# Patient Record
Sex: MALE | Race: WHITE | NOT HISPANIC OR LATINO | Employment: OTHER | URBAN - METROPOLITAN AREA
[De-identification: names, ages, dates, MRNs, and addresses within clinical notes are randomized per-mention and may not be internally consistent; named-entity substitution may affect disease eponyms.]

---

## 2017-03-08 ENCOUNTER — TRANSFERRED RECORDS (OUTPATIENT)
Dept: HEALTH INFORMATION MANAGEMENT | Facility: CLINIC | Age: 67
End: 2017-03-08

## 2017-05-25 ENCOUNTER — ANESTHESIA EVENT (OUTPATIENT)
Dept: SURGERY | Facility: CLINIC | Age: 67
DRG: 470 | End: 2017-05-25
Payer: MEDICARE

## 2017-05-31 ENCOUNTER — ANESTHESIA (OUTPATIENT)
Dept: SURGERY | Facility: CLINIC | Age: 67
DRG: 470 | End: 2017-05-31
Payer: MEDICARE

## 2017-05-31 ENCOUNTER — APPOINTMENT (OUTPATIENT)
Dept: GENERAL RADIOLOGY | Facility: CLINIC | Age: 67
DRG: 470 | End: 2017-05-31
Attending: ORTHOPAEDIC SURGERY
Payer: MEDICARE

## 2017-05-31 ENCOUNTER — HOSPITAL ENCOUNTER (INPATIENT)
Facility: CLINIC | Age: 67
LOS: 5 days | Discharge: HOME HEALTH | DRG: 470 | End: 2017-06-05
Attending: ORTHOPAEDIC SURGERY | Admitting: ORTHOPAEDIC SURGERY
Payer: MEDICARE

## 2017-05-31 DIAGNOSIS — Z96.611 STATUS POST TOTAL SHOULDER ARTHROPLASTY, RIGHT: Chronic | ICD-10-CM

## 2017-05-31 DIAGNOSIS — Z96.652 STATUS POST TOTAL LEFT KNEE REPLACEMENT: Primary | ICD-10-CM

## 2017-05-31 PROBLEM — I25.10 CAD (CORONARY ARTERY DISEASE): Status: ACTIVE | Noted: 2017-05-31

## 2017-05-31 PROBLEM — M17.12 LEFT KNEE DJD: Status: RESOLVED | Noted: 2017-05-31 | Resolved: 2017-05-31

## 2017-05-31 PROBLEM — M17.12 LEFT KNEE DJD: Status: ACTIVE | Noted: 2017-05-31

## 2017-05-31 LAB
BASOPHILS # BLD AUTO: 0 10E9/L (ref 0–0.2)
BASOPHILS NFR BLD AUTO: 0.4 %
CREAT SERPL-MCNC: 1.44 MG/DL (ref 0.66–1.25)
DIFFERENTIAL METHOD BLD: NORMAL
EOSINOPHIL # BLD AUTO: 0.1 10E9/L (ref 0–0.7)
EOSINOPHIL NFR BLD AUTO: 1.9 %
ERYTHROCYTE [DISTWIDTH] IN BLOOD BY AUTOMATED COUNT: 13.2 % (ref 10–15)
GFR SERPL CREATININE-BSD FRML MDRD: 49 ML/MIN/1.7M2
HCT VFR BLD AUTO: 45.8 % (ref 40–53)
HGB BLD-MCNC: 15.3 G/DL (ref 13.3–17.7)
IMM GRANULOCYTES # BLD: 0 10E9/L (ref 0–0.4)
IMM GRANULOCYTES NFR BLD: 0.4 %
INR PPP: 1.04 (ref 0.86–1.14)
LYMPHOCYTES # BLD AUTO: 1.5 10E9/L (ref 0.8–5.3)
LYMPHOCYTES NFR BLD AUTO: 19.8 %
MCH RBC QN AUTO: 30.1 PG (ref 26.5–33)
MCHC RBC AUTO-ENTMCNC: 33.4 G/DL (ref 31.5–36.5)
MCV RBC AUTO: 90 FL (ref 78–100)
MONOCYTES # BLD AUTO: 0.7 10E9/L (ref 0–1.3)
MONOCYTES NFR BLD AUTO: 8.9 %
NEUTROPHILS # BLD AUTO: 5 10E9/L (ref 1.6–8.3)
NEUTROPHILS NFR BLD AUTO: 68.6 %
PLATELET # BLD AUTO: 249 10E9/L (ref 150–450)
RBC # BLD AUTO: 5.08 10E12/L (ref 4.4–5.9)
WBC # BLD AUTO: 7.3 10E9/L (ref 4–11)

## 2017-05-31 PROCEDURE — 25000128 H RX IP 250 OP 636: Performed by: NURSE ANESTHETIST, CERTIFIED REGISTERED

## 2017-05-31 PROCEDURE — 27210794 ZZH OR GENERAL SUPPLY STERILE: Performed by: ORTHOPAEDIC SURGERY

## 2017-05-31 PROCEDURE — 40000274 ZZH STATISTIC RCP CONSULT EA 30 MIN

## 2017-05-31 PROCEDURE — 36415 COLL VENOUS BLD VENIPUNCTURE: CPT | Performed by: ORTHOPAEDIC SURGERY

## 2017-05-31 PROCEDURE — 37000008 ZZH ANESTHESIA TECHNICAL FEE, 1ST 30 MIN: Performed by: ORTHOPAEDIC SURGERY

## 2017-05-31 PROCEDURE — 37000009 ZZH ANESTHESIA TECHNICAL FEE, EACH ADDTL 15 MIN: Performed by: ORTHOPAEDIC SURGERY

## 2017-05-31 PROCEDURE — C1776 JOINT DEVICE (IMPLANTABLE): HCPCS | Performed by: ORTHOPAEDIC SURGERY

## 2017-05-31 PROCEDURE — 25000125 ZZHC RX 250: Performed by: PHYSICIAN ASSISTANT

## 2017-05-31 PROCEDURE — 85025 COMPLETE CBC W/AUTO DIFF WBC: CPT | Performed by: PHYSICIAN ASSISTANT

## 2017-05-31 PROCEDURE — 25000132 ZZH RX MED GY IP 250 OP 250 PS 637: Mod: GY | Performed by: ORTHOPAEDIC SURGERY

## 2017-05-31 PROCEDURE — 82565 ASSAY OF CREATININE: CPT | Performed by: ORTHOPAEDIC SURGERY

## 2017-05-31 PROCEDURE — 25000128 H RX IP 250 OP 636: Performed by: ORTHOPAEDIC SURGERY

## 2017-05-31 PROCEDURE — 71000012 ZZH RECOVERY PHASE 1 LEVEL 1 FIRST HR: Performed by: ORTHOPAEDIC SURGERY

## 2017-05-31 PROCEDURE — A9270 NON-COVERED ITEM OR SERVICE: HCPCS | Mod: GY | Performed by: ORTHOPAEDIC SURGERY

## 2017-05-31 PROCEDURE — 40000306 ZZH STATISTIC PRE PROC ASSESS II: Performed by: ORTHOPAEDIC SURGERY

## 2017-05-31 PROCEDURE — 71000013 ZZH RECOVERY PHASE 1 LEVEL 1 EA ADDTL HR: Performed by: ORTHOPAEDIC SURGERY

## 2017-05-31 PROCEDURE — 25000125 ZZHC RX 250: Performed by: NURSE ANESTHETIST, CERTIFIED REGISTERED

## 2017-05-31 PROCEDURE — 27810169 ZZH OR IMPLANT GENERAL: Performed by: ORTHOPAEDIC SURGERY

## 2017-05-31 PROCEDURE — 40000986 XR KNEE PORT LT 1/2 VW: Mod: LT

## 2017-05-31 PROCEDURE — 27110028 ZZH OR GENERAL SUPPLY NON-STERILE: Performed by: ORTHOPAEDIC SURGERY

## 2017-05-31 PROCEDURE — 36415 COLL VENOUS BLD VENIPUNCTURE: CPT | Performed by: PHYSICIAN ASSISTANT

## 2017-05-31 PROCEDURE — 25000128 H RX IP 250 OP 636: Performed by: PHYSICIAN ASSISTANT

## 2017-05-31 PROCEDURE — 85610 PROTHROMBIN TIME: CPT | Performed by: PHYSICIAN ASSISTANT

## 2017-05-31 PROCEDURE — 36000093 ZZH SURGERY LEVEL 4 1ST 30 MIN: Performed by: ORTHOPAEDIC SURGERY

## 2017-05-31 PROCEDURE — 12000007 ZZH R&B INTERMEDIATE

## 2017-05-31 PROCEDURE — 36000063 ZZH SURGERY LEVEL 4 EA 15 ADDTL MIN: Performed by: ORTHOPAEDIC SURGERY

## 2017-05-31 PROCEDURE — 0SRD0J9 REPLACEMENT OF LEFT KNEE JOINT WITH SYNTHETIC SUBSTITUTE, CEMENTED, OPEN APPROACH: ICD-10-PCS | Performed by: ORTHOPAEDIC SURGERY

## 2017-05-31 DEVICE — IMP BASEPLATE TIBIAL HOWM TRI 7 5520-B-700: Type: IMPLANTABLE DEVICE | Site: KNEE | Status: FUNCTIONAL

## 2017-05-31 DEVICE — IMP COMP PATELLA HOWM TRI ASYM 38X11MM 5551-G-381: Type: IMPLANTABLE DEVICE | Site: KNEE | Status: FUNCTIONAL

## 2017-05-31 DEVICE — BONE CEMENT PALACOS 00-1112-140-01: Type: IMPLANTABLE DEVICE | Site: KNEE | Status: FUNCTIONAL

## 2017-05-31 DEVICE — IMP COMP FEM STRK TRIATHLN PS LT 7 5515-F-701: Type: IMPLANTABLE DEVICE | Site: KNEE | Status: FUNCTIONAL

## 2017-05-31 DEVICE — IMP INSERT TIBIAL HOWM TRI 7 11MM 5532-G-711: Type: IMPLANTABLE DEVICE | Site: KNEE | Status: FUNCTIONAL

## 2017-05-31 RX ORDER — MEPERIDINE HYDROCHLORIDE 25 MG/ML
12.5 INJECTION INTRAMUSCULAR; INTRAVENOUS; SUBCUTANEOUS EVERY 5 MIN PRN
Status: DISCONTINUED | OUTPATIENT
Start: 2017-05-31 | End: 2017-05-31 | Stop reason: HOSPADM

## 2017-05-31 RX ORDER — ALBUTEROL SULFATE 0.83 MG/ML
2.5 SOLUTION RESPIRATORY (INHALATION) EVERY 4 HOURS PRN
Status: DISCONTINUED | OUTPATIENT
Start: 2017-05-31 | End: 2017-05-31 | Stop reason: HOSPADM

## 2017-05-31 RX ORDER — ONDANSETRON 2 MG/ML
4 INJECTION INTRAMUSCULAR; INTRAVENOUS EVERY 30 MIN PRN
Status: DISCONTINUED | OUTPATIENT
Start: 2017-05-31 | End: 2017-05-31 | Stop reason: HOSPADM

## 2017-05-31 RX ORDER — ACETAMINOPHEN 325 MG/1
975 TABLET ORAL EVERY 8 HOURS
Status: COMPLETED | OUTPATIENT
Start: 2017-05-31 | End: 2017-06-03

## 2017-05-31 RX ORDER — LIDOCAINE HYDROCHLORIDE 10 MG/ML
INJECTION, SOLUTION INFILTRATION; PERINEURAL PRN
Status: DISCONTINUED | OUTPATIENT
Start: 2017-05-31 | End: 2017-05-31

## 2017-05-31 RX ORDER — ALBUTEROL SULFATE 0.83 MG/ML
2.5 SOLUTION RESPIRATORY (INHALATION) EVERY 4 HOURS PRN
Status: DISCONTINUED | OUTPATIENT
Start: 2017-05-31 | End: 2017-06-05 | Stop reason: HOSPADM

## 2017-05-31 RX ORDER — DEXAMETHASONE SODIUM PHOSPHATE 4 MG/ML
4 INJECTION, SOLUTION INTRA-ARTICULAR; INTRALESIONAL; INTRAMUSCULAR; INTRAVENOUS; SOFT TISSUE
Status: DISCONTINUED | OUTPATIENT
Start: 2017-05-31 | End: 2017-05-31 | Stop reason: HOSPADM

## 2017-05-31 RX ORDER — LISINOPRIL/HYDROCHLOROTHIAZIDE 10-12.5 MG
2 TABLET ORAL 2 TIMES DAILY
Status: DISCONTINUED | OUTPATIENT
Start: 2017-06-01 | End: 2017-06-01

## 2017-05-31 RX ORDER — GLYCOPYRROLATE 0.2 MG/ML
INJECTION, SOLUTION INTRAMUSCULAR; INTRAVENOUS PRN
Status: DISCONTINUED | OUTPATIENT
Start: 2017-05-31 | End: 2017-05-31

## 2017-05-31 RX ORDER — ALBUTEROL SULFATE 90 UG/1
2 AEROSOL, METERED RESPIRATORY (INHALATION) EVERY 4 HOURS PRN
Status: DISCONTINUED | OUTPATIENT
Start: 2017-05-31 | End: 2017-06-05 | Stop reason: HOSPADM

## 2017-05-31 RX ORDER — PROCHLORPERAZINE MALEATE 5 MG
5 TABLET ORAL EVERY 6 HOURS PRN
Status: DISCONTINUED | OUTPATIENT
Start: 2017-05-31 | End: 2017-06-05 | Stop reason: HOSPADM

## 2017-05-31 RX ORDER — ONDANSETRON 2 MG/ML
INJECTION INTRAMUSCULAR; INTRAVENOUS PRN
Status: DISCONTINUED | OUTPATIENT
Start: 2017-05-31 | End: 2017-05-31

## 2017-05-31 RX ORDER — ALBUTEROL SULFATE 0.83 MG/ML
2.5 SOLUTION RESPIRATORY (INHALATION) EVERY 4 HOURS PRN
COMMUNITY
Start: 2017-05-23

## 2017-05-31 RX ORDER — NALOXONE HYDROCHLORIDE 0.4 MG/ML
.1-.4 INJECTION, SOLUTION INTRAMUSCULAR; INTRAVENOUS; SUBCUTANEOUS
Status: DISCONTINUED | OUTPATIENT
Start: 2017-05-31 | End: 2017-05-31

## 2017-05-31 RX ORDER — HYDROMORPHONE HYDROCHLORIDE 1 MG/ML
.3-.5 INJECTION, SOLUTION INTRAMUSCULAR; INTRAVENOUS; SUBCUTANEOUS EVERY 5 MIN PRN
Status: DISCONTINUED | OUTPATIENT
Start: 2017-05-31 | End: 2017-05-31 | Stop reason: HOSPADM

## 2017-05-31 RX ORDER — LIDOCAINE 40 MG/G
CREAM TOPICAL
Status: DISCONTINUED | OUTPATIENT
Start: 2017-05-31 | End: 2017-06-05 | Stop reason: HOSPADM

## 2017-05-31 RX ORDER — HYDROXYZINE HYDROCHLORIDE 10 MG/1
10 TABLET, FILM COATED ORAL EVERY 6 HOURS PRN
Status: DISCONTINUED | OUTPATIENT
Start: 2017-05-31 | End: 2017-06-05 | Stop reason: HOSPADM

## 2017-05-31 RX ORDER — ALBUTEROL SULFATE 90 UG/1
2 AEROSOL, METERED RESPIRATORY (INHALATION) EVERY 4 HOURS PRN
Status: CANCELLED | OUTPATIENT
Start: 2017-05-31

## 2017-05-31 RX ORDER — BUPIVACAINE HYDROCHLORIDE 7.5 MG/ML
INJECTION, SOLUTION INTRASPINAL PRN
Status: DISCONTINUED | OUTPATIENT
Start: 2017-05-31 | End: 2017-05-31

## 2017-05-31 RX ORDER — FENTANYL CITRATE 50 UG/ML
INJECTION, SOLUTION INTRAMUSCULAR; INTRAVENOUS PRN
Status: DISCONTINUED | OUTPATIENT
Start: 2017-05-31 | End: 2017-05-31

## 2017-05-31 RX ORDER — ONDANSETRON 2 MG/ML
4 INJECTION INTRAMUSCULAR; INTRAVENOUS EVERY 6 HOURS PRN
Status: DISCONTINUED | OUTPATIENT
Start: 2017-05-31 | End: 2017-06-05 | Stop reason: HOSPADM

## 2017-05-31 RX ORDER — CEFAZOLIN SODIUM 1 G/3ML
1 INJECTION, POWDER, FOR SOLUTION INTRAMUSCULAR; INTRAVENOUS SEE ADMIN INSTRUCTIONS
Status: DISCONTINUED | OUTPATIENT
Start: 2017-05-31 | End: 2017-05-31 | Stop reason: HOSPADM

## 2017-05-31 RX ORDER — SPIRONOLACTONE 25 MG/1
25 TABLET ORAL DAILY
Status: DISCONTINUED | OUTPATIENT
Start: 2017-06-01 | End: 2017-06-01

## 2017-05-31 RX ORDER — PROPOFOL 10 MG/ML
INJECTION, EMULSION INTRAVENOUS CONTINUOUS PRN
Status: DISCONTINUED | OUTPATIENT
Start: 2017-05-31 | End: 2017-05-31

## 2017-05-31 RX ORDER — OXYCODONE HYDROCHLORIDE 5 MG/1
5-10 TABLET ORAL EVERY 4 HOURS PRN
Status: DISCONTINUED | OUTPATIENT
Start: 2017-05-31 | End: 2017-06-05

## 2017-05-31 RX ORDER — ACETAMINOPHEN 325 MG/1
650 TABLET ORAL EVERY 4 HOURS PRN
Status: DISCONTINUED | OUTPATIENT
Start: 2017-06-03 | End: 2017-06-05 | Stop reason: HOSPADM

## 2017-05-31 RX ORDER — NALOXONE HYDROCHLORIDE 0.4 MG/ML
.1-.4 INJECTION, SOLUTION INTRAMUSCULAR; INTRAVENOUS; SUBCUTANEOUS
Status: DISCONTINUED | OUTPATIENT
Start: 2017-05-31 | End: 2017-06-05 | Stop reason: HOSPADM

## 2017-05-31 RX ORDER — HYDROMORPHONE HYDROCHLORIDE 1 MG/ML
.3-.5 INJECTION, SOLUTION INTRAMUSCULAR; INTRAVENOUS; SUBCUTANEOUS
Status: DISCONTINUED | OUTPATIENT
Start: 2017-05-31 | End: 2017-06-05 | Stop reason: HOSPADM

## 2017-05-31 RX ORDER — SODIUM CHLORIDE 9 MG/ML
INJECTION, SOLUTION INTRAVENOUS CONTINUOUS
Status: DISCONTINUED | OUTPATIENT
Start: 2017-05-31 | End: 2017-06-01

## 2017-05-31 RX ORDER — AMOXICILLIN 250 MG
1-2 CAPSULE ORAL 2 TIMES DAILY
Status: DISCONTINUED | OUTPATIENT
Start: 2017-05-31 | End: 2017-06-05 | Stop reason: HOSPADM

## 2017-05-31 RX ORDER — DEXAMETHASONE SODIUM PHOSPHATE 4 MG/ML
INJECTION, SOLUTION INTRA-ARTICULAR; INTRALESIONAL; INTRAMUSCULAR; INTRAVENOUS; SOFT TISSUE PRN
Status: DISCONTINUED | OUTPATIENT
Start: 2017-05-31 | End: 2017-05-31

## 2017-05-31 RX ORDER — CEFAZOLIN SODIUM 2 G/100ML
2 INJECTION, SOLUTION INTRAVENOUS EVERY 8 HOURS
Status: COMPLETED | OUTPATIENT
Start: 2017-05-31 | End: 2017-06-01

## 2017-05-31 RX ORDER — FENTANYL CITRATE 50 UG/ML
25-50 INJECTION, SOLUTION INTRAMUSCULAR; INTRAVENOUS
Status: DISCONTINUED | OUTPATIENT
Start: 2017-05-31 | End: 2017-05-31 | Stop reason: HOSPADM

## 2017-05-31 RX ORDER — ONDANSETRON 4 MG/1
4 TABLET, ORALLY DISINTEGRATING ORAL EVERY 6 HOURS PRN
Status: DISCONTINUED | OUTPATIENT
Start: 2017-05-31 | End: 2017-06-05 | Stop reason: HOSPADM

## 2017-05-31 RX ORDER — SODIUM CHLORIDE, SODIUM LACTATE, POTASSIUM CHLORIDE, CALCIUM CHLORIDE 600; 310; 30; 20 MG/100ML; MG/100ML; MG/100ML; MG/100ML
INJECTION, SOLUTION INTRAVENOUS CONTINUOUS
Status: DISCONTINUED | OUTPATIENT
Start: 2017-05-31 | End: 2017-05-31 | Stop reason: HOSPADM

## 2017-05-31 RX ORDER — ONDANSETRON 4 MG/1
4 TABLET, ORALLY DISINTEGRATING ORAL EVERY 30 MIN PRN
Status: DISCONTINUED | OUTPATIENT
Start: 2017-05-31 | End: 2017-05-31 | Stop reason: HOSPADM

## 2017-05-31 RX ORDER — LIDOCAINE 40 MG/G
CREAM TOPICAL
Status: DISCONTINUED | OUTPATIENT
Start: 2017-05-31 | End: 2017-05-31 | Stop reason: HOSPADM

## 2017-05-31 RX ORDER — CEFAZOLIN SODIUM 1 G/50ML
3 SOLUTION INTRAVENOUS
Status: COMPLETED | OUTPATIENT
Start: 2017-05-31 | End: 2017-05-31

## 2017-05-31 RX ADMIN — PROPOFOL 50 MCG/KG/MIN: 10 INJECTION, EMULSION INTRAVENOUS at 12:31

## 2017-05-31 RX ADMIN — GLYCOPYRROLATE 0.1 MG: 0.2 INJECTION, SOLUTION INTRAMUSCULAR; INTRAVENOUS at 12:20

## 2017-05-31 RX ADMIN — PHENYLEPHRINE HYDROCHLORIDE 100 MCG: 10 INJECTION, SOLUTION INTRAMUSCULAR; INTRAVENOUS; SUBCUTANEOUS at 13:18

## 2017-05-31 RX ADMIN — Medication 3 G: at 12:20

## 2017-05-31 RX ADMIN — PHENYLEPHRINE HYDROCHLORIDE 100 MCG: 10 INJECTION, SOLUTION INTRAMUSCULAR; INTRAVENOUS; SUBCUTANEOUS at 13:43

## 2017-05-31 RX ADMIN — PHENYLEPHRINE HYDROCHLORIDE 100 MCG: 10 INJECTION, SOLUTION INTRAMUSCULAR; INTRAVENOUS; SUBCUTANEOUS at 13:14

## 2017-05-31 RX ADMIN — SODIUM CHLORIDE: 9 INJECTION, SOLUTION INTRAVENOUS at 16:58

## 2017-05-31 RX ADMIN — SODIUM CHLORIDE, POTASSIUM CHLORIDE, SODIUM LACTATE AND CALCIUM CHLORIDE: 600; 310; 30; 20 INJECTION, SOLUTION INTRAVENOUS at 10:55

## 2017-05-31 RX ADMIN — MIDAZOLAM HYDROCHLORIDE 1 MG: 1 INJECTION, SOLUTION INTRAMUSCULAR; INTRAVENOUS at 12:31

## 2017-05-31 RX ADMIN — PHENYLEPHRINE HYDROCHLORIDE 100 MCG: 10 INJECTION, SOLUTION INTRAMUSCULAR; INTRAVENOUS; SUBCUTANEOUS at 12:52

## 2017-05-31 RX ADMIN — MIDAZOLAM HYDROCHLORIDE 2 MG: 1 INJECTION, SOLUTION INTRAMUSCULAR; INTRAVENOUS at 12:20

## 2017-05-31 RX ADMIN — OXYCODONE HYDROCHLORIDE 10 MG: 5 TABLET ORAL at 20:45

## 2017-05-31 RX ADMIN — MIDAZOLAM HYDROCHLORIDE 1 MG: 1 INJECTION, SOLUTION INTRAMUSCULAR; INTRAVENOUS at 13:23

## 2017-05-31 RX ADMIN — FENTANYL CITRATE 50 MCG: 50 INJECTION, SOLUTION INTRAMUSCULAR; INTRAVENOUS at 12:22

## 2017-05-31 RX ADMIN — CEFAZOLIN SODIUM 2 G: 2 INJECTION, SOLUTION INTRAVENOUS at 20:04

## 2017-05-31 RX ADMIN — FENTANYL CITRATE 50 MCG: 50 INJECTION, SOLUTION INTRAMUSCULAR; INTRAVENOUS at 12:20

## 2017-05-31 RX ADMIN — TRANEXAMIC ACID 1 G: 100 INJECTION, SOLUTION INTRAVENOUS at 12:36

## 2017-05-31 RX ADMIN — PHENYLEPHRINE HYDROCHLORIDE 100 MCG: 10 INJECTION, SOLUTION INTRAMUSCULAR; INTRAVENOUS; SUBCUTANEOUS at 13:34

## 2017-05-31 RX ADMIN — SENNOSIDES AND DOCUSATE SODIUM 2 TABLET: 8.6; 5 TABLET ORAL at 20:05

## 2017-05-31 RX ADMIN — SODIUM CHLORIDE, POTASSIUM CHLORIDE, SODIUM LACTATE AND CALCIUM CHLORIDE: 600; 310; 30; 20 INJECTION, SOLUTION INTRAVENOUS at 13:33

## 2017-05-31 RX ADMIN — PHENYLEPHRINE HYDROCHLORIDE 100 MCG: 10 INJECTION, SOLUTION INTRAMUSCULAR; INTRAVENOUS; SUBCUTANEOUS at 12:45

## 2017-05-31 RX ADMIN — PHENYLEPHRINE HYDROCHLORIDE 100 MCG: 10 INJECTION, SOLUTION INTRAMUSCULAR; INTRAVENOUS; SUBCUTANEOUS at 13:21

## 2017-05-31 RX ADMIN — DEXAMETHASONE SODIUM PHOSPHATE 8 MG: 4 INJECTION, SOLUTION INTRA-ARTICULAR; INTRALESIONAL; INTRAMUSCULAR; INTRAVENOUS; SOFT TISSUE at 12:34

## 2017-05-31 RX ADMIN — LIDOCAINE HYDROCHLORIDE 1 ML: 10 INJECTION, SOLUTION INFILTRATION; PERINEURAL at 12:27

## 2017-05-31 RX ADMIN — PHENYLEPHRINE HYDROCHLORIDE 100 MCG: 10 INJECTION, SOLUTION INTRAMUSCULAR; INTRAVENOUS; SUBCUTANEOUS at 13:05

## 2017-05-31 RX ADMIN — RANITIDINE HYDROCHLORIDE 150 MG: 150 TABLET, FILM COATED ORAL at 20:05

## 2017-05-31 RX ADMIN — HYDROXYZINE HYDROCHLORIDE 10 MG: 10 TABLET ORAL at 20:45

## 2017-05-31 RX ADMIN — OXYCODONE HYDROCHLORIDE 10 MG: 5 TABLET ORAL at 16:53

## 2017-05-31 RX ADMIN — BUPIVACAINE HYDROCHLORIDE IN DEXTROSE 1.6 ML: 7.5 INJECTION, SOLUTION SUBARACHNOID at 12:28

## 2017-05-31 RX ADMIN — EPINEPHRINE 0.2 MG: 1 INJECTION, SOLUTION INTRAMUSCULAR; SUBCUTANEOUS at 12:28

## 2017-05-31 RX ADMIN — MIDAZOLAM HYDROCHLORIDE 2 MG: 1 INJECTION, SOLUTION INTRAMUSCULAR; INTRAVENOUS at 12:22

## 2017-05-31 RX ADMIN — LIDOCAINE HYDROCHLORIDE 1 ML: 10 INJECTION, SOLUTION INFILTRATION; PERINEURAL at 12:25

## 2017-05-31 RX ADMIN — MIDAZOLAM HYDROCHLORIDE 1 MG: 1 INJECTION, SOLUTION INTRAMUSCULAR; INTRAVENOUS at 12:42

## 2017-05-31 RX ADMIN — LIDOCAINE HYDROCHLORIDE 10 ML: 10 INJECTION, SOLUTION INFILTRATION; PERINEURAL at 12:31

## 2017-05-31 RX ADMIN — ONDANSETRON 4 MG: 2 INJECTION INTRAMUSCULAR; INTRAVENOUS at 12:35

## 2017-05-31 RX ADMIN — PHENYLEPHRINE HYDROCHLORIDE 100 MCG: 10 INJECTION, SOLUTION INTRAMUSCULAR; INTRAVENOUS; SUBCUTANEOUS at 13:46

## 2017-05-31 RX ADMIN — ACETAMINOPHEN 975 MG: 325 TABLET, FILM COATED ORAL at 16:54

## 2017-05-31 RX ADMIN — GLYCOPYRROLATE 0.1 MG: 0.2 INJECTION, SOLUTION INTRAMUSCULAR; INTRAVENOUS at 12:22

## 2017-05-31 ASSESSMENT — LIFESTYLE VARIABLES: TOBACCO_USE: 1

## 2017-05-31 NOTE — IP AVS SNAPSHOT
[facility]    [address] [facility] [address]    Phone:  [phone]    Fax:  [phone]                                       After Visit Summary   5/31/2017    Jorge Alberto Frye    MRN: [id number]           After Visit Summary Signature Page     I have received my discharge instructions, and my questions have been answered. I have discussed any challenges I see with this plan with the nurse or doctor.    ..........................................................................................................................................  Patient/Patient Representative Signature      ..........................................................................................................................................  Patient Representative Print Name and Relationship to Patient    ..................................................               ................................................  Date                                            Time    ..........................................................................................................................................  Reviewed by Signature/Title    ...................................................              ..............................................  Date                                                            Time

## 2017-05-31 NOTE — PROGRESS NOTES
"[facility] [facility] ADMISSION NOTE    Patient admitted to room 2213 at approximately 1600 via cart from surgery. Patient was accompanied by transport tech and nurse.     Verbal SBAR report received from Pat RN prior to patient arrival.     Patient trasferred to bed via air marcelo. Patient alert and oriented X 3. The patient is not having any pain.  . Admission vital signs: Blood pressure 110/62, pulse 72, temperature 97.2  F (36.2  C), temperature source Oral, resp. rate 14, height 1.93 m (6' 4\"), weight 122.5 kg (270 lb), SpO2 94 %. Patient was oriented to plan of care, call light, bed controls, tv, telephone, bathroom and visiting hours.     The following safety risks were identified during admission: fall. Yellow risk band applied: YES.     Francisca Murrell    "

## 2017-05-31 NOTE — ANESTHESIA PROCEDURE NOTES
Peripheral nerve/Neuraxial procedure note : intrathecal  Pre-Procedure  Performed by  ELISABETH GOMEZ   Location: OR    Procedure Times:5/31/2017 12:23 PM and 5/31/2017 12:29 PM  Pre-Anesthestic Checklist: patient identified, IV checked, site marked, risks and benefits discussed, informed consent, monitors and equipment checked, pre-op evaluation and at physician/surgeon's request    Timeout  Correct Patient: Yes   Correct Procedure: Yes   Correct Site: Yes   Correct Laterality: N/A   Correct Position: Yes   Site Marked: N/A   .   Procedure Documentation  ASA 3  Diagnosis:L knee DJD.    Procedure:    Intrathecal.  Insertion Site:L2-3  (midline approach)      Patient Prep;mask, sterile gloves, povidone-iodine 7.5% surgical scrub, patient draped.  .  Needle: (). # of attempts: 2. # of redirects: 1.  Spinal Needle: Herminio tip 25 G. 3.5 in.  Introducer used. . .     Assessment/Narrative  Paresthesias: No.  .  .  clear CSF fluid removed while sitting   . Time Injected: 12:28

## 2017-05-31 NOTE — ANESTHESIA CARE TRANSFER NOTE
Patient: Jorge Alberto Frye    Procedure(s):  Left Total Knee Arthroplasty - Wound Class: I-Clean    Diagnosis: degenerative joint disease  Diagnosis Additional Information: No value filed.    Anesthesia Type:   Spinal, Periph. Nerve Block for postop pain     Note:  Airway :Nasal Cannula  Patient transferred to:PACU        Vitals: (Last set prior to Anesthesia Care Transfer)    CRNA VITALS  5/31/2017 1345 - 5/31/2017 1415      5/31/2017             Pulse: 75    SpO2: 97 %                Electronically Signed By: MAME Calix CRNA  May 31, 2017  2:15 PM

## 2017-05-31 NOTE — IP AVS SNAPSHOT
MRN:[id number]                      After Visit Summary   5/31/2017    Jorge Alberto Frye    MRN: [id number]           Thank you!     Thank you for choosing [facility] for your care. Our goal is always to provide you with excellent care. Hearing back from our patients is one way we can continue to improve our services. Please take a few minutes to complete the written survey that you may receive in the mail after you visit with us. Thank you!        Patient Information     Date Of Birth          1950        Designated Caregiver       Most Recent Value    Caregiver    Will someone help with your care after discharge? yes    Name of designated caregiver genaro    Phone number of caregiver [phone]    Caregiver address same as patient      About your hospital stay     You were admitted on:  May 31, 2017 You last received care in the:  [facility]    You were discharged on:  June 5, 2017        Reason for your hospital stay       Left total knee arthroplasty                  Who to Call     For medical emergencies, please call 911.  For non-urgent questions about your medical care, please call your primary care provider or clinic, [phone]  For questions related to your surgery, please call your surgery clinic        Attending Provider     Provider Jorge Alberto White MD Orthopedics       Primary Care Provider Office Phone # Fax #    Rich Lloyd [phone]       When to contact your care team       Call your Orthopedic surgeon at [facility]  if you have any of the following: temperature greater than 100.4,  increased shortness of breath, increased drainage, increased swelling or increased pain.                  After Care Instructions     Activity       Your activity upon discharge: activity as tolerated, no driving until off narcotic pain medication            Diet       Follow this diet upon discharge: Orders Placed This  Encounter      Regular Diet Adult              Supplies       List the supplies the pt needs to go home            Wound care and dressings       Instructions to care for your wound at home: as directed, daily dressing changes, ice to area for comfort, keep wound clean and dry and may get incision wet in shower but do not soak or scrub.                  Follow-up Appointments     Follow-up and recommended labs and tests       Follow up with  Jay Saleem PA-C , at (location with clinic name or city) [facility], within 2 weeks to evaluate after surgery and for hospital follow- up.                  Additional Services     Home Care Referral       _________________________________________________________________    Your provider has referred you to: [facility]: [facility] [phone]   [email or web address]    Extended Emergency Contact Information  Primary Emergency Contact: LAVONNE MARCANO  Address: [address] [address]  Home Phone: [phone]  Work Phone: [phone]  Mobile Phone: [phone]  Relation: Spouse    Patient Anticipated Discharge Date: 6/3/17   RN, PT, HHA to begin 24 - 48 hours after discharge.  PLEASE EVALUATE AND TREAT (Evaluation timeline is 24 - 48 hrs. Please call if there is need for a variance to this timeline).    REASON FOR REFERRAL: Assessment & Treatment: PT and RN    ADDITIONAL SERVICES NEEDED: None    OTHER PERTINENT INFORMATION: Patient was last seen by provider on 6/2/17 for TKA.    Current Outpatient Prescriptions:  oxyCODONE (ROXICODONE) 5 MG IR tablet, Take 1-2 tablets (5-10 mg) by mouth every 4 hours as needed for moderate to severe pain, Disp: 50 tablet, Rfl: 0  hydrOXYzine (ATARAX) 10 MG tablet, Take 1 tablet (10 mg) by mouth every 6 hours as needed for itching or other (adjunct pain), Disp: 50 tablet, Rfl: 0  senna-docusate (SENOKOT-S;PERICOLACE)  8.6-50 MG per tablet, Take 1-2 tablets by mouth 2 times daily, Disp: 100 tablet, Rfl: 1  order for DME, Equipment being ordered: Quad Cane (), Raised Toilet Seats (), Bath Seat () and Other: long reach grasper and hand held shower attachment  Treatment Diagnosis: s/p L TKA, Disp: 1 Units, Rfl: 0  aspirin  MG EC tablet, Take 1 tablet (325 mg) by mouth daily, Disp: 30 tablet, Rfl: 1      Patient Active Problem List:     Status post total shoulder arthroplasty, right     Elevated prostate specific antigen (PSA)     Acute pancreatitis     Cyst of epididymis     Gastroesophageal reflux disease     Benign essential hypertension     Hyperlipidemia     Impotence of organic origin     Low back pain     Primary osteoarthritis of both knees     Encounter for screening for malignant neoplasm of colon     Stricture of esophagus     Asthma     Status post total left knee replacement     CAD (coronary artery disease)     RITA (acute kidney injury) (H)      Documentation of Face to Face and Certification for Home Health Services    I certify that patient, Jorge Alberto Frye is under my care and that I, or a Nurse Practitioner or Physician's Assistant working with me, had a face-to-face encounter that meets the physician face-to-face encounter requirements with this patient on: 6/2/2017.    This encounter with the patient was in whole, or in part, for the following medical condition, which is the primary reason for Home Health Care: TKA.    I certify that, based on my findings, the following services are medically necessary Home Health Services: Nursing and Physical Therapy    My clinical findings support the need for the above services because: Physical Therapy Services are needed to assess and treat the following functional impairments: TKA.    Further, I certify that my clinical findings support that this patient is homebound (i.e. absences from home require considerable and taxing effort and are for medical  reasons or [employer] services or infrequently or of short duration when for other reasons) because: Requires assistance of another person or specialized equipment to access medical services because patient: Requires supervision of another for safe transfer..    Based on the above findings, I certify that this patient is confined to the home and needs intermittent skilled nursing care, physical therapy and/or speech therapy.  The patient is under my care, and I have initiated the establishment of the plan of care.  This patient will be followed by a physician who will periodically review the plan of care.    Physician/Provider to provide follow up care: Rich Lloyd    Responsible [place] certified Physician at time of discharge: Dr. Jurado    Please be aware that coverage of these services is subject to the terms and limitations of your health insurance plan.  Call member services at your health plan with any benefit or coverage questions.            Physical Therapy Referral       *This therapy referral will be filtered to a centralized scheduling office at [facility] and the patient will receive a call to schedule an appointment at a [place] location most convenient for them. *     [facility] provides Physical Therapy evaluation and treatment and many specialty services across the [place] system.  If requesting a specialty program, please choose from the list below.    If you have not heard from the scheduling office within 2 business days, please call [phone] for all locations, with the exception of [place], please call [phone].  Treatment: Evaluation & Treatment  Special Instructions/Modalities: none  Special Programs: None    Please be aware that coverage of these services is subject to the terms and limitations of your health insurance plan.  Call member services at your health plan with any benefit or coverage questions.      **Note to Provider:  If  "you are referring outside of [place] for the therapy appointment, please list the name of the location in the \"special instructions\" above, print the referral and give to the patient to schedule the appointment.                  Further instructions from your care team       1.  Follow up with Jay Saleem PA-C.  in 2 weeks for post op check and x rays as scheduled.  Call [phone] if appointment needed or questions  2.  Use pain medication as directed  3.  Keep incision clean, covered and dry until post op appointment.  You may shower and get incision wet if no drainage is present. You may change your dressing as needed.  No additional adhesives to be put on knee (including bandages).  Only use dry  guaze over Prineo glue tape and then apply 4 inch ACE to hold dressings in place.   4.  Continue physical therapy as soon as possible.  You will need to call a therapy department of your choice to arrange future appointments.  Your order for physical therapy is included in your discharge paperwork.   5.  Take Aspirin 325 mg  daily  for 42 days for anticoagulation    Needs follow-up with PCP and cardiology to assess for prior MI and proper medication management.          Pending Results     Date and Time Order Name Status Description    6/3/2017 2301 Blood culture Preliminary     6/3/2017 2301 Blood culture Preliminary             Statement of Approval     Ordered          06/05/17 0953  I have reviewed and agree with all the recommendations and orders detailed in this document.  EFFECTIVE NOW     Approved and electronically signed by:  Shawnee Cloud PA-C             Admission Information     Date & Time Provider Department Dept. Phone    5/31/2017 Jorge Alberto Jurado MD [facility] Surgical [phone]      Your Vitals Were     Blood Pressure Pulse Temperature Respirations Height Weight    137/70 (BP Location: Left arm) 87 98.7  F (37.1  C) (Oral) 18 1.93 m (6' 4\") 122.5 kg (270 lb)    Pulse Oximetry BMI " "(Body Mass Index)                97% 32.87 kg/m2          [employer] Information     [employer] lets you send messages to your doctor, view your test results, renew your prescriptions, schedule appointments and more. To sign up, go to [email or web address] . Click on \"Log in\" on the left side of the screen, which will take you to the Welcome page. Then click on \"Sign up Now\" on the right side of the page.     You will be asked to enter the access code listed below, as well as some personal information. Please follow the directions to create your username and password.     Your access code is: [email or web address]  Expires: 9/3/2017  2:59 PM     Your access code will  in 90 days. If you need help or a new code, please call your [facility] clinic or [phone].        Care EveryWhere ID     This is your Care EveryWhere ID. This could be used by other organizations to access your [facility] medical records  [id number]           Review of your medicines      START taking        Dose / Directions    HYDROmorphone 2 MG tablet   Commonly known as:  DILAUDID        Dose:  2-4 mg   Take 1-2 tablets (2-4 mg) by mouth every 3 hours as needed for moderate to severe pain   Quantity:  60 tablet   Refills:  0       hydrOXYzine 10 MG tablet   Commonly known as:  ATARAX        Dose:  10 mg   Take 1 tablet (10 mg) by mouth every 6 hours as needed for itching or other (adjunct pain)   Quantity:  50 tablet   Refills:  0       order for DME   Used for:  Status post total shoulder arthroplasty, right        Equipment being ordered: Quad Cane (), Raised Toilet Seats (), Bath Seat () and Other: long reach grasper and hand held shower attachment Treatment Diagnosis: s/p L TKA   Quantity:  1 Units   Refills:  0       senna-docusate 8.6-50 MG per tablet   Commonly known as:  SENOKOT-S;PERICOLACE   Notes to Patient:  Hold for loose stools        Dose:  1-2 tablet   Take 1-2 tablets by mouth 2 times daily   Quantity:  100 tablet "   Refills:  1         CONTINUE these medicines which may have CHANGED, or have new prescriptions. If we are uncertain of the size of tablets/capsules you have at home, strength may be listed as something that might have changed.        Dose / Directions    aspirin  MG EC tablet   This may have changed:    - medication strength  - how much to take        Dose:  325 mg   Take 1 tablet (325 mg) by mouth daily   Quantity:  30 tablet   Refills:  1         CONTINUE these medicines which have NOT CHANGED        Dose / Directions    * albuterol 108 (90 BASE) MCG/ACT Inhaler   Commonly known as:  PROAIR HFA/PROVENTIL HFA/VENTOLIN HFA        Dose:  2 puff   Inhale 2 puffs into the lungs every 4 hours as needed   Refills:  0       * albuterol (2.5 MG/3ML) 0.083% neb solution        Dose:  2.5 mg   Inhale 2.5 mg into the lungs every 4 hours as needed   Refills:  0       fluticasone-salmeterol 250-50 MCG/DOSE diskus inhaler   Commonly known as:  ADVAIR        Dose:  1 puff   Inhale 1 puff into the lungs 2 times daily   Refills:  0       garlic 150 MG Tabs tablet        Dose:  150 mg   Take 150 mg by mouth daily   Refills:  0       lisinopril-hydrochlorothiazide 20-25 MG per tablet   Commonly known as:  PRINZIDE/ZESTORETIC        Dose:  1 tablet   Take 1 tablet by mouth 2 times daily   Refills:  0       MULTIPLE VITAMINS PO        Dose:  1 tablet   Take 1 tablet by mouth daily   Refills:  0       OMEGA-3 FISH OIL PO        Dose:  2 g   Take 2 g by mouth daily   Refills:  0       PRILOSEC OTC PO        Dose:  20 mg   Take 20 mg by mouth daily   Refills:  0       SILDENAFIL CITRATE PO        Dose:  20-40 mg   Take 20-40 mg by mouth daily as needed   Refills:  0       SPIRONOLACTONE PO        Dose:  25 mg   Take 25 mg by mouth daily   Refills:  0       VITAMIN D (CHOLECALCIFEROL) PO        Dose:  5000 Units   Take 5,000 Units by mouth daily   Refills:  0       * Notice:  This list has 2 medication(s) that are the same as other  medications prescribed for you. Read the directions carefully, and ask your doctor or other care provider to review them with you.         Where to get your medicines      These medications were sent to [facility] [address] [place] [address] [address]  [address], [facility] [address]     Phone:  [phone]     aspirin  MG EC tablet    hydrOXYzine 10 MG tablet    senna-docusate 8.6-50 MG per tablet         Some of these will need a paper prescription and others can be bought over the counter. Ask your nurse if you have questions.     Bring a paper prescription for each of these medications     HYDROmorphone 2 MG tablet    order for DME                Protect others around you: Learn how to safely use, store and throw away your medicines at www.disposemymeds.org.             Medication List: This is a list of all your medications and when to take them. Check marks below indicate your daily home schedule. Keep this list as a reference.      Medications           Morning Afternoon Evening Bedtime As Needed    * albuterol 108 (90 BASE) MCG/ACT Inhaler   Commonly known as:  PROAIR HFA/PROVENTIL HFA/VENTOLIN HFA   Inhale 2 puffs into the lungs every 4 hours as needed                                   * albuterol (2.5 MG/3ML) 0.083% neb solution   Inhale 2.5 mg into the lungs every 4 hours as needed                                   aspirin  MG EC tablet   Take 1 tablet (325 mg) by mouth daily            Take today                       fluticasone-salmeterol 250-50 MCG/DOSE diskus inhaler   Commonly known as:  ADVAIR   Inhale 1 puff into the lungs 2 times daily   Last time this was given:  1 puff on 6/5/2017  8:02 AM            Had this morning           Take tonight               garlic 150 MG Tabs tablet   Take 150 mg by mouth daily            Resume                       HYDROmorphone 2 MG tablet   Commonly known as:  DILAUDID   Take 1-2 tablets (2-4 mg) by mouth every 3 hours  as needed for moderate to severe pain   Last time this was given:  4 mg on 6/5/2017  4:15 PM                                   hydrOXYzine 10 MG tablet   Commonly known as:  ATARAX   Take 1 tablet (10 mg) by mouth every 6 hours as needed for itching or other (adjunct pain)   Last time this was given:  10 mg on 6/4/2017 11:38 AM                                lisinopril-hydrochlorothiazide 20-25 MG per tablet   Commonly known as:  PRINZIDE/ZESTORETIC   Take 1 tablet by mouth 2 times daily            Had this morning           Take tonight               MULTIPLE VITAMINS PO   Take 1 tablet by mouth daily            Resume                       OMEGA-3 FISH OIL PO   Take 2 g by mouth daily            Resume                       order for DME   Equipment being ordered: Quad Cane (), Raised Toilet Seats (), Bath Seat () and Other: long reach grasper and hand held shower attachment Treatment Diagnosis: s/p L TKA                                PRILOSEC OTC PO   Take 20 mg by mouth daily            Had this morning                       senna-docusate 8.6-50 MG per tablet   Commonly known as:  SENOKOT-S;PERICOLACE   Take 1-2 tablets by mouth 2 times daily   Last time this was given:  2 tablets on 6/5/2017  7:58 AM   Notes to Patient:  Hold for loose stools            Had this morning           Take tonight               SILDENAFIL CITRATE PO   Take 20-40 mg by mouth daily as needed                                   SPIRONOLACTONE PO   Take 25 mg by mouth daily   Last time this was given:  25 mg on 6/5/2017  7:58 AM            Had this morning                       VITAMIN D (CHOLECALCIFEROL) PO   Take 5,000 Units by mouth daily            Resume                       * Notice:  This list has 2 medication(s) that are the same as other medications prescribed for you. Read the directions carefully, and ask your doctor or other care provider to review them with you.

## 2017-05-31 NOTE — ANESTHESIA POSTPROCEDURE EVALUATION
Patient: Jorge Alberto Frye    Procedure(s):  Left Total Knee Arthroplasty - Wound Class: I-Clean    Diagnosis:degenerative joint disease  Diagnosis Additional Information: No value filed.    Anesthesia Type:  Spinal, Periph. Nerve Block for postop pain    Note:  Anesthesia Post Evaluation    Patient location during evaluation: PACU  Patient participation: Able to fully participate in evaluation  Level of consciousness: awake and alert  Pain management: adequate  Airway patency: patent  Cardiovascular status: acceptable and hemodynamically stable  Respiratory status: acceptable, spontaneous ventilation and nasal cannula  Hydration status: acceptable  PONV: none     Anesthetic complications: None          Last vitals:  Vitals:    05/31/17 1445 05/31/17 1500 05/31/17 1515   BP: 112/49 124/69 127/62   Pulse:      Resp: 13 8 16   Temp:      SpO2: 94% 95% 93%         Electronically Signed By: MAME Calix CRNA  May 31, 2017  3:34 PM

## 2017-05-31 NOTE — IP AVS SNAPSHOT
"    [facility] SURGICAL: [phone]                                              INTERAGENCY TRANSFER FORM - PHYSICIAN ORDERS   2017                    Hospital Admission Date: 2017  AZEB MARCANO   : 1950  Sex: Male        Attending Provider: (none)    Allergies:  Amlodipine    Infection:  None   Service:  SURGERY    Ht:  6' 4\" (1.93 m)   Wt:  270 lb (122.5 kg)   Admission Wt:  270 lb (122.5 kg)    BMI:  32.87 kg/m 2   BSA:  2.56 m 2            Patient PCP Information     Provider PCP Type    Rich Lloyd MD General      ED Clinical Impression     Diagnosis Description Comment Added By Time Added    Status post total left knee replacement [Z96.652] Status post total left knee replacement [Z96.652]  Shawnee Cloud PA-C 2017 11:40 AM    Status post total shoulder arthroplasty, right [Z96.611] Status post total shoulder arthroplasty, right [Z96.611]  Shawnee Cloud PA-C 2017 11:43 AM      Hospital Problems as of 2017              Priority Class Noted POA    Gastroesophageal reflux disease   3/10/2006 Yes    Benign essential hypertension   3/10/2006 Yes    Asthma   3/10/2006 Yes    Hyperlipidemia   2006 Yes    CAD (coronary artery disease)   2017 Yes    * (Principal)Status post total left knee replacement   2017 Yes    RITA (acute kidney injury) (H)   2017 Yes      Non-Hospital Problems as of 2017              Priority Class Noted    Impotence of organic origin   3/10/2006    Encounter for screening for malignant neoplasm of colon   2006    Cyst of epididymis   2008    Low back pain   2008    Stricture of esophagus   2008    Elevated prostate specific antigen (PSA)   2014    Primary osteoarthritis of both knees   2015    Status post total shoulder arthroplasty, right   2016      Code Status History     Date Active Date Inactive Code Status Order ID Comments User Context    2017  4:12 PM " 6/5/2017  7:50 PM Full Code [id number]  Jorge Alberto Jurado MD Inpatient    11/19/2016 10:01 AM 5/31/2017  4:12 PM Full Code [id number]  Cayden Acevedo MD Outpatient    11/17/2016  4:21 PM 11/19/2016 10:01 AM Full Code [id number]  Jorge Alberto Jurado MD Inpatient         Medication Review      START taking        Dose / Directions Comments    HYDROmorphone 2 MG tablet   Commonly known as:  DILAUDID   Used for:  Status post total left knee replacement        Dose:  2-4 mg   Take 1-2 tablets (2-4 mg) by mouth every 3 hours as needed for moderate to severe pain   Quantity:  60 tablet   Refills:  0        hydrOXYzine 10 MG tablet   Commonly known as:  ATARAX   Used for:  Status post total left knee replacement        Dose:  10 mg   Take 1 tablet (10 mg) by mouth every 6 hours as needed for itching or other (adjunct pain)   Quantity:  50 tablet   Refills:  0        order for DME   Used for:  Status post total shoulder arthroplasty, right        Equipment being ordered: Quad Cane (), Raised Toilet Seats (), Bath Seat () and Other: long reach grasper and hand held shower attachment Treatment Diagnosis: s/p L TKA   Quantity:  1 Units   Refills:  0        senna-docusate 8.6-50 MG per tablet   Commonly known as:  SENOKOT-S;PERICOLACE   Used for:  Status post total left knee replacement   Notes to Patient:  Hold for loose stools        Dose:  1-2 tablet   Take 1-2 tablets by mouth 2 times daily   Quantity:  100 tablet   Refills:  1          CONTINUE these medications which may have CHANGED, or have new prescriptions. If we are uncertain of the size of tablets/capsules you have at home, strength may be listed as something that might have changed.        Dose / Directions Comments    aspirin  MG EC tablet   This may have changed:    - medication strength  - how much to take   Used for:  Status post total left knee replacement        Dose:  325 mg   Take 1 tablet (325 mg) by mouth daily   Quantity:  30  tablet   Refills:  1    May resume previous dose of 81 mg Aspirin daily once finished with 325 mg dose         CONTINUE these medications which have NOT CHANGED        Dose / Directions Comments    * albuterol 108 (90 BASE) MCG/ACT Inhaler   Commonly known as:  PROAIR HFA/PROVENTIL HFA/VENTOLIN HFA        Dose:  2 puff   Inhale 2 puffs into the lungs every 4 hours as needed   Refills:  0        * albuterol (2.5 MG/3ML) 0.083% neb solution        Dose:  2.5 mg   Inhale 2.5 mg into the lungs every 4 hours as needed   Refills:  0        fluticasone-salmeterol 250-50 MCG/DOSE diskus inhaler   Commonly known as:  ADVAIR        Dose:  1 puff   Inhale 1 puff into the lungs 2 times daily   Refills:  0        garlic 150 MG Tabs tablet        Dose:  150 mg   Take 150 mg by mouth daily   Refills:  0        lisinopril-hydrochlorothiazide 20-25 MG per tablet   Commonly known as:  PRINZIDE/ZESTORETIC        Dose:  1 tablet   Take 1 tablet by mouth 2 times daily   Refills:  0        MULTIPLE VITAMINS PO        Dose:  1 tablet   Take 1 tablet by mouth daily   Refills:  0        OMEGA-3 FISH OIL PO        Dose:  2 g   Take 2 g by mouth daily   Refills:  0        PRILOSEC OTC PO        Dose:  20 mg   Take 20 mg by mouth daily   Refills:  0        SILDENAFIL CITRATE PO        Dose:  20-40 mg   Take 20-40 mg by mouth daily as needed   Refills:  0        SPIRONOLACTONE PO        Dose:  25 mg   Take 25 mg by mouth daily   Refills:  0        VITAMIN D (CHOLECALCIFEROL) PO        Dose:  5000 Units   Take 5,000 Units by mouth daily   Refills:  0        * Notice:  This list has 2 medication(s) that are the same as other medications prescribed for you. Read the directions carefully, and ask your doctor or other care provider to review them with you.              Further instructions from your care team       1.  Follow up with Jay Saleem PA-C.  in 2 weeks for post op check and x rays as scheduled.  Call [phone] if appointment needed or  questions  2.  Use pain medication as directed  3.  Keep incision clean, covered and dry until post op appointment.  You may shower and get incision wet if no drainage is present. You may change your dressing as needed.  No additional adhesives to be put on knee (including bandages).  Only use dry  guaze over Prineo glue tape and then apply 4 inch ACE to hold dressings in place.   4.  Continue physical therapy as soon as possible.  You will need to call a therapy department of your choice to arrange future appointments.  Your order for physical therapy is included in your discharge paperwork.   5.  Take Aspirin 325 mg  daily  for 42 days for anticoagulation    Needs follow-up with PCP and cardiology to assess for prior MI and proper medication management.          Summary of Visit     Reason for your hospital stay       Left total knee arthroplasty             After Care     Activity       Your activity upon discharge: activity as tolerated, no driving until off narcotic pain medication       Diet       Follow this diet upon discharge: Orders Placed This Encounter      Regular Diet Adult         Supplies       List the supplies the pt needs to go home       Wound care and dressings       Instructions to care for your wound at home: as directed, daily dressing changes, ice to area for comfort, keep wound clean and dry and may get incision wet in shower but do not soak or scrub.             Referrals     Home Care Referral       _________________________________________________________________    Your provider has referred you to: [facility]: [facility] [phone]   [email or web address]    Extended Emergency Contact Information  Primary Emergency Contact: LAVONNE MARCANO  Address: [address] [id number] [facility]  Home Phone: [phone]  Work Phone: [phone]  Mobile Phone: [phone]  Relation:  Spouse    Patient Anticipated Discharge Date: 6/3/17   RN, PT, HHA to begin 24 - 48 hours after discharge.  PLEASE EVALUATE AND TREAT (Evaluation timeline is 24 - 48 hrs. Please call if there is need for a variance to this timeline).    REASON FOR REFERRAL: Assessment & Treatment: PT and RN    ADDITIONAL SERVICES NEEDED: None    OTHER PERTINENT INFORMATION: Patient was last seen by provider on 6/2/17 for TKA.    Current Outpatient Prescriptions:  oxyCODONE (ROXICODONE) 5 MG IR tablet, Take 1-2 tablets (5-10 mg) by mouth every 4 hours as needed for moderate to severe pain, Disp: 50 tablet, Rfl: 0  hydrOXYzine (ATARAX) 10 MG tablet, Take 1 tablet (10 mg) by mouth every 6 hours as needed for itching or other (adjunct pain), Disp: 50 tablet, Rfl: 0  senna-docusate (SENOKOT-S;PERICOLACE) 8.6-50 MG per tablet, Take 1-2 tablets by mouth 2 times daily, Disp: 100 tablet, Rfl: 1  order for DME, Equipment being ordered: Quad Cane (), Raised Toilet Seats (), Bath Seat () and Other: long reach grasper and hand held shower attachment  Treatment Diagnosis: s/p L TKA, Disp: 1 Units, Rfl: 0  aspirin  MG EC tablet, Take 1 tablet (325 mg) by mouth daily, Disp: 30 tablet, Rfl: 1      Patient Active Problem List:     Status post total shoulder arthroplasty, right     Elevated prostate specific antigen (PSA)     Acute pancreatitis     Cyst of epididymis     Gastroesophageal reflux disease     Benign essential hypertension     Hyperlipidemia     Impotence of organic origin     Low back pain     Primary osteoarthritis of both knees     Encounter for screening for malignant neoplasm of colon     Stricture of esophagus     Asthma     Status post total left knee replacement     CAD (coronary artery disease)     RITA (acute kidney injury) (H)      Documentation of Face to Face and Certification for Home Health Services    I certify that patient, Jorge Alberto Frye is under my care and that I, or a Nurse Practitioner or  Physician's Assistant working with me, had a face-to-face encounter that meets the physician face-to-face encounter requirements with this patient on: 6/2/2017.    This encounter with the patient was in whole, or in part, for the following medical condition, which is the primary reason for Home Health Care: TKA.    I certify that, based on my findings, the following services are medically necessary Home Health Services: Nursing and Physical Therapy    My clinical findings support the need for the above services because: Physical Therapy Services are needed to assess and treat the following functional impairments: TKA.    Further, I certify that my clinical findings support that this patient is homebound (i.e. absences from home require considerable and taxing effort and are for medical reasons or [employer]oroastrian services or infrequently or of short duration when for other reasons) because: Requires assistance of another person or specialized equipment to access medical services because patient: Requires supervision of another for safe transfer..    Based on the above findings, I certify that this patient is confined to the home and needs intermittent skilled nursing care, physical therapy and/or speech therapy.  The patient is under my care, and I have initiated the establishment of the plan of care.  This patient will be followed by a physician who will periodically review the plan of care.    Physician/Provider to provide follow up care: Rich Lloyd    Responsible [place] certified Physician at time of discharge: Dr. Jurado    Please be aware that coverage of these services is subject to the terms and limitations of your health insurance plan.  Call member services at your health plan with any benefit or coverage questions.       Physical Therapy Referral       *This therapy referral will be filtered to a centralized scheduling office at [facility] and the patient will receive a call to  "schedule an appointment at a [place] location most convenient for them. *     [facility] provides Physical Therapy evaluation and treatment and many specialty services across the [place] system.  If requesting a specialty program, please choose from the list below.    If you have not heard from the scheduling office within 2 business days, please call [phone] for all locations, with the exception of [place], please call [phone].  Treatment: Evaluation & Treatment  Special Instructions/Modalities: none  Special Programs: None    Please be aware that coverage of these services is subject to the terms and limitations of your health insurance plan.  Call member services at your health plan with any benefit or coverage questions.      **Note to Provider:  If you are referring outside of [facility] for the therapy appointment, please list the name of the location in the \"special instructions\" above, print the referral and give to the patient to schedule the appointment.             Follow-Up Appointment Instructions     Future Labs/Procedures    Follow-up and recommended labs and tests     Comments:    Follow up with  Jay Saleem PA-C , at (location with clinic name or city) [facility], within 2 weeks to evaluate after surgery and for hospital follow- up.      Follow-Up Appointment Instructions     Follow-up and recommended labs and tests       Follow up with  Jay Saleem PA-C , at (location with clinic name or city) [facility], within 2 weeks to evaluate after surgery and for hospital follow- up.             Statement of Approval     Ordered          06/05/17 0953  I have reviewed and agree with all the recommendations and orders detailed in this document.  EFFECTIVE NOW     Approved and electronically signed by:  Shawnee Cloud PA-C               "

## 2017-05-31 NOTE — ANESTHESIA PREPROCEDURE EVALUATION
Anesthesia Evaluation     . Pt has had prior anesthetic. Type: General and MAC    No history of anesthetic complications          ROS/MED HX    ENT/Pulmonary:     (+)LOUIE risk factors snores loudly, hypertension, tobacco use, Past use quit 1984 packs/day  Moderate Persistent asthma Treatment: Inhaler prn, Inhaled steroids, Inhaler daily and Nebulizer prn,  , . .    Neurologic:  - neg neurologic ROS     Cardiovascular:     (+) Dyslipidemia, hypertension----. : . . . :. . Previous cardiac testing       METS/Exercise Tolerance:  >4 METS   Hematologic:  - neg hematologic  ROS       Musculoskeletal:   (+) arthritis, , , other musculoskeletal- chronic LBP      GI/Hepatic:     (+) GERD Asymptomatic on medication, Other GI/Hepatic esophageal strictures      Renal/Genitourinary:  - ROS Renal section negative       Endo:     (+) Obesity, Other Endocrine Disorder h/o pancreatitis-2006.      Psychiatric:  - neg psychiatric ROS       Infectious Disease:  - neg infectious disease ROS       Malignancy:      - no malignancy   Other:    - neg other ROS                 Physical Exam  Normal systems: cardiovascular, pulmonary and dental    Airway   Mallampati: I  TM distance: >3 FB  Neck ROM: full    Dental     Cardiovascular       Pulmonary                     Anesthesia Plan      History & Physical Review  History and physical reviewed and following examination; no interval change.    ASA Status:  3 .    NPO Status:  > 8 hours    Plan for Spinal and Periph. Nerve Block for postop pain Maintenance will be Balanced.    PONV prophylaxis:  Ondansetron (or other 5HT-3) and Dexamethasone or Solumedrol       Postoperative Care  Postoperative pain management:  IV analgesics and Oral pain medications.      Consents  Anesthetic plan, risks, benefits and alternatives discussed with:  Patient, Daughter/Son and Spouse..                          .

## 2017-05-31 NOTE — OP NOTE
Total Knee Arthroplasty Operative Note      3  PLAN:  Weight bearing status: Weight bearing as tolerated   Activity: Activity as tolerated  Patient may move about with assist as indicated or with supervision   Anticoagulation plan:                 Lovenox inpatient and then  mg daily at discharge  for 42 days  Follow up plan                           Follow up in 2 week(s)        Name: Jorge Alberto Frye    PCP: Rich Lloyd    Procedure Date: 5/31/2017    Pre-operative diagnosis: degenerative joint disease   Post-operative diagnosis: Same   Procedure: Total knee arthoplasty (Left)   Surgeon: Jorge Alberto Jurado MD     Assistant(s): Jay Saleem PA-C   Anesthesia: Spinal Anesthesia   Estimated blood loss: Less than 50 ml   Drains: Hemovac   Specimens: None       Findings: See full dictated operative note for details   Complications: None       Comments: See dictated operative report for full details         Procedure and Findings:    After being informed of risks, benefits, alternatives to the procedure, patient desired to proceed, brought to the operating suite where they were placed under spinal anesthetic. Patient received 2 grams of intravenous Ancef.  Jay Saleem PA-C was present for the entire length of the case for the purposes of proper patient positioning, surgical exposure, and patient safety. A time-out verification step was completed.    Attention was directed towards the patella. A midline incision, vastus splitting minimally invasive approach was utilized. The patella was everted. It was measured at 38 mm. It was resected, remeasured and a 38 mm asymmetric patella was positioned. A protector plate was placed on the patella. Attention was directed toward the distal femur. IM guider sheila was placed. An 10 mm 5 degree distal femoral cut was completed. The IM guide sheila was then placed in the tibia. External guide sheila and tower were utilized and 9 mm button stylus was referenced off the lateral tibial  plateau and cuts were completed. The tibia was sized to a 7. Attention was directed towards the distal femur. It was sized to a 7. The 4-in-1 cutting block was placed along the epicondylar axis. It was secured with 2 pins, anterior, posterior and chamfer cuts were completed. Soft tissue balancing was then carried out. Medial release was completed.After assessing range of motion and stability a flexion/extension gap mismatch was identified.  The decision was made to convert to a posterior stabilized device.  Femoral position was verified.  The PS cutting guide was fixated with 3 pins. PCL resected and trial components were tested.  Ultimately a 11 mmPS insert gave excellent range of motion and stability throughout the range of motion. Patella was noted to track symmetrically throughout the range to motion. The tibial tray rotation was marked, size was verified, it was secured with 2 pins and punched. Trial components were then removed. The cancellous surfaces were pulsatile lavaged. One batch of [place] cement was mixed under vacuum. The tibia, femur and patella were sequentially cemented in place. The knee was held in extension with axial compression until the cement had hardened. The 11 mm insert was ultimately selected and secured Care was taken to remove any excess cement. Joint capsular injection with anesthetic solution was performed. Excellent range of motion and stability was demonstrated. A Hemovac drain was placed. The joint was copiously irrigated. Joint capsules were closed with interrupted number 1 running Stratafix. Subcutaneous tissues were closed with 2-0 Vicryl and skin was closed with 3-0 running Stratifix and Prineo wound closure. A sterile dressing was applied. Patient tolerated the procedure well without complication and returned to the [facility] in stable condition.      Jorge Alberto Jurado    Date: 5/31/2017 Time: 2:07 PM  ?    CONFIDENTIALITY NOTICE This message and any included attachments are from  [facility] and are intended only for the addressee. The information contained in this message is confidential and may constitute inside or non-public information under international, federal, or state securities laws. Unauthorized forwarding, printing, copying, distribution, or use of such information is strictly prohibited and may be unlawful. If you are not the addressee, please promptly delete this message and notify the sender of the delivery error by e-mail.

## 2017-05-31 NOTE — LETTER
Transition Communication Hand-off for Care Transitions to Next Level of Care Provider    Name: Jorge Alberto Frye  MRN #: [id number]  Primary Care Provider: Rich Lloyd  Primary Care MD Name: Dr. Lloyd  Primary Clinic: [facility] [address] [address]  Primary Care Clinic Name: [facility]  Reason for Hospitalization:  degenerative joint disease  Left knee DJD  Admit Date/Time: 5/31/2017 10:08 AM  Discharge Date: 6/5/17  Payor Source: Payor: [employer] / Plan: [employer] F[employer]EDOM PLAN / Product Type: HMO /     Readmission Assessment Measure (SEVEN) Risk Score/category: Low    Reason for Communication Hand-off Referral:  Home Care    Discharge Plan:  Home with [facility] ([phone] Fax: [phone])       Concern for non-adherence with plan of care:   Y/N No  Discharge Needs Assessment:  Needs       Most Recent Value    Transportation Available family or friend will provide    # of Referrals Placed by [facility]            Tana Gannon RN, Care Coordinator    AVS/Discharge Summary is the source of truth; this is a helpful guide for improved communication of patient story

## 2017-06-01 ENCOUNTER — APPOINTMENT (OUTPATIENT)
Dept: OCCUPATIONAL THERAPY | Facility: CLINIC | Age: 67
DRG: 470 | End: 2017-06-01
Attending: ORTHOPAEDIC SURGERY
Payer: MEDICARE

## 2017-06-01 ENCOUNTER — APPOINTMENT (OUTPATIENT)
Dept: PHYSICAL THERAPY | Facility: CLINIC | Age: 67
DRG: 470 | End: 2017-06-01
Attending: ORTHOPAEDIC SURGERY
Payer: MEDICARE

## 2017-06-01 PROBLEM — N17.9 AKI (ACUTE KIDNEY INJURY) (H): Status: ACTIVE | Noted: 2017-06-01

## 2017-06-01 LAB
GLUCOSE SERPL-MCNC: 119 MG/DL (ref 70–99)
HGB BLD-MCNC: 12.7 G/DL (ref 13.3–17.7)

## 2017-06-01 PROCEDURE — 85018 HEMOGLOBIN: CPT | Performed by: ORTHOPAEDIC SURGERY

## 2017-06-01 PROCEDURE — 97535 SELF CARE MNGMENT TRAINING: CPT | Mod: GO

## 2017-06-01 PROCEDURE — A9270 NON-COVERED ITEM OR SERVICE: HCPCS | Mod: GY | Performed by: ORTHOPAEDIC SURGERY

## 2017-06-01 PROCEDURE — 40000193 ZZH STATISTIC PT WARD VISIT: Performed by: PHYSICAL THERAPIST

## 2017-06-01 PROCEDURE — 97116 GAIT TRAINING THERAPY: CPT | Mod: GP | Performed by: REHABILITATION PRACTITIONER

## 2017-06-01 PROCEDURE — 25000128 H RX IP 250 OP 636: Performed by: ORTHOPAEDIC SURGERY

## 2017-06-01 PROCEDURE — 40000193 ZZH STATISTIC PT WARD VISIT: Performed by: REHABILITATION PRACTITIONER

## 2017-06-01 PROCEDURE — 36415 COLL VENOUS BLD VENIPUNCTURE: CPT | Performed by: ORTHOPAEDIC SURGERY

## 2017-06-01 PROCEDURE — 97110 THERAPEUTIC EXERCISES: CPT | Mod: GP | Performed by: REHABILITATION PRACTITIONER

## 2017-06-01 PROCEDURE — 99232 SBSQ HOSP IP/OBS MODERATE 35: CPT | Performed by: PHYSICIAN ASSISTANT

## 2017-06-01 PROCEDURE — 40000133 ZZH STATISTIC OT WARD VISIT

## 2017-06-01 PROCEDURE — 25000132 ZZH RX MED GY IP 250 OP 250 PS 637: Mod: GY | Performed by: ORTHOPAEDIC SURGERY

## 2017-06-01 PROCEDURE — 82947 ASSAY GLUCOSE BLOOD QUANT: CPT | Performed by: ORTHOPAEDIC SURGERY

## 2017-06-01 PROCEDURE — 97161 PT EVAL LOW COMPLEX 20 MIN: CPT | Mod: GP | Performed by: PHYSICAL THERAPIST

## 2017-06-01 PROCEDURE — 25000128 H RX IP 250 OP 636: Performed by: PHYSICIAN ASSISTANT

## 2017-06-01 PROCEDURE — 97116 GAIT TRAINING THERAPY: CPT | Mod: GP | Performed by: PHYSICAL THERAPIST

## 2017-06-01 PROCEDURE — 97165 OT EVAL LOW COMPLEX 30 MIN: CPT | Mod: GO

## 2017-06-01 PROCEDURE — 12000000 ZZH R&B MED SURG/OB

## 2017-06-01 PROCEDURE — 97110 THERAPEUTIC EXERCISES: CPT | Mod: GP | Performed by: PHYSICAL THERAPIST

## 2017-06-01 RX ORDER — AMOXICILLIN 250 MG
1-2 CAPSULE ORAL 2 TIMES DAILY
Qty: 100 TABLET | Refills: 1 | Status: SHIPPED | OUTPATIENT
Start: 2017-06-01 | End: 2017-08-03

## 2017-06-01 RX ORDER — SODIUM CHLORIDE 9 MG/ML
INJECTION, SOLUTION INTRAVENOUS CONTINUOUS
Status: DISCONTINUED | OUTPATIENT
Start: 2017-06-01 | End: 2017-06-02

## 2017-06-01 RX ORDER — LABETALOL HYDROCHLORIDE 5 MG/ML
20 INJECTION, SOLUTION INTRAVENOUS EVERY 4 HOURS PRN
Status: DISCONTINUED | OUTPATIENT
Start: 2017-06-01 | End: 2017-06-05 | Stop reason: HOSPADM

## 2017-06-01 RX ORDER — HYDROXYZINE HYDROCHLORIDE 10 MG/1
10 TABLET, FILM COATED ORAL EVERY 6 HOURS PRN
Qty: 50 TABLET | Refills: 0 | Status: SHIPPED | OUTPATIENT
Start: 2017-06-01 | End: 2017-08-03

## 2017-06-01 RX ORDER — OXYCODONE HYDROCHLORIDE 5 MG/1
5-10 TABLET ORAL EVERY 4 HOURS PRN
Qty: 50 TABLET | Refills: 0 | Status: SHIPPED | OUTPATIENT
Start: 2017-06-01 | End: 2017-06-05

## 2017-06-01 RX ADMIN — OMEPRAZOLE 20 MG: 20 CAPSULE, DELAYED RELEASE ORAL at 06:48

## 2017-06-01 RX ADMIN — CEFAZOLIN SODIUM 2 G: 2 INJECTION, SOLUTION INTRAVENOUS at 05:24

## 2017-06-01 RX ADMIN — HYDROMORPHONE HYDROCHLORIDE 0.5 MG: 1 INJECTION, SOLUTION INTRAMUSCULAR; INTRAVENOUS; SUBCUTANEOUS at 14:57

## 2017-06-01 RX ADMIN — OXYCODONE HYDROCHLORIDE 10 MG: 5 TABLET ORAL at 19:07

## 2017-06-01 RX ADMIN — HYDROXYZINE HYDROCHLORIDE 10 MG: 10 TABLET ORAL at 09:40

## 2017-06-01 RX ADMIN — OXYCODONE HYDROCHLORIDE 10 MG: 5 TABLET ORAL at 15:31

## 2017-06-01 RX ADMIN — HYDROXYZINE HYDROCHLORIDE 10 MG: 10 TABLET ORAL at 21:31

## 2017-06-01 RX ADMIN — SENNOSIDES AND DOCUSATE SODIUM 1 TABLET: 8.6; 5 TABLET ORAL at 07:25

## 2017-06-01 RX ADMIN — OXYCODONE HYDROCHLORIDE 10 MG: 5 TABLET ORAL at 06:47

## 2017-06-01 RX ADMIN — RANITIDINE HYDROCHLORIDE 150 MG: 150 TABLET, FILM COATED ORAL at 19:22

## 2017-06-01 RX ADMIN — OXYCODONE HYDROCHLORIDE 10 MG: 5 TABLET ORAL at 11:44

## 2017-06-01 RX ADMIN — SENNOSIDES AND DOCUSATE SODIUM 2 TABLET: 8.6; 5 TABLET ORAL at 19:22

## 2017-06-01 RX ADMIN — ENOXAPARIN SODIUM 40 MG: 40 INJECTION SUBCUTANEOUS at 12:57

## 2017-06-01 RX ADMIN — OXYCODONE HYDROCHLORIDE 10 MG: 5 TABLET ORAL at 22:40

## 2017-06-01 RX ADMIN — ACETAMINOPHEN 975 MG: 325 TABLET, FILM COATED ORAL at 08:56

## 2017-06-01 RX ADMIN — SODIUM CHLORIDE: 9 INJECTION, SOLUTION INTRAVENOUS at 11:44

## 2017-06-01 RX ADMIN — SODIUM CHLORIDE: 9 INJECTION, SOLUTION INTRAVENOUS at 19:07

## 2017-06-01 RX ADMIN — OXYCODONE HYDROCHLORIDE 10 MG: 5 TABLET ORAL at 00:58

## 2017-06-01 RX ADMIN — ACETAMINOPHEN 975 MG: 325 TABLET, FILM COATED ORAL at 00:58

## 2017-06-01 RX ADMIN — ACETAMINOPHEN 975 MG: 325 TABLET, FILM COATED ORAL at 16:53

## 2017-06-01 RX ADMIN — RANITIDINE HYDROCHLORIDE 150 MG: 150 TABLET, FILM COATED ORAL at 07:25

## 2017-06-01 NOTE — PROGRESS NOTES
SPIRITUAL HEALTH SERVICES  SPIRITUAL ASSESSMENT Progress Note  [facility]    PRIMARY FOCUS:     Emotional/spiritual/Restoration distress    Support for coping    ILLNESS CIRCUMSTANCES:   Reviewed documentation. Reflective conversation shared with Jorge Alberto Frye which integrated elements of illness narratives.     Context of Serious Illness/Symptom(s) - Left TKA    Resources for Support - Wife, Jyotsna, and network of family and friends    DISTRESS:     Emotional/Existential/Relational Distress - None today; David stated he was very happy with the work that Dr Jurado did on his R Shoulder back in Nov 16    Spiritual/Methodist Distress - None; David reflected on his gratitude to God for the technology that makes surgeries like he has had possible    Social/Cultural/Economic Distress - None; David stated things were going well today and that his door is revolving with therapists and other care providers.      SPIRITUAL/Sabianism COPING:     Christian/Angie - Evangelical    Spiritual Practice(s) - History of being very active in his [employer] and other spiritual endeavors    Emotional/Existential/Relational Connections - None mentioned in this visit; other therapists were waiting and David said he would welcome a follow up visit    GOALS OF CARE:    Goals of Care - To go through the rehab necessary to bring his TKA to a good place    Meaning/Sense-Making - Family, angie and being active in many areas is important to David    PLAN: I will check back with David tomorrow for a follow up visit as requested    Gilbert Springer M.A., [facility]  Staff   [facility]  Office: [phone]  Cell: [phone]  Pager [phone]

## 2017-06-01 NOTE — PLAN OF CARE
Problem: Goal Outcome Summary  Goal: Goal Outcome Summary  PT-  Evaluation completed, Rx initiated. Pain controlled. Pt required CGA sit<> stand w/ walker.    Pt amb. 70 feet x1 with RW CGA. steady gait, initial steps antalgic, thad improved w/ distance     REc-  Home w/ home PT to progress ROM/strengthening and gait activities

## 2017-06-01 NOTE — PROGRESS NOTES
[facility] Medicine Progress Note  Date of Service: 06/01/2017    Assessment & Plan   Jorge Alberto Frye is a 66 year old male who presented on 5/31/2017 for scheduled Procedure(s):  ARTHROPLASTY KNEE by Jorge Alberto Jurado MD and is being followed by the hospital medicine service for co-management of acute and/or chronic perioperative medical problems.    S/p Procedure(s):  ARTHROPLASTY KNEE  - POD #1  - pain control, wound cares, physical therapy, occupational therapy and DVT prophylaxis per orthopedic surgery service    RITA (acute kidney injury) (H)  Unclear etiology.  Baseline creatinine 0.9 - 1.2.  On POD#1, creatinine 1.44  - d/c home spironolactone, HCTZ, lisinopril until kidney function has improved  - 20mg IV labetalol PRN (with parameters) given holding all above antihypertensives  - repeat creatinine in AM  - continue IVF today at 125cc/hr; consider stopping tomorrow AM    Gastroesophageal reflux disease  - continue PTA Prilosec      Benign essential hypertension  - d/c home spironolactone, HCTZ, lisinopril until kidney function has improved      Hyperlipidemia  Not using medication.      Asthma  - continue PTA Advair, albuterol      CAD (coronary artery disease)  Questionable MI while in [place] 03/2017 - no records.  On daily aspirin previous to surgery.  Did not follow up with PCP (with exception of pre-op visit) nor cardiology upon returning home  - recommend further discussion with PCP as an outpatient in regards to event, possible cardiology consultation.        DVT Prophylaxis: as per orthopedic surgery service - Defer to primary service  Code Status: Full Code    Lines: PIV, without edema/erythema   Luke catheter: not indicated    Discussion: Medically, the patient appears stable.    Disposition: Anticipate discharge 1-2 days.  Patient plans to home with outpatient PT.     Attestation:  I have reviewed today's vital signs, notes, medications, labs and  imaging.    Alejandrina Curran PA-C      Interval History   Reports pain to left knee is present, but manageable.  Passing flatus.  Voiding freely since removal of Luke.  Denies fever, CP, SOB, new cough, abdominal pain, dysuria, and lower extremity pain/swelling.  Denies history of significant kidney issues.      Physical Exam   Temp:  [97.2  F (36.2  C)-98.4  F (36.9  C)] 97.5  F (36.4  C)  Pulse:  [71-72] 71  Heart Rate:  [68-93] 85  Resp:  [8-16] 16  BP: (108-142)/(49-77) 121/62  SpO2:  [92 %-97 %] 92 %    Weights:   Vitals:    05/31/17 1027   Weight: 122.5 kg (270 lb)    Body mass index is 32.87 kg/(m^2).    General: alert and oriented x4, in no acute distress  CV: Regular rate/rhythm, no appreciable murmur, rub, or gallop  Respiratory: CTA bilaterally, equal chest expansion  GI: Soft, nontender, hypoactive BS  Skin: Warm and dry  Musculoskeletal: Negative homans bilaterally.  Non-tender to palpation of posterior calves. No edema to lower extremities.  Neuro: equal  strength    Data     Recent Labs  Lab 05/31/17  2220 05/31/17  1038   WBC  --  7.3   HGB  --  15.3   MCV  --  90   PLT  --  249   INR  --  1.04   CR 1.44*  --        No results for input(s): GLC, BGM in the last 168 hours.     Unresulted Labs Ordered in the Past 30 Days of this Admission     Date and Time Order Name Status Description    6/1/2017 0001 Glucose In process     6/1/2017 0001 Hemoglobin In process            Imaging  Recent Results (from the past 24 hour(s))   XR Knee Port Left 1/2 Views    Narrative    XR KNEE PORT LT 1/2 VW 5/31/2017 2:50 PM    HISTORY: Post-Op Total Knee    COMPARISON: None.    FINDINGS: Left knee arthroplasty hardware components are well-seated  without radiographic evidence of complication.      Impression    IMPRESSION: Left knee arthroplasty.    EUFEMIA BUSBY MD        I reviewed all new labs and imaging results over the last 24 hours. I personally reviewed no images or EKG's today.    Medications     NaCl  Stopped (06/01/17 0650)       fluticasone-salmeterol  1 puff Inhalation Q12H     omeprazole  20 mg Oral Daily     sodium chloride (PF)  3 mL Intracatheter Q8H     enoxaparin  40 mg Subcutaneous Q24H     ranitidine  150 mg Oral BID     acetaminophen  975 mg Oral Q8H     senna-docusate  1-2 tablet Oral BID       Alejandrina Curran PA-C

## 2017-06-01 NOTE — PLAN OF CARE
Problem: Goal Outcome Summary  Goal: Goal Outcome Summary  OT: Eval complete and treatment initiated. Spent time educating pt on AE/DME to increase ease and independence of ADLs. Pt plans on getting extended tub bench, commode for over toilet to increase height and add B UE support and sock aid. Educated pt on multiple locations to purchase items, verbalizes understanding. Following education pt able to demo independence with lower body dressing.      REC: Home with assist from spouse as needed. Per pt spouse will be getting AE/DME.

## 2017-06-01 NOTE — PLAN OF CARE
Problem: Knee Replacement, Total (Adult)  Goal: Signs and Symptoms of Listed Potential Problems Will be Absent or Manageable (Knee Replacement, Total)  Signs and symptoms of listed potential problems will be absent or manageable by discharge/transition of care (reference Knee Replacement, Total (Adult) CPG).   Outcome: Improving  patients pain is controlled on PO pain medication, CMS is intact to left lower extremity will continue to monitor

## 2017-06-01 NOTE — DISCHARGE INSTRUCTIONS
1.  Follow up with Jay Saleem PA-C.  in 2 weeks for post op check and x rays as scheduled.  Call [phone] if appointment needed or questions  2.  Use pain medication as directed  3.  Keep incision clean, covered and dry until post op appointment.  You may shower and get incision wet if no drainage is present. You may change your dressing as needed.  No additional adhesives to be put on knee (including bandages).  Only use dry  guaze over Prineo glue tape and then apply 4 inch ACE to hold dressings in place.   4.  Continue physical therapy as soon as possible.  You will need to call a therapy department of your choice to arrange future appointments.  Your order for physical therapy is included in your discharge paperwork.   5.  Take Aspirin 325 mg  daily  for 42 days for anticoagulation    Needs follow-up with PCP and cardiology to assess for prior MI and proper medication management.

## 2017-06-01 NOTE — PROGRESS NOTES
06/01/17 0819   Quick Adds   Type of Visit Initial PT Evaluation   Living Environment   Lives With spouse   Living Arrangements house   Home Accessibility stairs to enter home   Number of Stairs to Enter Home 3  (no railing )   Stair Railings at Home none   Transportation Available family or friend will provide   Self-Care   Dominant Hand right   Regular Exercise yes   Activity/Exercise Type walking   Functional Level Prior   Ambulation 0-->independent   Transferring 0-->independent   Toileting 0-->independent   Bathing 0-->independent   Dressing 0-->independent   Eating 0-->independent   Communication 0-->understands/communicates without difficulty   Swallowing 0-->swallows foods/liquids without difficulty   Cognition 0 - no cognition issues reported   Fall history within last six months no   Prior Functional Level Comment PLOf-  Pt  indep. with ambulation with no device, pain.    General Information   Onset of Illness/Injury or Date of Surgery - Date 05/31/17   Referring Physician Comfort   Patient/Family Goals Statement Pt w/ goal of DC to home   Pertinent History of Current Problem (include personal factors and/or comorbidities that impact the POC) degenerative joint disease; Total knee arthoplasty (Left   Weight-Bearing Status - LLE weight-bearing as tolerated   General Observations Pt alert, up in the chair= reporting pqai at 3/10 at rest;  5/10 with WB   Cognitive Status Examination   Orientation orientation to person, place and time   Level of Consciousness alert   Follows Commands and Answers Questions 100% of the time   Personal Safety and Judgment intact   Pain Assessment   Patient Currently in Pain Yes, see Vital Sign flowsheet   Integumentary/Edema   Integumentary/Edema Comments NT- compression wrap, hemo vac inplace   Range of Motion (ROM)   ROM Comment L knee AROM 20- 90   Strength   Strength Comments Indep. SLR   MMT: Knee, Rehab Eval   Knee Flexion - Left Side (3/5) fair, left   Knee Extension -  "Left Side (3/5) fair, left   Bed Mobility   Bed Mobility Comments SBA supine<> sitting    Transfer Skills   Transfer Comments CGA sit<. >stand w/ walker, cues   Gait   Gait Comments Pt instructed on WBAT, gait sequence w/ the walker. discuused ht adjustment for home walker.  Pt amb. 70 feet x1 with R, CGA. steady gait, initial steps antalgic, thad improved w/ distance   Balance   Balance Comments good dynamic standing balance w/ walker   Sensory Examination   Sensory Perception no deficits were identified   Modality Interventions   Planned Modality Interventions Cryotherapy   General Therapy Interventions   Planned Therapy Interventions gait training;ROM;strengthening;progressive activity/exercise   Clinical Impression   Criteria for Skilled Therapeutic Intervention yes, treatment indicated   PT Diagnosis Left knee TKA   Influenced by the following impairments Decreased ROm/ strength    Functional limitations due to impairments Altered mobility- decreased ambulatory status, decreased indep   Clinical Presentation Stable/Uncomplicated   Clinical Presentation Rationale clincial judgement   Clinical Decision Making (Complexity) Low complexity   Therapy Frequency` 2 times/day   Predicted Duration of Therapy Intervention (days/wks) 1 day   Anticipated Equipment Needs at Discharge (Pt own a walker ,  for home use ; needs SEC for stair amb)   Anticipated Discharge Disposition Home with Home Therapy   Risk & Benefits of therapy have been explained Yes   Patient, Family & other staff in agreement with plan of care Yes   [facility] AM-PAC  \"6 Clicks\" V.2 Basic Mobility Inpatient Short Form   1. Turning from your back to your side while in a flat bed without using bedrails? 3 - A Little   2. Moving from lying on your back to sitting on the side of a flat bed without using bedrails? 3 - A Little   3. Moving to and from a bed to a chair (including a wheelchair)? 3 - A Little   4. Standing up from a chair using your arms " (e.g., wheelchair, or bedside chair)? 3 - A Little   5. To walk in hospital room? 3 - A Little   6. Climbing 3-5 steps with a railing? 2 - A Lot   Basic Mobility Raw Score (Score out of 24.Lower scores equate to lower levels of function) 17

## 2017-06-01 NOTE — PLAN OF CARE
Problem: Knee Replacement, Total (Adult)  Goal: Signs and Symptoms of Listed Potential Problems Will be Absent or Manageable (Knee Replacement, Total)  Signs and symptoms of listed potential problems will be absent or manageable by discharge/transition of care (reference Knee Replacement, Total (Adult) CPG).   Outcome: No Change  Pt slept quietly overnight with CAPNO on without issues. Reg snoring resp. Complains of dry throat and hoarse voice this am, drinking coffee.   Pain controlled with Oxycodone PRN.   Ti Schmidt this am.   Hemovac patent with 150 out overnight.   Using IS indeply up to 3250.

## 2017-06-01 NOTE — PHARMACY - DISCHARGE MEDICATION RECONCILIATION
Discharge medication review for this patient is complete. Pharmacist assisted with medication reconciliation of discharge medications with prior to admission medications.     The following changes were made to the discharge medication list based on pharmacist review:  Added:  none  Discontinued: none  Changed: none      Patient's Discharge Medication List  - medications as listed on After Visit Summary (AVS)     Review of your medicines      START taking       Dose / Directions    hydrOXYzine 10 MG tablet   Commonly known as:  ATARAX        Dose:  10 mg   Take 1 tablet (10 mg) by mouth every 6 hours as needed for itching or other (adjunct pain)   Quantity:  50 tablet   Refills:  0       order for DME   Used for:  Status post total shoulder arthroplasty, right        Equipment being ordered: Quad Cane (), Raised Toilet Seats (), Bath Seat () and Other: long reach grasper and hand held shower attachment Treatment Diagnosis: s/p L TKA   Quantity:  1 Units   Refills:  0       oxyCODONE 5 MG IR tablet   Commonly known as:  ROXICODONE        Dose:  5-10 mg   Take 1-2 tablets (5-10 mg) by mouth every 4 hours as needed for moderate to severe pain   Quantity:  50 tablet   Refills:  0       senna-docusate 8.6-50 MG per tablet   Commonly known as:  SENOKOT-S;PERICOLACE        Dose:  1-2 tablet   Take 1-2 tablets by mouth 2 times daily   Quantity:  100 tablet   Refills:  1         CONTINUE these medicines which may have CHANGED, or have new prescriptions. If we are uncertain of the size of tablets/capsules you have at home, strength may be listed as something that might have changed.       Dose / Directions    aspirin  MG EC tablet   This may have changed:    - medication strength  - how much to take        Dose:  325 mg   Take 1 tablet (325 mg) by mouth daily   Quantity:  30 tablet   Refills:  1         CONTINUE these medicines which have NOT CHANGED       Dose / Directions    * albuterol 108 (90 BASE)  MCG/ACT Inhaler   Commonly known as:  PROAIR HFA/PROVENTIL HFA/VENTOLIN HFA        Dose:  2 puff   Inhale 2 puffs into the lungs every 4 hours as needed   Refills:  0       * albuterol (2.5 MG/3ML) 0.083% neb solution        Dose:  2.5 mg   Inhale 2.5 mg into the lungs every 4 hours as needed   Refills:  0       fluticasone-salmeterol 250-50 MCG/DOSE diskus inhaler   Commonly known as:  ADVAIR        Dose:  1 puff   Inhale 1 puff into the lungs 2 times daily   Refills:  0       garlic 150 MG Tabs tablet        Dose:  150 mg   Take 150 mg by mouth daily   Refills:  0       lisinopril-hydrochlorothiazide 20-25 MG per tablet   Commonly known as:  PRINZIDE/ZESTORETIC        Dose:  1 tablet   Take 1 tablet by mouth 2 times daily   Refills:  0       MULTIPLE VITAMINS PO        Dose:  1 tablet   Take 1 tablet by mouth daily   Refills:  0       OMEGA-3 FISH OIL PO        Dose:  2 g   Take 2 g by mouth daily   Refills:  0       PRILOSEC OTC PO        Dose:  20 mg   Take 20 mg by mouth daily   Refills:  0       SILDENAFIL CITRATE PO        Dose:  20-40 mg   Take 20-40 mg by mouth daily as needed   Refills:  0       SPIRONOLACTONE PO        Dose:  25 mg   Take 25 mg by mouth daily   Refills:  0       VITAMIN D (CHOLECALCIFEROL) PO        Dose:  5000 Units   Take 5,000 Units by mouth daily   Refills:  0       * Notice:  This list has 2 medication(s) that are the same as other medications prescribed for you. Read the directions carefully, and ask your doctor or other care provider to review them with you.         Where to get your medicines      These medications were sent to [facility] [address]  [address] [address]     Phone:  [phone]      aspirin  MG EC tablet     hydrOXYzine 10 MG tablet     senna-docusate 8.6-50 MG per tablet         Some of these will need a paper prescription and others can be bought over the counter. Ask your nurse if you  have questions.     Bring a paper prescription for each of these medications      order for DME     oxyCODONE 5 MG IR tablet

## 2017-06-01 NOTE — PROGRESS NOTES
"[facility] Orthopaedics Progress Note      Post-operative Day: 1 Day Post-Op    Procedure(s):  Left Total Knee Arthroplasty - Wound Class: I-Clean      Subjective:    Pain: moderate  aching and dull to L knee. Denies shooting pain, parasthesias, numbness and weakness.   denies Chest pain, SOB, O2 required: None  denies nausea/ emesis  denies lightheadedness/ dizziness         Objective:  Blood pressure 102/53, pulse 71, temperature 98  F (36.7  C), temperature source Oral, resp. rate 20, height 1.93 m (6' 4\"), weight 122.5 kg (270 lb), SpO2 94 %.    Patient Vitals for the past 24 hrs:   BP Temp Temp src Pulse Heart Rate Resp SpO2 Height   06/01/17 1117 102/53 98  F (36.7  C) Oral - 73 20 94 % -   06/01/17 0721 106/62 97.9  F (36.6  C) Oral - 66 18 94 % -   06/01/17 0200 - - - - - - 92 % -   06/01/17 0118 - - - - - - 93 % -   06/01/17 0018 121/62 97.5  F (36.4  C) Oral 71 - 16 94 % -   05/31/17 1828 140/70 - - - 85 16 94 % -   05/31/17 1715 127/76 - - - 93 16 93 % -   05/31/17 1618 110/62 97.2  F (36.2  C) Oral - 84 14 94 % 1.93 m (6' 4\")   05/31/17 1545 118/63 - - - 68 10 95 % -   05/31/17 1530 129/76 - - - 80 16 96 % -   05/31/17 1515 127/62 - - - 68 16 93 % -   05/31/17 1500 124/69 - - - 74 8 95 % -   05/31/17 1445 112/49 - - - 77 13 94 % -   05/31/17 1430 108/55 98.4  F (36.9  C) Oral - 80 15 93 % -       Wt Readings from Last 4 Encounters:   05/31/17 122.5 kg (270 lb)   11/17/16 125.2 kg (276 lb 0.3 oz)     General: Alert and orientated. No apparent distress. Non-labored breathing. Appropriate affect.   MSK: L knee: dressing Clean, dry, and intact. Skin intact, no ecchymosis, no erythema. tender to palpation to incision site. ROM appropriate for post op. Distal neurovascularly intact. Compartments soft and non-tender. No calf pain. Homans negative. PF/DF and EHL intact.       Pertinent Labs   Lab Results: personally reviewed.     Recent Labs   Lab Test  06/01/17   0645  05/31/17   1038  11/19/16   0635   " 11/17/16   0815   INR   --   1.04   --    --   0.95   HGB  12.7*  15.3  13.4   < >  14.9   HCT   --   45.8   --    --   43.7   MCV   --   90   --    --   90   PLT   --   249   --    --   236    < > = values in this interval not displayed.       Plan: Anticoagulation protocol: Lovenox inpatient and then  mg daily at discharge  x 42  days            Pain medications:  dilaudid, oxycodone and vistaril            Weight bearing status:  WBAT            Disposition:  Home in 1-2 days             Follow up: 2 week with ARGENIS            Continue cares and rehabilitation     Report completed by:  Shawnee Cloud PA-C  Date: 6/1/2017  Time: 11:35 AM

## 2017-06-01 NOTE — PROGRESS NOTES
06/01/17 1000   Quick Adds   Type of Visit Initial Occupational Therapy Evaluation   Living Environment   Lives With spouse   Living Arrangements house   Home Accessibility stairs to enter home   Number of Stairs to Enter Home 3  (no railing)   Stair Railings at Home none   Transportation Available family or friend will provide   Self-Care   Dominant Hand right   Regular Exercise yes   Activity/Exercise Type walking   Activity/Exercise/Self-Care Comment Planning on purchasing commode for over toilet, extended tub bench, reacher and sock aid. States wife will obtain this.    Functional Level Prior   Ambulation 0-->independent   Transferring 0-->independent   Toileting 0-->independent   Bathing 0-->independent   Dressing 0-->independent   Eating 0-->independent   Communication 0-->understands/communicates without difficulty   Swallowing 0-->swallows foods/liquids without difficulty   Cognition 0 - no cognition issues reported   Fall history within last six months no   General Information   Onset of Illness/Injury or Date of Surgery - Date 05/31/17   Referring Physician Jorge Alberto Jurado MD   Patient/Family Goals Statement To return home   Additional Occupational Profile Info/Pertinent History of Current Problem degenerative joint disease; Total knee arthoplasty (Left   Precautions/Limitations (knee precautions)   Weight-Bearing Status - LLE weight-bearing as tolerated   Cognitive Status Examination   Orientation orientation to person, place and time   Level of Consciousness alert   Pain Assessment   Patient Currently in Pain Yes, see Vital Sign flowsheet   Transfer Skill: Sit to Stand   Level of [place]: Sit/Stand stand-by assist   Physical Assist/Nonphysical Assist: Sit/Stand 1 person assist   Transfer Skill: Sit to Stand weight-bearing as tolerated   Assistive Device for Transfer: Sit/Stand standard walker   Transfer Skill: Toilet Transfer   Level of [place]: Toilet contact guard   Physical  "Assist/Nonphysical Assist: Toilet 1 person assist   Weight-Bearing Restrictions: Toilet weight-bearing as tolerated   Assistive Device standard walker   Upper Body Dressing   Level of [place]: Dress Upper Body independent   Lower Body Dressing   Level of [place]: Dress Lower Body stand-by assist   Physical Assist/Nonphysical Assist: Dress Lower Body supervision   Assistive Device sock-aid;reacher   Instrumental Activities of Daily Living (IADL)   IADL Comments Wife can assist with IADLs upon discharge.    Activities of Daily Living Analysis   Impairments Contributing to Impaired Activities of Daily Living pain;post surgical precautions   General Therapy Interventions   Planned Therapy Interventions ADL retraining   Clinical Impression   Criteria for Skilled Therapeutic Interventions Met yes, treatment indicated   OT Diagnosis decreased independence with ADLs.    Influenced by the following impairments increased knee pain.    Assessment of Occupational Performance 1-3 Performance Deficits   Identified Performance Deficits tub/shower transfer, lower body dressing   Clinical Decision Making (Complexity) Low complexity   Therapy Frequency daily   Predicted Duration of Therapy Intervention (days/wks) 2 days   Anticipated Equipment Needs at Discharge reacher;sock aide;tub bench;raised toilet seat   Anticipated Discharge Disposition Home with Assist   Risks and Benefits of Treatment have been explained. Yes   Patient, Family & other staff in agreement with plan of care Yes   [facility] AM-PAC  \"6 Clicks\" Daily Activity Inpatient Short Form   1. Putting on and taking off regular lower body clothing? 3 - A Little   2. Bathing (including washing, rinsing, drying)? 3 - A Little   3. Toileting, which includes using toilet, bedpan or urinal? 3 - A Little   4. Putting on and taking off regular upper body clothing? 4 - None   5. Taking care of personal grooming such as brushing teeth? 4 - None   6. Eating meals? 4 " - None   Daily Activity Raw Score (Score out of 24.Lower scores equate to lower levels of function) 21   Total Evaluation Time   Total Evaluation Time (Minutes) 8     Margo Stahl, OTR/L

## 2017-06-02 ENCOUNTER — APPOINTMENT (OUTPATIENT)
Dept: ULTRASOUND IMAGING | Facility: CLINIC | Age: 67
DRG: 470 | End: 2017-06-02
Attending: PHYSICIAN ASSISTANT
Payer: MEDICARE

## 2017-06-02 ENCOUNTER — APPOINTMENT (OUTPATIENT)
Dept: PHYSICAL THERAPY | Facility: CLINIC | Age: 67
DRG: 470 | End: 2017-06-02
Attending: ORTHOPAEDIC SURGERY
Payer: MEDICARE

## 2017-06-02 LAB
BASOPHILS # BLD AUTO: 0 10E9/L (ref 0–0.2)
BASOPHILS NFR BLD AUTO: 0.2 %
CREAT SERPL-MCNC: 1.08 MG/DL (ref 0.66–1.25)
CRP SERPL-MCNC: 79.6 MG/L (ref 0–8)
DIFFERENTIAL METHOD BLD: ABNORMAL
EOSINOPHIL # BLD AUTO: 0.1 10E9/L (ref 0–0.7)
EOSINOPHIL NFR BLD AUTO: 0.9 %
ERYTHROCYTE [DISTWIDTH] IN BLOOD BY AUTOMATED COUNT: 13.3 % (ref 10–15)
ERYTHROCYTE [SEDIMENTATION RATE] IN BLOOD BY WESTERGREN METHOD: 20 MM/H (ref 0–20)
GFR SERPL CREATININE-BSD FRML MDRD: 68 ML/MIN/1.7M2
GLUCOSE SERPL-MCNC: 94 MG/DL (ref 70–99)
HCT VFR BLD AUTO: 37.1 % (ref 40–53)
HGB BLD-MCNC: 12.4 G/DL (ref 13.3–17.7)
HGB BLD-MCNC: 12.5 G/DL (ref 13.3–17.7)
IMM GRANULOCYTES # BLD: 0 10E9/L (ref 0–0.4)
IMM GRANULOCYTES NFR BLD: 0.2 %
LYMPHOCYTES # BLD AUTO: 1.1 10E9/L (ref 0.8–5.3)
LYMPHOCYTES NFR BLD AUTO: 11.6 %
MCH RBC QN AUTO: 31 PG (ref 26.5–33)
MCHC RBC AUTO-ENTMCNC: 33.7 G/DL (ref 31.5–36.5)
MCV RBC AUTO: 92 FL (ref 78–100)
MONOCYTES # BLD AUTO: 1.4 10E9/L (ref 0–1.3)
MONOCYTES NFR BLD AUTO: 15.1 %
NEUTROPHILS # BLD AUTO: 6.6 10E9/L (ref 1.6–8.3)
NEUTROPHILS NFR BLD AUTO: 72 %
PLATELET # BLD AUTO: 189 10E9/L (ref 150–450)
RBC # BLD AUTO: 4.03 10E12/L (ref 4.4–5.9)
WBC # BLD AUTO: 9.2 10E9/L (ref 4–11)

## 2017-06-02 PROCEDURE — 25000132 ZZH RX MED GY IP 250 OP 250 PS 637: Mod: GY | Performed by: PHYSICIAN ASSISTANT

## 2017-06-02 PROCEDURE — A9270 NON-COVERED ITEM OR SERVICE: HCPCS | Mod: GY | Performed by: ORTHOPAEDIC SURGERY

## 2017-06-02 PROCEDURE — 12000000 ZZH R&B MED SURG/OB

## 2017-06-02 PROCEDURE — 82947 ASSAY GLUCOSE BLOOD QUANT: CPT | Performed by: ORTHOPAEDIC SURGERY

## 2017-06-02 PROCEDURE — 25000128 H RX IP 250 OP 636: Performed by: ORTHOPAEDIC SURGERY

## 2017-06-02 PROCEDURE — 25000128 H RX IP 250 OP 636: Performed by: PHYSICIAN ASSISTANT

## 2017-06-02 PROCEDURE — A9270 NON-COVERED ITEM OR SERVICE: HCPCS | Mod: GY | Performed by: PHYSICIAN ASSISTANT

## 2017-06-02 PROCEDURE — 85025 COMPLETE CBC W/AUTO DIFF WBC: CPT | Performed by: PHYSICIAN ASSISTANT

## 2017-06-02 PROCEDURE — 86140 C-REACTIVE PROTEIN: CPT | Performed by: PHYSICIAN ASSISTANT

## 2017-06-02 PROCEDURE — 25000132 ZZH RX MED GY IP 250 OP 250 PS 637: Mod: GY | Performed by: ORTHOPAEDIC SURGERY

## 2017-06-02 PROCEDURE — 99233 SBSQ HOSP IP/OBS HIGH 50: CPT | Performed by: PHYSICIAN ASSISTANT

## 2017-06-02 PROCEDURE — 85652 RBC SED RATE AUTOMATED: CPT | Performed by: PHYSICIAN ASSISTANT

## 2017-06-02 PROCEDURE — 36415 COLL VENOUS BLD VENIPUNCTURE: CPT | Performed by: ORTHOPAEDIC SURGERY

## 2017-06-02 PROCEDURE — 93971 EXTREMITY STUDY: CPT | Mod: LT

## 2017-06-02 PROCEDURE — 82565 ASSAY OF CREATININE: CPT | Performed by: ORTHOPAEDIC SURGERY

## 2017-06-02 RX ORDER — LISINOPRIL/HYDROCHLOROTHIAZIDE 10-12.5 MG
2 TABLET ORAL 2 TIMES DAILY
Status: DISCONTINUED | OUTPATIENT
Start: 2017-06-02 | End: 2017-06-05 | Stop reason: HOSPADM

## 2017-06-02 RX ORDER — DIPHENHYDRAMINE HCL 25 MG
25 CAPSULE ORAL EVERY 6 HOURS
Status: DISCONTINUED | OUTPATIENT
Start: 2017-06-02 | End: 2017-06-05

## 2017-06-02 RX ORDER — SODIUM CHLORIDE 9 MG/ML
INJECTION, SOLUTION INTRAVENOUS CONTINUOUS
Status: DISCONTINUED | OUTPATIENT
Start: 2017-06-02 | End: 2017-06-04 | Stop reason: CLARIF

## 2017-06-02 RX ADMIN — ACETAMINOPHEN 975 MG: 325 TABLET, FILM COATED ORAL at 00:07

## 2017-06-02 RX ADMIN — HYDROMORPHONE HYDROCHLORIDE 0.5 MG: 1 INJECTION, SOLUTION INTRAMUSCULAR; INTRAVENOUS; SUBCUTANEOUS at 00:07

## 2017-06-02 RX ADMIN — LISINOPRIL AND HYDROCHLOROTHIAZIDE 2 TABLET: 12.5; 1 TABLET ORAL at 08:57

## 2017-06-02 RX ADMIN — ENOXAPARIN SODIUM 40 MG: 40 INJECTION SUBCUTANEOUS at 14:06

## 2017-06-02 RX ADMIN — SODIUM CHLORIDE: 9 INJECTION, SOLUTION INTRAVENOUS at 16:45

## 2017-06-02 RX ADMIN — RANITIDINE HYDROCHLORIDE 150 MG: 150 TABLET, FILM COATED ORAL at 19:38

## 2017-06-02 RX ADMIN — ACETAMINOPHEN 975 MG: 325 TABLET, FILM COATED ORAL at 08:57

## 2017-06-02 RX ADMIN — HYDROXYZINE HYDROCHLORIDE 10 MG: 10 TABLET ORAL at 18:01

## 2017-06-02 RX ADMIN — SODIUM CHLORIDE: 9 INJECTION, SOLUTION INTRAVENOUS at 03:50

## 2017-06-02 RX ADMIN — LISINOPRIL AND HYDROCHLOROTHIAZIDE 2 TABLET: 12.5; 1 TABLET ORAL at 19:38

## 2017-06-02 RX ADMIN — ACETAMINOPHEN 975 MG: 325 TABLET, FILM COATED ORAL at 16:36

## 2017-06-02 RX ADMIN — SENNOSIDES AND DOCUSATE SODIUM 2 TABLET: 8.6; 5 TABLET ORAL at 08:57

## 2017-06-02 RX ADMIN — DIPHENHYDRAMINE HYDROCHLORIDE 25 MG: 25 CAPSULE ORAL at 16:36

## 2017-06-02 RX ADMIN — HYDROMORPHONE HYDROCHLORIDE 0.5 MG: 1 INJECTION, SOLUTION INTRAMUSCULAR; INTRAVENOUS; SUBCUTANEOUS at 09:36

## 2017-06-02 RX ADMIN — RANITIDINE HYDROCHLORIDE 150 MG: 150 TABLET, FILM COATED ORAL at 08:57

## 2017-06-02 RX ADMIN — HYDROXYZINE HYDROCHLORIDE 10 MG: 10 TABLET ORAL at 07:14

## 2017-06-02 RX ADMIN — OXYCODONE HYDROCHLORIDE 10 MG: 5 TABLET ORAL at 22:09

## 2017-06-02 RX ADMIN — SENNOSIDES AND DOCUSATE SODIUM 2 TABLET: 8.6; 5 TABLET ORAL at 19:38

## 2017-06-02 RX ADMIN — OXYCODONE HYDROCHLORIDE 10 MG: 5 TABLET ORAL at 03:50

## 2017-06-02 RX ADMIN — OMEPRAZOLE 20 MG: 20 CAPSULE, DELAYED RELEASE ORAL at 08:57

## 2017-06-02 RX ADMIN — OXYCODONE HYDROCHLORIDE 10 MG: 5 TABLET ORAL at 18:01

## 2017-06-02 RX ADMIN — OXYCODONE HYDROCHLORIDE 10 MG: 5 TABLET ORAL at 14:06

## 2017-06-02 RX ADMIN — OXYCODONE HYDROCHLORIDE 10 MG: 5 TABLET ORAL at 08:26

## 2017-06-02 RX ADMIN — DIPHENHYDRAMINE HYDROCHLORIDE 25 MG: 25 CAPSULE ORAL at 11:36

## 2017-06-02 NOTE — PROGRESS NOTES
"[facility] [place] Medicine Progress Note  Date of Service: 06/02/2017    Assessment & Plan   Jorge Alberto Frye is a 66 year old male who presented on 5/31/2017 for scheduled Procedure(s):  ARTHROPLASTY KNEE by Jorge Alberto Jurado MD and is being followed by the hospital medicine service for co-management of acute and/or chronic perioperative medical problems.    S/p Procedure(s):  ARTHROPLASTY KNEE  - POD #2  - pain control, wound cares, physical therapy, occupational therapy and DVT prophylaxis per orthopedic surgery service    Increasing Knee Pain  New, diffuse erythema  Inability to Flex knee  New erythema noted this morning both above and below the knee.  New area of crepitus to lateral knee.  Erythema marked 06/02 at 09:35.  Exquisitely tender to the touch.  Unable to bend knee.  Denies fever, chills, nausea, changes to appetite  - STAT CRP, ESR, CBC with platelet/diff ordered  - STAT US of Left LE, rule out DVT  - Ortho notified, to see first this AM     RITA (acute kidney injury) (H)  Unclear etiology.  Baseline creatinine 0.9 - 1.2.  On POD#1, creatinine 1.44.  POD #2, 1.08.  Fluids continued given above unclear erythema  - d/c PRN 20mg IV labetalol given resumption of home medication  - continue IVF today at 50cc/hr given above erythema    CAD (coronary artery disease)  Questionable MI while in [place] 03/2017 - upon interview, labs obtained (WBC was 10.5.  Troponin-T was 23.3 [range 0-14.0]).  Patient had both an EKG and ECHO, but does not know the results of these tests and left the hospital AMA.  On daily aspirin previous to surgery.  Informed PCP during pre-op.    - recommend further discussion with PCP as an outpatient in regards to event, possible cardiology consultation.   - will likely benefit from stress testing (reports he had one \"many\" years ago    Gastroesophageal reflux disease  - continue PTA Prilosec      Benign essential hypertension  BP reviewed, 130/70  - add " "back home HCTZ/lisinopril today  - continue to hold home spironolactone; consider resuming tomorrow      Hyperlipidemia  Not using medication.      Asthma  - continue PTA Advair, albuterol       DVT Prophylaxis: as per orthopedic surgery service - Defer to primary service  Code Status: Full Code    Lines: PIV, without edema/erythema   Luke catheter: not indicated    Discussion: Medically, the patient appears stable.  However, erythema concerning.  Will defer to orthopedic service; please see their note.    Disposition: Anticipate discharge Saturday or Sunday.     Attestation:  I have reviewed today's vital signs, notes, medications, labs and imaging.    Alejandrina Curran PA-C      Interval History   Reports pain to knee began to increase last night.  Had a \"horrible\" night's sleep.  Feels that he is no longer able to bear weight to the knee as a result of pain.  Noted new erythema to knee, thigh and calf this morning.  Skin is tender to the touch.    He otherwise deneis fever, chills, nausea, loss of appetite, diaphoresis, chest pain, SOB, cough, abdomianl pain, dysuria, right lower extremity pain/swelling.  Passing flatus.  Voiding freely.  Expresses understanding that he will likely not go home today, if Saturday.    Physical Exam   Temp:  [97.5  F (36.4  C)-98.5  F (36.9  C)] 98.5  F (36.9  C)  Pulse:  [72] 72  Heart Rate:  [73-86] 86  Resp:  [18-20] 18  BP: (102-160)/(53-72) 160/72  SpO2:  [92 %-96 %] 93 %    Weights:   Vitals:    05/31/17 1027   Weight: 122.5 kg (270 lb)    Body mass index is 32.87 kg/(m^2).    General: alert and oriented x4, in no acute distress  CV: Regular rate/rhythm, no appreciable murmur, rub, or gallop  Respiratory: CTA bilaterally, equal chest expansion  GI: Soft, nontender, normoactive BS  Skin: Warm and dry.  Erythema to right knee, right thigh, right shin - marked 06/02 at 09:35.  Erythema is diffusely mildly tender, warm to the touch.  Incision is clean, dry and intact.  No evidence " of dehiscence.  There is an area of crepitus to the right lateral knee in area of greater erythema as compared to surrounding erythematous skin - ortho has been informed and is aware of this finding.  Musculoskeletal: Negative homans bilaterally.  Tender to palpation of right anterior calf, right anterior knee, right thigh.  Unable to bend knee given pain. Trace edema to left lower extremity.  Neuro: equal  strength    Data     Recent Labs  Lab 06/02/17  0638 06/01/17  0645 05/31/17  2220 05/31/17  1038   WBC  --   --   --  7.3   HGB 12.4* 12.7*  --  15.3   MCV  --   --   --  90   PLT  --   --   --  249   INR  --   --   --  1.04   CR 1.08  --  1.44*  --    GLC 94 119*  --   --          Recent Labs  Lab 06/02/17  0638 06/01/17  0645   GLC 94 119*        Unresulted Labs Ordered in the Past 30 Days of this Admission     No orders found from 4/1/2017 to 6/1/2017.           Imaging  No results found for this or any previous visit (from the past 24 hour(s)).     I reviewed all new labs and imaging results over the last 24 hours. I personally reviewed no images or EKG's today.    Medications     NaCl         lisinopril-hydrochlorothiazide  2 tablet Oral BID     fluticasone-salmeterol  1 puff Inhalation Q12H     omeprazole  20 mg Oral Daily     sodium chloride (PF)  3 mL Intracatheter Q8H     enoxaparin  40 mg Subcutaneous Q24H     ranitidine  150 mg Oral BID     acetaminophen  975 mg Oral Q8H     senna-docusate  1-2 tablet Oral BID       Alejandrina Curran PA-C

## 2017-06-02 NOTE — PLAN OF CARE
Problem: Goal Outcome Summary  Goal: Goal Outcome Summary  Outcome: Declining  Pt reports increased pain, swelling and rash today. Cancelled PT this am while r/o infection/DVT. Spoke with Shawnee Cloud PA-C who OK'd PT for later today. Pt reluctant to participate in PT but finally agreed to try. Rates pain at 6-7/10.  Pt transfers sit <> supine with min A of 1. Pt raises bed to max height, sit > stand independently. Amb with RW and CGA 60' with slow jolanta and antalgic gait. Performs LE exs with assist and a lot of pain behavior.     REC: Home with OP PT

## 2017-06-02 NOTE — PLAN OF CARE
Problem: Individualization  Goal: Patient Preferences  Outcome: Improving  Pt up to bathroom with sba staff & walker. Ambulates well. Up in chair most of evening. Spouse present at bedside. CMS +. Dressing CDI. Tolerating pain with 10 mg oxycodone. IV continues to infuse per MD order. Voiding without difficulty, large amounts. Tolerating PO food & fluids. VSS, afebrile. Rula Parks RN

## 2017-06-02 NOTE — CONSULTS
Care Transition Initial Assessment - RN  Reason For Consult: discharge planning   Met with: Patient and spouse.    DATA   Principal Problem:    Status post total left knee replacement  Active Problems:    Gastroesophageal reflux disease    Benign essential hypertension    Hyperlipidemia    Asthma    CAD (coronary artery disease)    RITA (acute kidney injury) (H)       Primary Care Clinic Name: [facility]  Primary Care MD Name: Dr. Lloyd  Contact information and PCP information verified: Yes      ASSESSMENT  Cognitive Status: awake, alert and oriented.        Lives With: spouse  Living Arrangements: house     Description of Support System: Supportive, Involved   Who is your support system?: Wife   Support Assessment: Adequate family and caregiver support   Insurance Concerns: No Insurance issues identified      This writer met with pt and spouse, introduced self and role.  The patient lives independently in the community.  Discussed home care, Pt was provided with Medicare certified home care list. Pt chooses to use [facility] ([phone] Fax: [phone]).  A referral was made to home care.      PLAN    Home with [facility]      Tana Gannon RN, Care Coordinator [phone]

## 2017-06-02 NOTE — PROGRESS NOTES
"SPIRITUAL HEALTH SERVICES  SPIRITUAL ASSESSMENT Progress Note  [facility] - Med Surg    Provided follow up visit for ptDavid.  He stated it had been a rough night but \"they are on it\".  He asked for prayer so we prayed together for the healing process to continue to move forward.      Gilbert Springer M.A., [facility]  Staff   [facility]  Office: [phone]  Cell: [phone]  Pager [phone]    "

## 2017-06-02 NOTE — PLAN OF CARE
Problem: Knee Replacement, Total (Adult)  Goal: Signs and Symptoms of Listed Potential Problems Will be Absent or Manageable (Knee Replacement, Total)  Signs and symptoms of listed potential problems will be absent or manageable by discharge/transition of care (reference Knee Replacement, Total (Adult) CPG).   Patient still reporting poor pain relief after atarax dose given.  Patient given 10mg oxycodone orally, ice to left knee and elevated LEs.  Redness noted to extend both above and below L knee.  ARGENIS notified.

## 2017-06-02 NOTE — PROGRESS NOTES
Patient states pain is better controlled now.   U/S completed LLE - results pending.  L knee ace wrapped and ice applied.

## 2017-06-02 NOTE — PROGRESS NOTES
"[facility] Orthopaedics Progress Note      Post-operative Day: 2 Days Post-Op    Procedure(s):  Left Total Knee Arthroplasty - Wound Class: I-Clean      Subjective:  Called to evaluate patient earlier this morning for erythema and edema to L knee. Patient states his drain \"fell out\" yesterday afternoon. He began to have worsening pain, edema and erythema around 6 pm last evening. Denies any h/o allergies to adhesives or dermatologic sensitivities.     Pain: severe  aching, dull and nagging to L knee. Worsening since last evening.  Denies shooting pain, parasthesias, numbness and weakness.   denies Chest pain, SOB, O2 required: None  denies nausea/ emesis  denies lightheadedness/ dizziness        Objective:  Blood pressure 160/57, pulse 72, temperature 98.1  F (36.7  C), temperature source Oral, resp. rate 18, height 1.93 m (6' 4\"), weight 122.5 kg (270 lb), SpO2 95 %.    Patient Vitals for the past 24 hrs:   BP Temp Temp src Pulse Heart Rate Resp SpO2   06/02/17 1104 160/57 98.1  F (36.7  C) Oral - 78 18 95 %   06/02/17 0700 160/72 98.5  F (36.9  C) Oral - 86 18 93 %   06/01/17 2326 126/72 98.3  F (36.8  C) Oral - 83 18 92 %   06/01/17 1548 139/71 97.5  F (36.4  C) Oral 72 - 18 96 %       Wt Readings from Last 4 Encounters:   05/31/17 122.5 kg (270 lb)   11/17/16 125.2 kg (276 lb 0.3 oz)     General: Alert and orientated. No apparent distress, appears uncomfortable however. Slightly diaphoretic. Non-labored breathing. Appropriate affect.   MSK: L knee: dressing Clean, dry, and intact. Perineo remains intact wihtout drainage or wound dehiscence. Erythema is evident proximal to knee joint and extends to mid thigh. Erythema extends distal to knee joint but the borders are unclear due to surgical prep remanence. An area of isolated erythema is noted a medial joint line and is approximately 3 cm in diameter. This area is darker and more noticeable. All of the erythema collectively is blanchable. ROM is limited due to " increased pain. Distal NVI.       Pertinent Labs   Lab Results: personally reviewed.     Recent Labs   Lab Test  06/02/17   0638  06/01/17   0645  05/31/17   1038   11/17/16   0815   INR   --    --   1.04   --   0.95   HGB  12.5*  12.4*  12.7*  15.3   < >  14.9   HCT  37.1*   --   45.8   --   43.7   MCV  92   --   90   --   90   PLT  189   --   249   --   236   CRP  79.6*   --    --    --    --     < > = values in this interval not displayed.       Plan:   1. S/p L TKA  Anticoagulation protocol: Lovenox inpatient and then  mg daily at discharge  x 42  days            Pain medications:  dilaudid, oxycodone, tylenol and vistaril            Weight bearing status:  WBAT            Disposition:  Home in 1-2 days             Follow up: 2 week with ARGENIS            Continue cares and rehabilitation   2. Increased edema, erythema, and pain   Likely due to hemarthrosis vs sensitivity reaction to perineo dressing and dermabond adhesive.    Started Benadryl tid for erythema and possible reaction   Applied compression ACE wrap and ice for edema   Will monitor for progression.    No aspiration of joint warranted at this time due to recent TKA   Discussed plan of care with Surgical team and Dr. Jurado is up to date    Report completed by:  Shawnee Cloud PA-C  Date: 6/2/2017  Time: 1:14 PM

## 2017-06-02 NOTE — PLAN OF CARE
"Problem: Knee Replacement, Total (Adult)  Goal: Signs and Symptoms of Listed Potential Problems Will be Absent or Manageable (Knee Replacement, Total)  Signs and symptoms of listed potential problems will be absent or manageable by discharge/transition of care (reference Knee Replacement, Total (Adult) CPG).   Outcome: Improving  /72 (BP Location: Left arm)  Pulse 72  Temp 98.3  F (36.8  C) (Oral)  Resp 18  Ht 1.93 m (6' 4\")  Wt 122.5 kg (270 lb)  SpO2 92%  BMI 32.87 kg/m2  IV fluids infusing at 125cc/hr overnight.      POD #2 Left total knee replacement: Pt awake at start of NOC shift with increased pain. Pt had already received 10mg oxycodne 1 hour prior. Pt reports oxy was helpful but he was still uncomfortable and unable to sleep. Ice applied to knee and one dose of IV dilaudid given. Pt did sleep well most of the night between voiding. Pt given oxycodone q 4 hours. Pt left knee dressing is CDI with good CMS. Pt has trace ankle edema that is equal bilaterally. Pt still has not had BM but does report passing flatus. Pt has been up to void at bedside and does move side to side in bed independently. Pt has not been up walking overnight.       "

## 2017-06-02 NOTE — PLAN OF CARE
"Problem: Goal Outcome Summary  Goal: Goal Outcome Summary  Outcome: Improving  Patient ambulated halls with stand by assist and walker. Ice to left knee. Dressing is clean/dry/intact. No drainage noted. CMS intact with good pedal pulses. Left leg is warmer than right, but improving. Redness within drawn outline, and decreasing in redness/swelling/warmth. Temp 101.1, at 1630, so scheduled tylenol administered. VS stable otherwise, clear lung sounds, equal bilaterally.  /76 (BP Location: Left arm)  Pulse 72  Temp 101.1  F (38.4  C) (Oral)  Resp 20  Ht 1.93 m (6' 4\")  Wt 122.5 kg (270 lb)  SpO2 96%  BMI 32.87 kg/m2          "

## 2017-06-02 NOTE — PROGRESS NOTES
Voiding large amounts.  Patient able to sleep this afternoon and pain better controlled than this morning.  LLE redness within marked area.

## 2017-06-03 ENCOUNTER — APPOINTMENT (OUTPATIENT)
Dept: PHYSICAL THERAPY | Facility: CLINIC | Age: 67
DRG: 470 | End: 2017-06-03
Attending: ORTHOPAEDIC SURGERY
Payer: MEDICARE

## 2017-06-03 ENCOUNTER — APPOINTMENT (OUTPATIENT)
Dept: OCCUPATIONAL THERAPY | Facility: CLINIC | Age: 67
DRG: 470 | End: 2017-06-03
Attending: ORTHOPAEDIC SURGERY
Payer: MEDICARE

## 2017-06-03 LAB
ANION GAP SERPL CALCULATED.3IONS-SCNC: 9 MMOL/L (ref 3–14)
BUN SERPL-MCNC: 15 MG/DL (ref 7–30)
CALCIUM SERPL-MCNC: 8.7 MG/DL (ref 8.5–10.1)
CHLORIDE SERPL-SCNC: 105 MMOL/L (ref 94–109)
CO2 SERPL-SCNC: 24 MMOL/L (ref 20–32)
CREAT SERPL-MCNC: 1.01 MG/DL (ref 0.66–1.25)
ERYTHROCYTE [DISTWIDTH] IN BLOOD BY AUTOMATED COUNT: 13.1 % (ref 10–15)
GFR SERPL CREATININE-BSD FRML MDRD: 73 ML/MIN/1.7M2
GLUCOSE SERPL-MCNC: 115 MG/DL (ref 70–99)
HCT VFR BLD AUTO: 38.3 % (ref 40–53)
HGB BLD-MCNC: 12.7 G/DL (ref 13.3–17.7)
MCH RBC QN AUTO: 30.5 PG (ref 26.5–33)
MCHC RBC AUTO-ENTMCNC: 33.2 G/DL (ref 31.5–36.5)
MCV RBC AUTO: 92 FL (ref 78–100)
PLATELET # BLD AUTO: 212 10E9/L (ref 150–450)
POTASSIUM SERPL-SCNC: 3.9 MMOL/L (ref 3.4–5.3)
PROCALCITONIN SERPL-MCNC: 0.51 NG/ML
RBC # BLD AUTO: 4.16 10E12/L (ref 4.4–5.9)
SODIUM SERPL-SCNC: 138 MMOL/L (ref 133–144)
WBC # BLD AUTO: 9 10E9/L (ref 4–11)

## 2017-06-03 PROCEDURE — 36415 COLL VENOUS BLD VENIPUNCTURE: CPT | Performed by: FAMILY MEDICINE

## 2017-06-03 PROCEDURE — 25000128 H RX IP 250 OP 636: Performed by: PHYSICIAN ASSISTANT

## 2017-06-03 PROCEDURE — 99233 SBSQ HOSP IP/OBS HIGH 50: CPT | Performed by: FAMILY MEDICINE

## 2017-06-03 PROCEDURE — 97535 SELF CARE MNGMENT TRAINING: CPT | Mod: GO | Performed by: OCCUPATIONAL THERAPIST

## 2017-06-03 PROCEDURE — 84145 PROCALCITONIN (PCT): CPT | Performed by: FAMILY MEDICINE

## 2017-06-03 PROCEDURE — 40000193 ZZH STATISTIC PT WARD VISIT

## 2017-06-03 PROCEDURE — 25000128 H RX IP 250 OP 636: Performed by: ORTHOPAEDIC SURGERY

## 2017-06-03 PROCEDURE — 25000132 ZZH RX MED GY IP 250 OP 250 PS 637: Mod: GY | Performed by: PHYSICIAN ASSISTANT

## 2017-06-03 PROCEDURE — 12000000 ZZH R&B MED SURG/OB

## 2017-06-03 PROCEDURE — 40000133 ZZH STATISTIC OT WARD VISIT: Performed by: OCCUPATIONAL THERAPIST

## 2017-06-03 PROCEDURE — 85027 COMPLETE CBC AUTOMATED: CPT | Performed by: FAMILY MEDICINE

## 2017-06-03 PROCEDURE — 97116 GAIT TRAINING THERAPY: CPT | Mod: GP

## 2017-06-03 PROCEDURE — A9270 NON-COVERED ITEM OR SERVICE: HCPCS | Mod: GY | Performed by: ORTHOPAEDIC SURGERY

## 2017-06-03 PROCEDURE — 97110 THERAPEUTIC EXERCISES: CPT | Mod: GP

## 2017-06-03 PROCEDURE — 25000132 ZZH RX MED GY IP 250 OP 250 PS 637: Mod: GY | Performed by: ORTHOPAEDIC SURGERY

## 2017-06-03 PROCEDURE — A9270 NON-COVERED ITEM OR SERVICE: HCPCS | Mod: GY | Performed by: PHYSICIAN ASSISTANT

## 2017-06-03 PROCEDURE — 80048 BASIC METABOLIC PNL TOTAL CA: CPT | Performed by: FAMILY MEDICINE

## 2017-06-03 RX ADMIN — OMEPRAZOLE 20 MG: 20 CAPSULE, DELAYED RELEASE ORAL at 08:56

## 2017-06-03 RX ADMIN — RANITIDINE HYDROCHLORIDE 150 MG: 150 TABLET, FILM COATED ORAL at 08:57

## 2017-06-03 RX ADMIN — SENNOSIDES AND DOCUSATE SODIUM 2 TABLET: 8.6; 5 TABLET ORAL at 19:20

## 2017-06-03 RX ADMIN — OXYCODONE HYDROCHLORIDE 10 MG: 5 TABLET ORAL at 23:34

## 2017-06-03 RX ADMIN — LISINOPRIL AND HYDROCHLOROTHIAZIDE 2 TABLET: 12.5; 1 TABLET ORAL at 08:56

## 2017-06-03 RX ADMIN — SENNOSIDES AND DOCUSATE SODIUM 2 TABLET: 8.6; 5 TABLET ORAL at 08:56

## 2017-06-03 RX ADMIN — ACETAMINOPHEN 975 MG: 325 TABLET, FILM COATED ORAL at 08:57

## 2017-06-03 RX ADMIN — DIPHENHYDRAMINE HYDROCHLORIDE 25 MG: 25 CAPSULE ORAL at 08:55

## 2017-06-03 RX ADMIN — ACETAMINOPHEN 650 MG: 325 TABLET, FILM COATED ORAL at 23:03

## 2017-06-03 RX ADMIN — OXYCODONE HYDROCHLORIDE 10 MG: 5 TABLET ORAL at 05:43

## 2017-06-03 RX ADMIN — OXYCODONE HYDROCHLORIDE 10 MG: 5 TABLET ORAL at 10:45

## 2017-06-03 RX ADMIN — DIPHENHYDRAMINE HYDROCHLORIDE 25 MG: 25 CAPSULE ORAL at 19:25

## 2017-06-03 RX ADMIN — DIPHENHYDRAMINE HYDROCHLORIDE 25 MG: 25 CAPSULE ORAL at 14:48

## 2017-06-03 RX ADMIN — ACETAMINOPHEN 975 MG: 325 TABLET, FILM COATED ORAL at 01:09

## 2017-06-03 RX ADMIN — RANITIDINE HYDROCHLORIDE 150 MG: 150 TABLET, FILM COATED ORAL at 19:20

## 2017-06-03 RX ADMIN — ENOXAPARIN SODIUM 40 MG: 40 INJECTION SUBCUTANEOUS at 14:48

## 2017-06-03 RX ADMIN — HYDROXYZINE HYDROCHLORIDE 10 MG: 10 TABLET ORAL at 15:39

## 2017-06-03 RX ADMIN — DIPHENHYDRAMINE HYDROCHLORIDE 25 MG: 25 CAPSULE ORAL at 01:09

## 2017-06-03 RX ADMIN — ACETAMINOPHEN 650 MG: 325 TABLET, FILM COATED ORAL at 15:45

## 2017-06-03 RX ADMIN — SODIUM CHLORIDE: 9 INJECTION, SOLUTION INTRAVENOUS at 14:52

## 2017-06-03 RX ADMIN — OXYCODONE HYDROCHLORIDE 10 MG: 5 TABLET ORAL at 15:39

## 2017-06-03 RX ADMIN — OXYCODONE HYDROCHLORIDE 10 MG: 5 TABLET ORAL at 19:25

## 2017-06-03 RX ADMIN — LISINOPRIL AND HYDROCHLOROTHIAZIDE 2 TABLET: 12.5; 1 TABLET ORAL at 19:20

## 2017-06-03 NOTE — PLAN OF CARE
Problem: Knee Replacement, Total (Adult)  Goal: Signs and Symptoms of Listed Potential Problems Will be Absent or Manageable (Knee Replacement, Total)  Signs and symptoms of listed potential problems will be absent or manageable by discharge/transition of care (reference Knee Replacement, Total (Adult) CPG).   Outcome: No Change  T-100.2. Received scheduled Tylenol and Benadryl.   LLE has increased pinkness and is warm to the touch, pinkness within markings. Pt voices that it looks and pain is much better than earlier today.   Ice applied to knee.   Ace wrap is CDI.   +pedal pulse.   LE is warm with report of full sensation and good movement.

## 2017-06-03 NOTE — PLAN OF CARE
Problem: Goal Outcome Summary  Goal: Goal Outcome Summary  Outcome: Completed Date Met:  06/03/17  OT:  OT completed this episode of care.  Appropriate for D/C.           Comments:   REC:  Return to home with wife for assistance.  No further OT skilled needs.  Wife will plan to purchase raised toilet seat and potentially extended tub bench

## 2017-06-03 NOTE — PLAN OF CARE
Problem: OT General Care Plan  Goal: OT Goal 1  OT Goal 1   Outcome: Completed Date Met:  06/03/17  Lengthy education and discussion for bathing situation at his house.  Demonstration provided with extended bench.   Wife and pt will decide if they want to purchase.  Upon discharge the plan for them will be to sponge bath, then potentially use the stairs to go the walk in shower.  Discussed bath mat to decrease risk of slipping.

## 2017-06-03 NOTE — PLAN OF CARE
"Problem: Goal Outcome Summary  Goal: Goal Outcome Summary  Outcome: Improving  Patient denying any numbness or tingling. Left leg/knee slightly warmer and edematous than right leg. Ice on for pain management. PRN oxycodone and atarax administered. Lung sounds clear, temp slightly elevated, so PRN tylenol administered. IV infusing per MD order. /73 (BP Location: Left arm)  Pulse 80  Temp 100.4  F (38  C) (Oral)  Resp 16  Ht 1.93 m (6' 4\")  Wt 122.5 kg (270 lb)  SpO2 94%  BMI 32.87 kg/m2        Recheck of temp at 1810 was 99.4 F oral  "

## 2017-06-03 NOTE — PLAN OF CARE
Problem: Goal Outcome Summary  Goal: Goal Outcome Summary  Outcome: Therapy, progress toward functional goals is gradual  Pt transfers:   Independent with bed mobility, transfers supine-sit, sit-stand to RW  Good quad activation for HEP, can perform ex independently, but has increased pain.  Pain in knee is limiting factor right now. Good mobility

## 2017-06-03 NOTE — PLAN OF CARE
Problem: OT General Care Plan  Goal: Toilet Transfer/Toileting (OT)  Toilet Transfer/Toileting (OT)   Toilet transfer education for raised seat as he can not do a lower seat, even with grab bars due to his height.  He demonstrates the ability to slide his left foot.  Discussed with patient wife with given written information for raised toilet seat with bars and lock on.  Both are understanding.  Goal met.

## 2017-06-03 NOTE — PROGRESS NOTES
"[facility] Orthopedic Progress Note         Assessment and Plan:    Assessment:   Admission date:   5/31/2017 for ARTHROPLASTY KNEE      Large hemarthrosis of left knee.  No evidence of active bleeding, but drain was prematurely pulled. Erythema responding to benadryl, although the patient still has quite a bit of pain.  This does not appear to be infectious, but inflammatory.       Plan:   Weight bearing as tolerated  Deep venous thrombosis prophylaxis - Lovenox IP, ASA upon D/C  Physical therapy  Pain control measures  Discharge plan: Possiblly tomorrow, but more likely Monday if swelling and pain improve.  Additional problems to be followed by medicine physician           Interval History:   Stable.  Doing well.  Improving slowly.  Pain is reasonably controlled.  No fevers. \"Angry\" redness which was present is much better on diphenhydromine, but there is still quite a bit of pain.            Significant Problems:   (Refer to attending physician notes regarding additional medical problems and issues)          Review of Systems:   The patient denies any chest pain, shortness of breath, excessive pain, fever, chills, purulent drainage from the wound, nausea or vomiting.          Medications:     Current Facility-Administered Medications   Medication     lisinopril-hydrochlorothiazide (PRINZIDE/ZESTORETIC) 10-12.5 MG per tablet 2 tablet     0.9% sodium chloride infusion     diphenhydrAMINE (BENADRYL) capsule 25 mg     fluticasone-salmeterol (ADVAIR) 250-50 MCG/DOSE diskus inhaler 1 puff     labetalol (NORMODYNE/TRANDATE) injection 20 mg     albuterol neb solution 2.5 mg     omeprazole (priLOSEC) CR capsule 20 mg     lidocaine 1 % 1 mL     lidocaine (LMX4) kit     sodium chloride (PF) 0.9% PF flush 3 mL     sodium chloride (PF) 0.9% PF flush 3 mL     benzocaine-menthol (CEPACOL) 15-3.6 MG lozenge 1-2 lozenge     naloxone (NARCAN) injection 0.1-0.4 mg     enoxaparin (LOVENOX) injection 40 mg     ranitidine " "(ZANTAC) tablet 150 mg     HYDROmorphone (PF) (DILAUDID) injection 0.3-0.5 mg     acetaminophen (TYLENOL) tablet 650 mg     oxyCODONE (ROXICODONE) IR tablet 5-10 mg     hydrOXYzine (ATARAX) tablet 10 mg     ondansetron (ZOFRAN-ODT) ODT tab 4 mg    Or     ondansetron (ZOFRAN) injection 4 mg     prochlorperazine (COMPAZINE) injection 5 mg    Or     prochlorperazine (COMPAZINE) tablet 5 mg     senna-docusate (SENOKOT-S;PERICOLACE) 8.6-50 MG per tablet 1-2 tablet     zolpidem (AMBIEN) half-tab 2.5 mg     albuterol (PROAIR HFA/PROVENTIL HFA/VENTOLIN HFA) Inhaler 2 puff             Physical Exam:   Blood pressure 140/76, pulse 80, temperature 98.1  F (36.7  C), temperature source Oral, resp. rate 18, height 6' 4\" (1.93 m), weight 270 lb (122.5 kg), SpO2 92 %.      Left lower extremity    Incision:  clean, intact.  Palpable hemarthrosis of left knee, but no skin compromise.  Erythema over the anterior thigh and lower leg is much improved, but a light erythema is still present.    Lower Extremity Motor :   intact  Lower Extremity Sensory:  intact    Pulses: intact     Homans:  Negative  Calf tenderness:  None    Abnormal Exam findings:  See above.  U/S for DVT was negative.          Data:     Hemoglobin   Date Value Ref Range Status   06/03/2017 12.7 (L) 13.3 - 17.7 g/dL Final   06/02/2017 12.4 (L) 13.3 - 17.7 g/dL Final   06/02/2017 12.5 (L) 13.3 - 17.7 g/dL Final   06/01/2017 12.7 (L) 13.3 - 17.7 g/dL Final   05/31/2017 15.3 13.3 - 17.7 g/dL Final     INR   Date Value Ref Range Status   05/31/2017 1.04 0.86 - 1.14 Final   11/17/2016 0.95 0.86 - 1.14 Final             Bunny Travis MD  "

## 2017-06-03 NOTE — PROGRESS NOTES
"SUBJECTIVE:   Pain slightly better in the knee  Able to walk a short distance, which he couldn't do yesterday.   T 101 last pati     ROS:4 point ROS including Respiratory, CV, GI and , other than that noted in the HPI,  is negative     OBJECTIVE:   /76 (BP Location: Left arm)  Pulse 80  Temp 98.4  F (36.9  C)  Resp 18  Ht 1.93 m (6' 4\")  Wt 122.5 kg (270 lb)  SpO2 92%  BMI 32.87 kg/m2    GENERAL APPEARANCE:  Alert, NAD, Ox3     RESP:clear      CV: regular rates and rhythm,no murmur, no click or rub - no edema     Abdomen: soft, nontender, no liver or spleen enlargement, no masses, BSs normal   Skin: no cyanosis, pallor, or jaundice   The erythema is less intense, same area, still some tenderness over the area of redness up and down from the knee    CMP  Recent Labs  Lab 06/03/17  0905 06/02/17  0638 06/01/17  0645 05/31/17  2220     --   --   --    POTASSIUM 3.9  --   --   --    CHLORIDE 105  --   --   --    CO2 24  --   --   --    ANIONGAP 9  --   --   --    * 94 119*  --    BUN 15  --   --   --    CR 1.01 1.08  --  1.44*   GFRESTIMATED 73 68  --  49*   GFRESTBLACK 89 82  --  59*   ALEXANDER 8.7  --   --   --      CBC  Recent Labs  Lab 06/03/17  0905 06/02/17 0638 06/01/17  0645 05/31/17  1038   WBC 9.0 9.2  --  7.3   RBC 4.16* 4.03*  --  5.08   HGB 12.7* 12.5*  12.4* 12.7* 15.3   HCT 38.3* 37.1*  --  45.8   MCV 92 92  --  90   MCH 30.5 31.0  --  30.1   MCHC 33.2 33.7  --  33.4   RDW 13.1 13.3  --  13.2    189  --  249     INR  Recent Labs  Lab 05/31/17  1038   INR 1.04     Arterial BloodGas  No lab results found in last 7 days.   Venous Blood Gas  No lab results found in last 7 days.    Medications     lisinopril-hydrochlorothiazide  2 tablet Oral BID     diphenhydrAMINE  25 mg Oral Q6H     fluticasone-salmeterol  1 puff Inhalation Q12H     omeprazole  20 mg Oral Daily     sodium chloride (PF)  3 mL Intracatheter Q8H     enoxaparin  40 mg Subcutaneous Q24H     ranitidine  150 mg Oral " BID     senna-docusate  1-2 tablet Oral BID       Intake/Output Summary (Last 24 hours) at 06/03/17 1528  Last data filed at 06/03/17 1452   Gross per 24 hour   Intake             2289 ml   Output             2200 ml   Net               89 ml         ASSESSMENT: PLAN:   Jorge Alberto Frye is a 66 year old male who presented on 5/31/2017 for scheduled Procedure(s):  ARTHROPLASTY KNEE by Jorge Alberto Jurado MD and is being followed by the hospital medicine service for co-management of acute and/or chronic perioperative medical problems.     S/p Procedure(s):  ARTHROPLASTY KNEE  - POD #3  - pain control, wound cares, physical therapy, occupational therapy and DVT prophylaxis per orthopedic surgery service     Increasing Knee Pain  New, diffuse erythema  Suspect hemarthrosis  New erythema noted this morning both above and below the knee.  New area of crepitus to lateral knee.  Erythema marked 06/02 at 09:35.  Exquisitely tender to the touch.  Unable to bend knee. Temp 101 last pati  However, knee appears to be gettting better without antibiotics.  Still doubt infection.  Follow for now.        RITA (acute kidney injury) (H)  Unclear etiology.  Baseline creatinine 0.9 - 1.2.  On POD#1, creatinine 1.44.  POD #2, 1.08.  Fluids continued given above unclear erythema  - d/c PRN 20mg IV labetalol given resumption of home medication  - continue IVF today at 50cc/hr given above erythema  -no labs done today, will recheck in AM,      CAD (coronary artery disease)  Questionable MI while in [facility] 03/2017 - upon interview, labs obtained (WBC was 10.5.  Troponin-T was 23.3 [range 0-14.0]).  Patient had both an EKG and ECHO, but does not know the results of these tests and left the hospital AMA.  On daily aspirin previous to surgery.  Informed PCP during pre-op.    - recommend further discussion with PCP as an outpatient in regards to event, possible cardiology consultation.   - will likely benefit from stress testing (reports he had one  "\"many\" years ago     Gastroesophageal reflux disease  - continue PTA Prilosec      Benign essential hypertension  BP reviewed, 130/70  - add back home HCTZ/lisinopril today  - continue to hold home spironolactone; consider resuming tomorrow      Hyperlipidemia  Not using medication.      Asthma  - continue PTA Advair, albuterol        DVT Prophylaxis: as per orthopedic surgery service - Defer to primary service  Code Status: Full Code     Lines: PIV, without edema/erythema   Luke catheter: not indicated     Discussion: Medically, the patient appears stable.  However, erythema concerning.  Will defer to orthopedic service; please see their note.     Disposition: Anticipate discharge Saturday or Sunday.      "

## 2017-06-04 ENCOUNTER — APPOINTMENT (OUTPATIENT)
Dept: PHYSICAL THERAPY | Facility: CLINIC | Age: 67
DRG: 470 | End: 2017-06-04
Attending: ORTHOPAEDIC SURGERY
Payer: MEDICARE

## 2017-06-04 ENCOUNTER — APPOINTMENT (OUTPATIENT)
Dept: ULTRASOUND IMAGING | Facility: CLINIC | Age: 67
DRG: 470 | End: 2017-06-04
Attending: PHYSICIAN ASSISTANT
Payer: MEDICARE

## 2017-06-04 LAB — CRP SERPL-MCNC: 116 MG/L (ref 0–8)

## 2017-06-04 PROCEDURE — 25000132 ZZH RX MED GY IP 250 OP 250 PS 637: Mod: GY | Performed by: PHYSICIAN ASSISTANT

## 2017-06-04 PROCEDURE — 36415 COLL VENOUS BLD VENIPUNCTURE: CPT | Performed by: PHYSICIAN ASSISTANT

## 2017-06-04 PROCEDURE — 87040 BLOOD CULTURE FOR BACTERIA: CPT | Performed by: PHYSICIAN ASSISTANT

## 2017-06-04 PROCEDURE — 93971 EXTREMITY STUDY: CPT | Mod: LT

## 2017-06-04 PROCEDURE — 99232 SBSQ HOSP IP/OBS MODERATE 35: CPT | Performed by: FAMILY MEDICINE

## 2017-06-04 PROCEDURE — 97110 THERAPEUTIC EXERCISES: CPT | Mod: GP | Performed by: PHYSICAL THERAPIST

## 2017-06-04 PROCEDURE — 25000132 ZZH RX MED GY IP 250 OP 250 PS 637: Mod: GY | Performed by: ORTHOPAEDIC SURGERY

## 2017-06-04 PROCEDURE — 97116 GAIT TRAINING THERAPY: CPT | Mod: GP | Performed by: PHYSICAL THERAPIST

## 2017-06-04 PROCEDURE — A9270 NON-COVERED ITEM OR SERVICE: HCPCS | Mod: GY | Performed by: PHYSICIAN ASSISTANT

## 2017-06-04 PROCEDURE — 40000193 ZZH STATISTIC PT WARD VISIT: Performed by: PHYSICAL THERAPIST

## 2017-06-04 PROCEDURE — 25000128 H RX IP 250 OP 636: Performed by: ORTHOPAEDIC SURGERY

## 2017-06-04 PROCEDURE — 12000000 ZZH R&B MED SURG/OB

## 2017-06-04 PROCEDURE — A9270 NON-COVERED ITEM OR SERVICE: HCPCS | Mod: GY | Performed by: ORTHOPAEDIC SURGERY

## 2017-06-04 PROCEDURE — 86140 C-REACTIVE PROTEIN: CPT | Performed by: PHYSICIAN ASSISTANT

## 2017-06-04 RX ORDER — POLYETHYLENE GLYCOL 3350 17 G/17G
17 POWDER, FOR SOLUTION ORAL DAILY
Status: DISCONTINUED | OUTPATIENT
Start: 2017-06-04 | End: 2017-06-05 | Stop reason: HOSPADM

## 2017-06-04 RX ORDER — BISACODYL 10 MG
10 SUPPOSITORY, RECTAL RECTAL DAILY PRN
Status: DISCONTINUED | OUTPATIENT
Start: 2017-06-04 | End: 2017-06-05 | Stop reason: HOSPADM

## 2017-06-04 RX ADMIN — OXYCODONE HYDROCHLORIDE 10 MG: 5 TABLET ORAL at 07:11

## 2017-06-04 RX ADMIN — SENNOSIDES AND DOCUSATE SODIUM 2 TABLET: 8.6; 5 TABLET ORAL at 19:29

## 2017-06-04 RX ADMIN — DIPHENHYDRAMINE HYDROCHLORIDE 25 MG: 25 CAPSULE ORAL at 08:11

## 2017-06-04 RX ADMIN — ACETAMINOPHEN 650 MG: 325 TABLET, FILM COATED ORAL at 23:32

## 2017-06-04 RX ADMIN — OXYCODONE HYDROCHLORIDE 10 MG: 5 TABLET ORAL at 19:29

## 2017-06-04 RX ADMIN — LISINOPRIL AND HYDROCHLOROTHIAZIDE 2 TABLET: 12.5; 1 TABLET ORAL at 19:29

## 2017-06-04 RX ADMIN — DIPHENHYDRAMINE HYDROCHLORIDE 25 MG: 25 CAPSULE ORAL at 19:29

## 2017-06-04 RX ADMIN — OMEPRAZOLE 20 MG: 20 CAPSULE, DELAYED RELEASE ORAL at 08:11

## 2017-06-04 RX ADMIN — ENOXAPARIN SODIUM 40 MG: 40 INJECTION SUBCUTANEOUS at 13:03

## 2017-06-04 RX ADMIN — LISINOPRIL AND HYDROCHLOROTHIAZIDE 2 TABLET: 12.5; 1 TABLET ORAL at 08:11

## 2017-06-04 RX ADMIN — OXYCODONE HYDROCHLORIDE 10 MG: 5 TABLET ORAL at 11:38

## 2017-06-04 RX ADMIN — SENNOSIDES AND DOCUSATE SODIUM 2 TABLET: 8.6; 5 TABLET ORAL at 08:11

## 2017-06-04 RX ADMIN — OXYCODONE HYDROCHLORIDE 10 MG: 5 TABLET ORAL at 15:41

## 2017-06-04 RX ADMIN — DIPHENHYDRAMINE HYDROCHLORIDE 25 MG: 25 CAPSULE ORAL at 01:48

## 2017-06-04 RX ADMIN — RANITIDINE HYDROCHLORIDE 150 MG: 150 TABLET, FILM COATED ORAL at 19:29

## 2017-06-04 RX ADMIN — POLYETHYLENE GLYCOL 3350 17 G: 17 POWDER, FOR SOLUTION ORAL at 11:43

## 2017-06-04 RX ADMIN — OXYCODONE HYDROCHLORIDE 10 MG: 5 TABLET ORAL at 23:28

## 2017-06-04 RX ADMIN — DIPHENHYDRAMINE HYDROCHLORIDE 25 MG: 25 CAPSULE ORAL at 13:03

## 2017-06-04 RX ADMIN — HYDROXYZINE HYDROCHLORIDE 10 MG: 10 TABLET ORAL at 11:38

## 2017-06-04 RX ADMIN — RANITIDINE HYDROCHLORIDE 150 MG: 150 TABLET, FILM COATED ORAL at 08:11

## 2017-06-04 RX ADMIN — OXYCODONE HYDROCHLORIDE 10 MG: 5 TABLET ORAL at 03:34

## 2017-06-04 ASSESSMENT — PAIN DESCRIPTION - DESCRIPTORS: DESCRIPTORS: ACHING

## 2017-06-04 NOTE — PROGRESS NOTES
06/04/17 1600   Signing Clinician's Name / Credentials   Signing clinician's name / credentials Dina Allen PT, DPT SILVIA MELTON/T   Quick Adds   Rehab Discipline PT   Gait Training   Minutes of Treatment (Gait Training) 15   Symptoms Noted During/After Treatment (Gait Training) fatigue;increased pain   Treatment Detail Left lower extermity conintues to be red, warm  and tender.  Pt reports leg is somewhat improved.  Pt verbalized frusration and signficant increased pain with WB and activitiies   [place] Level (Gait Training) stand-by assist   Physical Assistance Level (Gait Training) set-up required   Weight Bearing (Gait Training) weight-bearing as tolerated   Assistive Device (Gait Training) rolling walker   Gait Distance 75 ft   Pattern Analysis (Gait Training) swing-to gait   Gait Analysis Deviations increased time in double stance;decreased velocity of limb motion;decreased swing-to-stance ratio;decreased toe-to-floor clearance;decreased weight-shifting ability   Impairments (Gait Analysis/Training) pain;ROM decreased;strength decreased   Stair, Performance   Symptoms Noted During/After Treatment increased pain;fatigue   Treatment Detail Reviwed and instructed to make sure he clears his heel as he steps down with bad.     Number of Stairs 6   Stair Railings present at both sides   Physical Assist/Nonphysical Assist supervision   Level of [place] contact guard   Stairs, Impairments pain;ROM decreased;strength decreased   Therapeutic Exercise   Minutes of Treatment 13   Symptoms Noted During/After Treatment increased pain   Treatment Detail supine AP, QS, heel slides, SAQ, SLR X 8 ea, flexion stretch to 75 degrees, tight into extension. stopped due to increased pain. Pateint recieved education why keeping his leg slightly bent felt better and when he straightened it more pain due to compression.  Pt still frustrated.   Additional Documentation   Rehab Comments Anxiety and angry during sessions.    "  PT Plan Patient more approriate for one time per day until his pain is better managed, anxiety is less.   [facility] AM-PAC  \"6 Clicks\" V.2 Basic Mobility Inpatient Short Form   1. Turning from your back to your side while in a flat bed without using bedrails? 4 - None   2. Moving from lying on your back to sitting on the side of a flat bed without using bedrails? 4 - None   3. Moving to and from a bed to a chair (including a wheelchair)? 3 - A Little   4. Standing up from a chair using your arms (e.g., wheelchair, or bedside chair)? 3 - A Little   5. To walk in hospital room? 3 - A Little   6. Climbing 3-5 steps with a railing? 3 - A Little   Basic Mobility Raw Score (Score out of 24.Lower scores equate to lower levels of function) 20   Total Session Time   Total Session Time (minutes) 28 minutes     "

## 2017-06-04 NOTE — PLAN OF CARE
"Problem: Goal Outcome Summary  Goal: Goal Outcome Summary  PT:  Pt improving in all aspects with performance and goals are being met.  Pt verbalizes and demonstrates frustration about his pain and what is \"being done or addressed:.  Anxious and perfuse sweating during session.  Decreased to one session of PT today due to level of pain, pt progressing and his anxiety and level of frustration.  Jennifer his RN in agreement and verbalized to care team at rounds.  No one at rounds appeared to disagree.    Comments:   Recommendation:  Home with assistance and out pt at discharge.  "

## 2017-06-04 NOTE — PROGRESS NOTES
[facility]    Hospital Medicine   Care Update  Date of Service: 6/3/2017     I was called due to fever to 100.9. Unlikely secondary to infection, but left leg continues to look erythematous, edematous.     Assessment: fever of unknown origin.  Most likely multifactorial in that patient has known allergic reaction and has known hemarthrosis to surgical knee.  Again, doubt infection, but given persistence of fever post operatively, can not entirely rule out at this point.    Plan:   - STAT blood cultures  - instruct RN, give tylenol      Alejandrina Curran PA-C

## 2017-06-04 NOTE — PROGRESS NOTES
"SUBJECTIVE:   Still a lot of pain with attempts at walking.   Tmax 100.9 last pati, better the rest of the day.        ROS:4 point ROS including Respiratory, CV, GI and , other than that noted in the HPI,  is negative     OBJECTIVE:   /75 (BP Location: Left arm)  Pulse 81  Temp 98.4  F (36.9  C) (Oral)  Resp 18  Ht 1.93 m (6' 4\")  Wt 122.5 kg (270 lb)  SpO2 96%  BMI 32.87 kg/m2    GENERAL APPEARANCE:  Alert, NAD, Ox3     RESP:clear      CV: regular rates and rhythm,no murmur, no click or rub - no edema     Abdomen: soft, nontender, no liver or spleen enlargement, no masses, BSs normal   Skin: no cyanosis, pallor, or jaundice   The erythema is less intense, same area, still some tenderness over the area of redness up and down from the knee    CMP    Recent Labs  Lab 06/03/17  0905 06/02/17  0638 06/01/17  0645 05/31/17  2220     --   --   --    POTASSIUM 3.9  --   --   --    CHLORIDE 105  --   --   --    CO2 24  --   --   --    ANIONGAP 9  --   --   --    * 94 119*  --    BUN 15  --   --   --    CR 1.01 1.08  --  1.44*   GFRESTIMATED 73 68  --  49*   GFRESTBLACK 89 82  --  59*   ALEXANDER 8.7  --   --   --      CBC    Recent Labs  Lab 06/03/17  0905 06/02/17 0638 06/01/17 0645 05/31/17  1038   WBC 9.0 9.2  --  7.3   RBC 4.16* 4.03*  --  5.08   HGB 12.7* 12.5*  12.4* 12.7* 15.3   HCT 38.3* 37.1*  --  45.8   MCV 92 92  --  90   MCH 30.5 31.0  --  30.1   MCHC 33.2 33.7  --  33.4   RDW 13.1 13.3  --  13.2    189  --  249     INR    Recent Labs  Lab 05/31/17  1038   INR 1.04     Arterial BloodGas  No lab results found in last 7 days.   Venous Blood Gas  No lab results found in last 7 days.    Medications     polyethylene glycol  17 g Oral Daily     lisinopril-hydrochlorothiazide  2 tablet Oral BID     diphenhydrAMINE  25 mg Oral Q6H     fluticasone-salmeterol  1 puff Inhalation Q12H     omeprazole  20 mg Oral Daily     enoxaparin  40 mg Subcutaneous Q24H     ranitidine  150 mg Oral BID     " "senna-docusate  1-2 tablet Oral BID       Intake/Output Summary (Last 24 hours) at 06/03/17 1528  Last data filed at 06/03/17 1452   Gross per 24 hour   Intake             2289 ml   Output             2200 ml   Net               89 ml         ASSESSMENT: PLAN:   Jorge Alberto Frye is a 66 year old male who presented on 5/31/2017 for scheduled Procedure(s):  ARTHROPLASTY KNEE by Jorge Alberto Jurado MD and is being followed by the hospital medicine service for co-management of acute and/or chronic perioperative medical problems.     S/p Procedure(s):  ARTHROPLASTY KNEE  - POD #3  - pain control, wound cares, physical therapy, occupational therapy and DVT prophylaxis per orthopedic surgery service     Increasing Knee Pain  New, diffuse erythema  Suspect hemarthrosis  New erythema noted this morning both above and below the knee.  New area of crepitus to lateral knee.  Erythema marked 06/02 at 09:35.  Exquisitely tender to the touch.  Unable to bend knee. Temp 101 last pati  However, knee appears to be gettting better without antibiotics.  Still doubt infection.  Follow for now.  -doppler negative.  CRP up to 116 but procal low.  Will continue to follow.          RITA (acute kidney injury) (H)  Unclear etiology.  Baseline creatinine 0.9 - 1.2.  On POD#1, creatinine 1.44.  POD #2, 1.08.  Fluids continued initially given above unclear erythema  --resolved.      CAD (coronary artery disease)  Questionable MI while in [facility] 03/2017 - upon interview, labs obtained (WBC was 10.5.  Troponin-T was 23.3 [range 0-14.0]).  Patient had both an EKG and ECHO, but does not know the results of these tests and left the hospital AMA.  On daily aspirin previous to surgery.  Informed PCP during pre-op.    - recommend further discussion with PCP as an outpatient in regards to event, possible cardiology consultation.   - will likely benefit from stress testing (reports he had one \"many\" years ago     Gastroesophageal reflux disease  - continue " PTA Prilosec      Benign essential hypertension  BP reviewed, 130/70  - add back home HCTZ/lisinopril today  - continue to hold home spironolactone; consider resuming tomorrow      Hyperlipidemia  Not using medication.      Asthma  - continue PTA Advair, albuterol        DVT Prophylaxis: as per orthopedic surgery service - Code Status: Full Code     Lines: PIV, without edema/erythema   Luke catheter: not indicated     Discussion: still some erythema about the knee and low grade fevers, but ortho opinion is still not infection.  Would have expected infection to be much worse by now without any antibiotics.

## 2017-06-04 NOTE — PLAN OF CARE
"Problem: Goal Outcome Summary  Goal: Goal Outcome Summary  Outcome: Improving  Pt has been up with assist of 1 with walker. Swelling noted in left leg/knee. Still some warmness and pink to upper tight and lower leg. US completed again today that showed no DVT. Ortho believes that this is more of an inflammation problem rather than an infection. Blood cultures are pending. Oxycodone 10 mg given every 4 hours- last at 1130, also Atarax given at this time. IV went bad this morning and per Ortho okay to leave out. Plan is for discharge tomorrow. No BM yet, is passing flatus. 2 Senna tabs and Miralax given today. Lungs are clear, afebrile. /75 (BP Location: Left arm)  Pulse 81  Temp 98.4  F (36.9  C) (Oral)  Resp 18  Ht 1.93 m (6' 4\")  Wt 122.5 kg (270 lb)  SpO2 96%  BMI 32.87 kg/m2  Will continue to monitor.  Jennifer Murrell RN BSN     CRP came back at 116.0          "

## 2017-06-04 NOTE — PLAN OF CARE
Problem: Goal Outcome Summary  Goal: Goal Outcome Summary  Outcome: No Change  Upon assuming care after evenings, patient had elevated temp. Tylenol PO was given and temp resolved. Blood cultures were drawn. Patient had episode of soaking sweat through his gown during night. Pain being controlled with PO roxicodone. Ice packs over knee for noted edema. Pink/red noted as blotchy beyond prior markings but unchanging during night. Dressing clean, dry, intact. Voiding well, using urinal during night. Patient able to sleep between cares.

## 2017-06-04 NOTE — PROGRESS NOTES
"[facility] Orthopaedics Progress Note      Post-operative Day: 4 Days Post-Op    Procedure(s):  Left Total Knee Arthroplasty - Wound Class: I-Clean      Subjective:    Pain: moderate with activity, otherwise controlled at rest  Chest pain, SOB:  No  No BM. Voiding. Low appetite. No n/v. Tm 100.9 overnight triggering labs/blood cx.     Objective:  Blood pressure 136/75, pulse 81, temperature 99.6  F (37.6  C), temperature source Oral, resp. rate 18, height 1.93 m (6' 4\"), weight 122.5 kg (270 lb), SpO2 96 %.    Patient Vitals for the past 24 hrs:   BP Temp Temp src Pulse Heart Rate Resp SpO2   06/04/17 0733 136/75 99.6  F (37.6  C) Oral 81 - 18 96 %   06/04/17 0335 136/72 98.7  F (37.1  C) Oral - 77 18 -   06/04/17 0148 - 98.9  F (37.2  C) Oral - - 18 -   06/03/17 2257 130/71 100.9  F (38.3  C) Oral - 82 16 93 %   06/03/17 1810 - 99.4  F (37.4  C) Oral - - - -   06/03/17 1540 144/73 100.4  F (38  C) Oral - 88 16 94 %   06/03/17 1100 - 98.4  F (36.9  C) - - - - -       Wt Readings from Last 4 Encounters:   05/31/17 122.5 kg (270 lb)   11/17/16 125.2 kg (276 lb 0.3 oz)     A/Ox3, resting comfortably.     Motor function, sensation, and circulation intact   Yes  Wound status: incisions are clean dry and intact. Yes. Erythema near complete receded from demarcations. Ecchymotic anterior knee and proximal thigh (area of tourniquet).  Calf tenderness: Left  Yes mild ttp with mod edema LLE    Pertinent Labs   Lab Results: personally reviewed.     Recent Labs   Lab Test  06/03/17   0905  06/02/17   0638  06/01/17   0645  05/31/17   1038   11/17/16   0815   INR   --    --    --   1.04   --   0.95   HGB  12.7*  12.5*  12.4*  12.7*  15.3   < >  14.9   HCT  38.3*  37.1*   --   45.8   --   43.7   MCV  92  92   --   90   --   90   PLT  212  189   --   249   --   236   NA  138   --    --    --    --    --    CRP   --   79.6*   --    --    --    --     < > = values in this interval not displayed.       Plan: Anticoagulation " protocol: Lovenox inpatient and then  mg daily at discharge  x 42  days            Pain medications:  oxycodone and tylenol            Weight bearing status:  WBAT            Disposition:  Home likely tomorrow if continues to improve.              Continue cares and rehabilitation. Repeat u/s (prev 5/31 neg) and CRP today. Increase stool regimen. Appears inflammatory and not infectious at this time and continues to improve.     Report completed by:  Ale Moura PA-C, ARGENIS  Date: 6/4/2017  Time: 8:50 AM

## 2017-06-04 NOTE — PLAN OF CARE
"Problem: Goal Outcome Summary  Goal: Goal Outcome Summary  Outcome: No Change  Patient's left knee pink/swollen/warm to the touch. Unchanged since Saturday evening. Dressing is c/d/i with no numbness or tingling. Pink blotchiness noted into the upper left thigh. Ice on for comfort. PRN oxycodone 10mg every 4 hours per patient request.     Patient refused to get up into chair for dinner. Adequate oral intake. Writer approached patient following dinner and encouraged him to try to have a bowel movement and walk into the bathroom as he has not had a BM in 5 days. Patient refused, stating \"No... I'll get up tomorrow. I know my body and I do not have to go now. I am not getting up tonight.\" Patient continued to refuse to get up, despite encouragement.      Clear lung sounds, VS stable. Patient remains afebrile. /73 (BP Location: Left arm)  Pulse 87  Temp 99.7  F (37.6  C) (Oral)  Resp 18  Ht 1.93 m (6' 4\")  Wt 122.5 kg (270 lb)  SpO2 96%  BMI 32.87 kg/m2          "

## 2017-06-05 ENCOUNTER — APPOINTMENT (OUTPATIENT)
Dept: PHYSICAL THERAPY | Facility: CLINIC | Age: 67
DRG: 470 | End: 2017-06-05
Attending: ORTHOPAEDIC SURGERY
Payer: MEDICARE

## 2017-06-05 VITALS
DIASTOLIC BLOOD PRESSURE: 70 MMHG | HEART RATE: 87 BPM | TEMPERATURE: 98.7 F | OXYGEN SATURATION: 97 % | RESPIRATION RATE: 18 BRPM | SYSTOLIC BLOOD PRESSURE: 137 MMHG | BODY MASS INDEX: 32.88 KG/M2 | WEIGHT: 270 LBS | HEIGHT: 76 IN

## 2017-06-05 LAB
CRP SERPL-MCNC: 111 MG/L (ref 0–8)
ERYTHROCYTE [DISTWIDTH] IN BLOOD BY AUTOMATED COUNT: 12.9 % (ref 10–15)
HCT VFR BLD AUTO: 35.7 % (ref 40–53)
HGB BLD-MCNC: 11.8 G/DL (ref 13.3–17.7)
MCH RBC QN AUTO: 30.2 PG (ref 26.5–33)
MCHC RBC AUTO-ENTMCNC: 33.1 G/DL (ref 31.5–36.5)
MCV RBC AUTO: 91 FL (ref 78–100)
PLATELET # BLD AUTO: 254 10E9/L (ref 150–450)
RBC # BLD AUTO: 3.91 10E12/L (ref 4.4–5.9)
WBC # BLD AUTO: 7.6 10E9/L (ref 4–11)

## 2017-06-05 PROCEDURE — 40000193 ZZH STATISTIC PT WARD VISIT: Performed by: PHYSICAL THERAPIST

## 2017-06-05 PROCEDURE — 25000132 ZZH RX MED GY IP 250 OP 250 PS 637: Mod: GY | Performed by: PHYSICIAN ASSISTANT

## 2017-06-05 PROCEDURE — A9270 NON-COVERED ITEM OR SERVICE: HCPCS | Mod: GY | Performed by: PHYSICIAN ASSISTANT

## 2017-06-05 PROCEDURE — 40000275 ZZH STATISTIC RCP TIME EA 10 MIN

## 2017-06-05 PROCEDURE — 99232 SBSQ HOSP IP/OBS MODERATE 35: CPT | Performed by: PHYSICIAN ASSISTANT

## 2017-06-05 PROCEDURE — 85027 COMPLETE CBC AUTOMATED: CPT | Performed by: FAMILY MEDICINE

## 2017-06-05 PROCEDURE — 97110 THERAPEUTIC EXERCISES: CPT | Mod: GP | Performed by: PHYSICAL THERAPIST

## 2017-06-05 PROCEDURE — A9270 NON-COVERED ITEM OR SERVICE: HCPCS | Mod: GY | Performed by: ORTHOPAEDIC SURGERY

## 2017-06-05 PROCEDURE — 36415 COLL VENOUS BLD VENIPUNCTURE: CPT | Performed by: FAMILY MEDICINE

## 2017-06-05 PROCEDURE — 86140 C-REACTIVE PROTEIN: CPT | Performed by: FAMILY MEDICINE

## 2017-06-05 PROCEDURE — 25000132 ZZH RX MED GY IP 250 OP 250 PS 637: Mod: GY | Performed by: ORTHOPAEDIC SURGERY

## 2017-06-05 RX ORDER — SPIRONOLACTONE 25 MG/1
25 TABLET ORAL DAILY
Status: DISCONTINUED | OUTPATIENT
Start: 2017-06-05 | End: 2017-06-05 | Stop reason: HOSPADM

## 2017-06-05 RX ORDER — HYDROMORPHONE HYDROCHLORIDE 2 MG/1
2-4 TABLET ORAL
Status: DISCONTINUED | OUTPATIENT
Start: 2017-06-05 | End: 2017-06-05 | Stop reason: HOSPADM

## 2017-06-05 RX ORDER — HYDROMORPHONE HYDROCHLORIDE 2 MG/1
2-4 TABLET ORAL
Qty: 60 TABLET | Refills: 0 | Status: SHIPPED | OUTPATIENT
Start: 2017-06-05 | End: 2017-08-03

## 2017-06-05 RX ADMIN — HYDROMORPHONE HYDROCHLORIDE 4 MG: 2 TABLET ORAL at 16:15

## 2017-06-05 RX ADMIN — HYDROMORPHONE HYDROCHLORIDE 4 MG: 2 TABLET ORAL at 13:16

## 2017-06-05 RX ADMIN — SPIRONOLACTONE 25 MG: 25 TABLET ORAL at 07:58

## 2017-06-05 RX ADMIN — DIPHENHYDRAMINE HYDROCHLORIDE 25 MG: 25 CAPSULE ORAL at 07:58

## 2017-06-05 RX ADMIN — HYDROMORPHONE HYDROCHLORIDE 4 MG: 2 TABLET ORAL at 10:19

## 2017-06-05 RX ADMIN — POLYETHYLENE GLYCOL 3350 17 G: 17 POWDER, FOR SOLUTION ORAL at 07:59

## 2017-06-05 RX ADMIN — OMEPRAZOLE 20 MG: 20 CAPSULE, DELAYED RELEASE ORAL at 07:58

## 2017-06-05 RX ADMIN — SENNOSIDES AND DOCUSATE SODIUM 2 TABLET: 8.6; 5 TABLET ORAL at 07:58

## 2017-06-05 RX ADMIN — OXYCODONE HYDROCHLORIDE 10 MG: 5 TABLET ORAL at 05:07

## 2017-06-05 RX ADMIN — BISACODYL 10 MG: 10 SUPPOSITORY RECTAL at 10:20

## 2017-06-05 RX ADMIN — LISINOPRIL AND HYDROCHLOROTHIAZIDE 2 TABLET: 12.5; 1 TABLET ORAL at 07:58

## 2017-06-05 RX ADMIN — DIPHENHYDRAMINE HYDROCHLORIDE 25 MG: 25 CAPSULE ORAL at 02:35

## 2017-06-05 RX ADMIN — RANITIDINE HYDROCHLORIDE 150 MG: 150 TABLET, FILM COATED ORAL at 07:58

## 2017-06-05 NOTE — PROGRESS NOTES
Patient up to the bathroom to attempt to have a bowel movement, but was unsuccessful. Writer provided prune juice to patient.     Patient requesting PRN suppository. Contacted on-call PA for orders. When writer re-approached patient to administer suppository, patient sleeping. Will hold off on suppository until patient awake.

## 2017-06-05 NOTE — PROGRESS NOTES
[facility]    Hospital Medicine   Cross Cover Note  Date of Service: 6/4/2017     I was called due to no BM for 5 days.  Patient continues to pass gas.  Denies significant abdominal pain.  Requesting suppository for bowel regimen support    Plan:  - daily PRN dulcolax suppository      Alejandrina Curran PA-C

## 2017-06-05 NOTE — PROGRESS NOTES
[facility] Medicine Progress Note  Date of Service: 06/05/2017    Assessment & Plan   Jorge Alberto Frye is a 66 year old male who presented on 5/31/2017 for scheduled Procedure(s):  ARTHROPLASTY KNEE by Jorge Alberto Jurado MD and is being followed by the hospital medicine service for co-management of acute and/or chronic perioperative medical problems.      S/p Procedure(s):  ARTHROPLASTY KNEE, LEFT   5 Days Post-Op    Pain improving and is tolerable at rest. Hg stable.   - pain control, wound cares, physical therapy, occupational therapy and DVT prophylaxis per orthopedic surgery service      Increasing Knee Pain with Erythema, Left  Suspect hemarthrosis, Left  New erythema noted POD #2 both above and below the knee with crepitus to lateral knee. US negative for DVT x2. ->111. PCT 0.51. Slowly improving without anti-biotics. Pain has been limiting factor. Suspect hemarthrosis due to erythema following premature pulling of drain.    Low grade temp overnight - 100. Patient feels this is overall improving.  -blood cultures show NGTD in prelim results  -CRP in AM if patient stays      RITA (acute kidney injury) (H)  Unclear etiology.  Baseline creatinine 0.9 - 1.2.  On POD#1, creatinine 1.44.  POD #2, 1.08.     Resolved with fluids and holding nephrotoxic agents.      CAD (coronary artery disease)  Questionable MI while in [place] 03/2017 (sneezed and had severe left rib pain) - upon interview, labs obtained (WBC was 10.5.  Troponin-T was 23.3 [range 0-14.0]). Patient had both an EKG and ECHO, but does not know the results of these tests and left the hospital AMA. On daily aspirin previous due to surgery.  - needs follow-up with PCP and cardiology  - will likely benefit from stress testing       Gastroesophageal reflux disease  - continue PTA Prilosec      Benign essential hypertension   Controlled. Anti-hypertensives were held initially due to RITA.  - continue PTA  HCTZ/lisinopril today  - resume PTA spironolactone      Hyperlipidemia  Not using medication.      Asthma  - continue PTA Advair, albuterol    DVT Prophylaxis: as per orthopedic surgery service - Enoxaparin (Lovenox) SQ as IP with ASA on discharge.  Code Status: Full Code    Lines: PIV   Luke catheter: None    Discussion: Medically, the patient appears stable - left knee erythema has improved, pain slowly improving without anti-biotics and do not suspect infection at this time. VSS.    Disposition: Anticipate discharge today or tomorrow pending pain control and passing PT goals. Patient is reluctant to activity.     Attestation:  I have reviewed today's vital signs, notes, medications, labs and imaging.  Total time: 30 minutes    I have discussed patient with Dr. Eder Tubbs. Assessment and plan as above.    Lexie Berry PA-C      Interval History    Patient's pain is slowly improving and ROM is slowly improving of left knee. Working with therapy as he can but is reluctant to activity for nursing. Eating and sleeping well. No BM - has not allowed suppository that was ordered last night. Would like to try prune juice. Voiding well.     Anticipates going home when ready.    Denies fever - borderline temp over night, chills, CP, SOB, cough, abdominal pain, N/V/D, numbness or tingling or calf pain bilaterally in lower extremities.    Physical Exam   Temp:  [98.7  F (37.1  C)-100  F (37.8  C)] 98.7  F (37.1  C)  Pulse:  [87] 87  Heart Rate:  [77] 77  Resp:  [18] 18  BP: (134-147)/(70-73) 137/70  SpO2:  [95 %-97 %] 97 %    Weights:   Vitals:    05/31/17 1027   Weight: 122.5 kg (270 lb)    Body mass index is 32.87 kg/(m^2).    General: Appears comfortable. Alert and orientated. Pleasant and cooperative. NAD. Non-toxic. Appears stated age.   CV: RRR. Radial pulses are 2+ bilaterally. Distal pulses intact without lower extremity edema.  Respiratory: No accessory muscle usage. CTA bilaterally without wheezes, crackles  or rhonchi.   GI: Soft, non-tender, non-distended. Bowel sounds are normoactive.  Skin: Warm, dry, intact. Lower extremities are warm to the touch without skin mottling. Did not assess incision, dressing appear clean and dry.  Musculoskeletal: Muscle tone is appropriate. Left knee continues to have erythema but localized to surrounding joint. Well within prior markings. Increased warmth to the touch.  Neuro: Sensation to light touch of lower extremities is grossly intact.     Data     Recent Labs  Lab 06/05/17  0635 06/03/17  0905 06/02/17  0638 06/01/17  0645 05/31/17  2220 05/31/17  1038   WBC 7.6 9.0 9.2  --   --  7.3   HGB 11.8* 12.7* 12.5*  12.4* 12.7*  --  15.3   MCV 91 92 92  --   --  90    212 189  --   --  249   INR  --   --   --   --   --  1.04   NA  --  138  --   --   --   --    POTASSIUM  --  3.9  --   --   --   --    CHLORIDE  --  105  --   --   --   --    CO2  --  24  --   --   --   --    BUN  --  15  --   --   --   --    CR  --  1.01 1.08  --  1.44*  --    ANIONGAP  --  9  --   --   --   --    ALEXANDER  --  8.7  --   --   --   --    GLC  --  115* 94 119*  --   --        Recent Labs  Lab 06/03/17  0905 06/02/17  0638 06/01/17  0645   * 94 119*      Unresulted Labs Ordered in the Past 30 Days of this Admission     Date and Time Order Name Status Description    6/3/2017 2301 Blood culture Preliminary     6/3/2017 2301 Blood culture Preliminary          Imaging  Recent Results (from the past 24 hour(s))   US Lower Extremity Venous Duplex Left    Narrative    US LOWER EXTREMITY VENOUS DUPLEX LEFT 6/4/2017 11:44 AM    HISTORY: Left lower extremity pain and swelling.    TECHNIQUE: Imaged deep venous structures of the left lower extremity  include the left common femoral vein, femoral vein, popliteal vein,  and visualized posterior deep calf veins.  Color flow and spectral  Doppler with waveform analysis performed.    COMPARISON: None.    FINDINGS: No DVT is demonstrated.  There is a fluid  collection in the  left knee joint.    LARISA LARA MD        I reviewed all new labs and imaging results over the last 24 hours. I personally reviewed no images or EKG's today.    Medications        spironolactone (ALDACTONE) tablet 25 mg  25 mg Oral Daily     polyethylene glycol  17 g Oral Daily     lisinopril-hydrochlorothiazide  2 tablet Oral BID     fluticasone-salmeterol  1 puff Inhalation Q12H     omeprazole  20 mg Oral Daily     ranitidine  150 mg Oral BID     senna-docusate  1-2 tablet Oral BID     I have discussed patient with Dr. Eder Tubbs. Assessment and plan as above.    Lexie Berry PA-C    Update 11:00: patient pursued suppository due to no BM for 5 days, which was successful and patient had large BM. Patient will choose which pain medication he would like prior to discharge - oxycodone or oral dilaudid.    Update 3:00pm - patient has decided on oral dilaudid for pain control.     Lexie Berry PA-C

## 2017-06-05 NOTE — PLAN OF CARE
Problem: Goal Outcome Summary  Goal: Goal Outcome Summary  PT: Provided patient with TKA exercises in supine and sitting handout and gave pt instructions for performing at home.      Recommendation: Home with assist from family as needed and Outpatient PT at discharge

## 2017-06-05 NOTE — PLAN OF CARE
Problem: Goal Outcome Summary  Goal: Goal Outcome Summary  Physical Therapy Discharge Summary     Reason for therapy discharge:    Discharged to home with outpatient therapy.     Progress towards therapy goal(s). [facility] for goal details.  Goals partially met.  Barriers to achieving goals:   discharge from facility.     Therapy recommendation(s):    TKA home exericses (sitting and supine) and Outpatient PT to further address L-knee ROM and strength and to assist patient with returning to PLOF

## 2017-06-05 NOTE — PLAN OF CARE
Problem: Knee Replacement, Total (Adult)  Goal: Signs and Symptoms of Listed Potential Problems Will be Absent or Manageable (Knee Replacement, Total)  Signs and symptoms of listed potential problems will be absent or manageable by discharge/transition of care (reference Knee Replacement, Total (Adult) CPG).   Outcome: Improving  Encouraged Patient to get up out of bed for meals. Refused to get up for breakfast. Continues to use urinal instead of getting up to use bathroom. Started dilaudid p.o. To see if pain better controlled with it. Suppository given, had large results.    Problem: Discharge Planning  Goal: Discharge Planning (Adult, OB, Behavioral, Peds)  Outcome: No Change  Plans on going home at time of discharge.

## 2017-06-05 NOTE — CONSULTS
Transition Communication Hand-off for Care Transitions to Next Level of Care Provider    Name: Jorge Alberto Frye  MRN #: [id number]  Primary Care Provider: Rich Lloyd  Primary Care MD Name: Dr. Lloyd  Primary Clinic: [facility] [address] [address]  Primary Care [facility] Name: [facility]  Reason for Hospitalization:  degenerative joint disease  Left knee DJD  Admit Date/Time: 5/31/2017 10:08 AM  Discharge Date: 6/5/17  Payor Source: Payor: [employer] / Plan: [employer] / Product Type: HMO /     Readmission Assessment Measure (SEVEN) Risk Score/category: Low    Reason for Communication Hand-off Referral:  Home Care    Discharge Plan:  Home with [facility] ([phone] Fax: [phone])       Concern for non-adherence with plan of care:   Y/N No  Discharge Needs Assessment:  Needs       Most Recent Value    Transportation Available family or friend will provide    # of Referrals Placed by [facility]            Tana Gannon RN, Care Coordinator

## 2017-06-05 NOTE — DISCHARGE SUMMARY
[facility] Discharge Summary                                  [facility]     AZEB MARCANO [id number]   Age: 66 year old  PCP: Rich Lloyd, [phone] 1950     Date of Admission:  5/31/2017  Date of Discharge::  6/5/2017  Discharge Physician:  Shawnee Cloud    Code status:  Full Code    Admission Information:  Admission Diagnosis:  degenerative joint disease  Left knee DJD    Post-Operative Day: 5 Days Post-Op     Reason for admission:  The patient was admitted for the following:Procedure(s) (LRB):  ARTHROPLASTY KNEE (Left)    Principal Problem:    Status post total left knee replacement  Active Problems:    Gastroesophageal reflux disease    Benign essential hypertension    Hyperlipidemia    Asthma    RITA (acute kidney injury) (H)    CAD (coronary artery disease)      Allergies:  Amlodipine    Following the procedure noted above the patient was transferred to the post-op floor and started on:    Therapy:  physical therapy and occupational therapy  Anticoagulation Plan: Lovenox inpatient and then  mg daily at discharge  for 42 days  Pain Management: dilaudid, tylenol and vistaril  Weight bearing status: Weight bearing as tolerated     The patient was followed and co-managed by the hospitalist service during the inpatient treatment course  Complications:  Patient was treated by internal medicine for an RITA post operatively. He was held in the hospital until POD 5 for uncontrolled pain with increased edema and erythema to L knee which is likely due to a Hemarthrosis. On POD 5 his erythema and edema have improved but not completely resolved and he is to be discharged home today with home health.   Consultations:  None     Pertinent Labs   Lab Results: personally reviewed.     Recent Labs   Lab Test  06/05/17   0635  06/04/17   1329  06/03/17   0905  06/02/17   0638   05/31/17   1038   11/17/16   0815   INR   --    --    --    --    --   1.04   --   0.95   HGB  11.8*    --   12.7*  12.5*  12.4*   < >  15.3   < >  14.9   HCT  35.7*   --   38.3*  37.1*   --   45.8   --   43.7   MCV  91   --   92  92   --   90   --   90   PLT  254   --   212  189   --   249   --   236   NA   --    --   138   --    --    --    --    --    CRP  111.0*  116.0*   --   79.6*   --    --    --    --     < > = values in this interval not displayed.          Discharge Information:  Condition at discharge: Stable  Discharge destination:  Discharged to home with home health     Medications at discharge:  Current Discharge Medication List      START taking these medications    Details   HYDROmorphone (DILAUDID) 2 MG tablet Take 1-2 tablets (2-4 mg) by mouth every 3 hours as needed for moderate to severe pain  Qty: 60 tablet, Refills: 0    Associated Diagnoses: Status post total left knee replacement      oxyCODONE (ROXICODONE) 5 MG IR tablet Take 1-2 tablets (5-10 mg) by mouth every 4 hours as needed for moderate to severe pain  Qty: 50 tablet, Refills: 0    Associated Diagnoses: Status post total left knee replacement      hydrOXYzine (ATARAX) 10 MG tablet Take 1 tablet (10 mg) by mouth every 6 hours as needed for itching or other (adjunct pain)  Qty: 50 tablet, Refills: 0    Associated Diagnoses: Status post total left knee replacement      senna-docusate (SENOKOT-S;PERICOLACE) 8.6-50 MG per tablet Take 1-2 tablets by mouth 2 times daily  Qty: 100 tablet, Refills: 1    Associated Diagnoses: Status post total left knee replacement      order for DME Equipment being ordered: Quad Cane (), Raised Toilet Seats (), Bath Seat () and Other: long reach grasper and hand held shower attachment  Treatment Diagnosis: s/p L TKA  Qty: 1 Units, Refills: 0    Associated Diagnoses: Status post total shoulder arthroplasty, right         CONTINUE these medications which have CHANGED    Details   aspirin  MG EC tablet Take 1 tablet (325 mg) by mouth daily  Qty: 30 tablet, Refills: 1    Comments: May resume  previous dose of 81 mg Aspirin daily once finished with 325 mg dose  Associated Diagnoses: Status post total left knee replacement         CONTINUE these medications which have NOT CHANGED    Details   albuterol (2.5 MG/3ML) 0.083% neb solution Inhale 2.5 mg into the lungs every 4 hours as needed      fluticasone-salmeterol (ADVAIR) 250-50 MCG/DOSE diskus inhaler Inhale 1 puff into the lungs 2 times daily      lisinopril-hydrochlorothiazide (PRINZIDE,ZESTORETIC) 20-25 MG per tablet Take 1 tablet by mouth 2 times daily      Omeprazole Magnesium (PRILOSEC OTC PO) Take 20 mg by mouth daily       SPIRONOLACTONE PO Take 25 mg by mouth daily       albuterol (PROAIR HFA, PROVENTIL HFA, VENTOLIN HFA) 108 (90 BASE) MCG/ACT inhaler Inhale 2 puffs into the lungs every 4 hours as needed       garlic 150 MG TABS Take 150 mg by mouth daily      Omega-3 Fatty Acids (OMEGA-3 FISH OIL PO) Take 2 g by mouth daily       MULTIPLE VITAMINS PO Take 1 tablet by mouth daily       VITAMIN D, CHOLECALCIFEROL, PO Take 5,000 Units by mouth daily       SILDENAFIL CITRATE PO Take 20-40 mg by mouth daily as needed                         Follow-Up Care:  Patient should be seen in the office in 10-14 days by the Orthopedic Surgeon/Physician Assistant.  Call [phone] for appointment or questions.    Shawnee Cloud

## 2017-06-05 NOTE — PHARMACY - DISCHARGE MEDICATION RECONCILIATION
Discharge medication review for this patient is complete. Pharmacist assisted with medication reconciliation of discharge medications with prior to admission medications.     The following changes were made to the discharge medication list based on pharmacist review:  Added:  none  Discontinued: oxycodone as duplicate with hydromorphone  Changed: none      Patient's Discharge Medication List  - medications as listed on After Visit Summary (AVS)     Review of your medicines      START taking       Dose / Directions    HYDROmorphone 2 MG tablet   Commonly known as:  DILAUDID        Dose:  2-4 mg   Take 1-2 tablets (2-4 mg) by mouth every 3 hours as needed for moderate to severe pain   Quantity:  60 tablet   Refills:  0       hydrOXYzine 10 MG tablet   Commonly known as:  ATARAX        Dose:  10 mg   Take 1 tablet (10 mg) by mouth every 6 hours as needed for itching or other (adjunct pain)   Quantity:  50 tablet   Refills:  0       order for DME   Used for:  Status post total shoulder arthroplasty, right        Equipment being ordered: Quad Cane (), Raised Toilet Seats (), Bath Seat () and Other: long reach grasper and hand held shower attachment Treatment Diagnosis: s/p L TKA   Quantity:  1 Units   Refills:  0       senna-docusate 8.6-50 MG per tablet   Commonly known as:  SENOKOT-S;PERICOLACE        Dose:  1-2 tablet   Take 1-2 tablets by mouth 2 times daily   Quantity:  100 tablet   Refills:  1         CONTINUE these medicines which may have CHANGED, or have new prescriptions. If we are uncertain of the size of tablets/capsules you have at home, strength may be listed as something that might have changed.       Dose / Directions    aspirin  MG EC tablet   This may have changed:    - medication strength  - how much to take        Dose:  325 mg   Take 1 tablet (325 mg) by mouth daily   Quantity:  30 tablet   Refills:  1         CONTINUE these medicines which have NOT CHANGED       Dose /  Directions    * albuterol 108 (90 BASE) MCG/ACT Inhaler   Commonly known as:  PROAIR HFA/PROVENTIL HFA/VENTOLIN HFA        Dose:  2 puff   Inhale 2 puffs into the lungs every 4 hours as needed   Refills:  0       * albuterol (2.5 MG/3ML) 0.083% neb solution        Dose:  2.5 mg   Inhale 2.5 mg into the lungs every 4 hours as needed   Refills:  0       fluticasone-salmeterol 250-50 MCG/DOSE diskus inhaler   Commonly known as:  ADVAIR        Dose:  1 puff   Inhale 1 puff into the lungs 2 times daily   Refills:  0       garlic 150 MG Tabs tablet        Dose:  150 mg   Take 150 mg by mouth daily   Refills:  0       lisinopril-hydrochlorothiazide 20-25 MG per tablet   Commonly known as:  PRINZIDE/ZESTORETIC        Dose:  1 tablet   Take 1 tablet by mouth 2 times daily   Refills:  0       MULTIPLE VITAMINS PO        Dose:  1 tablet   Take 1 tablet by mouth daily   Refills:  0       OMEGA-3 FISH OIL PO        Dose:  2 g   Take 2 g by mouth daily   Refills:  0       PRILOSEC OTC PO        Dose:  20 mg   Take 20 mg by mouth daily   Refills:  0       SILDENAFIL CITRATE PO        Dose:  20-40 mg   Take 20-40 mg by mouth daily as needed   Refills:  0       SPIRONOLACTONE PO        Dose:  25 mg   Take 25 mg by mouth daily   Refills:  0       VITAMIN D (CHOLECALCIFEROL) PO        Dose:  5000 Units   Take 5,000 Units by mouth daily   Refills:  0       * Notice:  This list has 2 medication(s) that are the same as other medications prescribed for you. Read the directions carefully, and ask your doctor or other care provider to review them with you.         Where to get your medicines      These medications were sent to [facility] [id number] [facility] [address] [facility]  [address] [place] [id number]     Phone:  [phone]      aspirin  MG EC tablet     hydrOXYzine 10 MG tablet     senna-docusate 8.6-50 MG per tablet         Some of these will need a paper prescription and others can be bought  over the counter. Ask your nurse if you have questions.     Bring a paper prescription for each of these medications      HYDROmorphone 2 MG tablet     order for DME

## 2017-06-05 NOTE — PROGRESS NOTES
"[facility] Orthopaedics Progress Note      Post-operative Day: 5 Days Post-Op    Procedure(s):  Left Total Knee Arthroplasty - Wound Class: I-Clean      Subjective:    Pain: moderate  aching and dull to L knee which is improving slowly. Denies shooting pain, parasthesias, numbness and weakness.   denies Chest pain, SOB, O2 required: None  denies nausea/ emesis  denies lightheadedness/ dizziness  BM -, passing gas +. Is hesitant to try suppository, offered enema and patient is willing to do so. Urinating well, Luke in place: no.       Objective:  Blood pressure 137/70, pulse 87, temperature 98.7  F (37.1  C), temperature source Oral, resp. rate 18, height 1.93 m (6' 4\"), weight 122.5 kg (270 lb), SpO2 97 %.    Patient Vitals for the past 24 hrs:   BP Temp Temp src Pulse Heart Rate Resp SpO2   06/05/17 0813 137/70 98.7  F (37.1  C) Oral - 77 - 97 %   06/05/17 0238 - 98.8  F (37.1  C) Oral - - - -   06/04/17 2328 134/73 100  F (37.8  C) Oral 87 - - 95 %   06/04/17 1537 147/73 99.7  F (37.6  C) Oral 87 - 18 96 %       Wt Readings from Last 4 Encounters:   05/31/17 122.5 kg (270 lb)   11/17/16 125.2 kg (276 lb 0.3 oz)     General: Alert and orientated. No apparent distress. Non-labored breathing. Appropriate affect.   MSK: L knee: dressing Clean, dry, and intact. Skin intact, mild ecchymosis to tourniquet site ecchymosis, erythema improving and now localized to the knee joint. tender to palpation to incision site. ROM remains limited due to pain. Distal neurovascularly intact. Compartments soft and non-tender. No calf pain. Homans negative. PF/DF and EHL intact.       Pertinent Labs   Lab Results: personally reviewed.     Recent Labs   Lab Test  06/05/17   0635  06/04/17   1329  06/03/17   0905  06/02/17   0638   05/31/17   1038   11/17/16   0815   INR   --    --    --    --    --   1.04   --   0.95   HGB  11.8*   --   12.7*  12.5*  12.4*   < >  15.3   < >  14.9   HCT  35.7*   --   38.3*  37.1*   --   45.8   --   43.7 "   MCV  91   --   92  92   --   90   --   90   PLT  254   --   212  189   --   249   --   236   NA   --    --   138   --    --    --    --    --    CRP  111.0*  116.0*   --   79.6*   --    --    --    --     < > = values in this interval not displayed.       Plan: Anticoagulation protocol: Lovenox inpatient and then  mg daily at discharge  x 42  days            Pain medications:  dilaudid, tylenol, vistaril. Will try PO dilaudid prior to d/c today given patient reporting minimal effects with Oxycodone            Weight bearing status:  WBAT            Disposition:  Home with home health after passing a bowel movement today             Follow up: 2 week with ARGENIS            Continue cares and rehabilitation     Report completed by:  Shawnee Cloud PA-C  Date: 6/5/2017  Time: 9:56 AM

## 2017-06-05 NOTE — PLAN OF CARE
Problem: Goal Outcome Summary  Goal: Goal Outcome Summary  Outcome: No Change  Patient had one elevated temp during night that resolved with Tylenol. Left leg/thigh noted pink blotchy with improvement receding toward markings. Ice packs over knee. Dressing clean, dry, intact. Pain being controlled with Oxy 10mg. Patient refused Dulcolax suppository and that he might try it in the morning. Patient independently positioning in bed, however, does not want to get up out of bed to use bathroom and using urinal instead. Movement and activity encouraged.

## 2017-06-05 NOTE — PLAN OF CARE
Problem: Goal Outcome Summary  Goal: Goal Outcome Summary  Physical Therapy Discharge Summary     Reason for therapy discharge:    Discharged to home with home therapy.     Progress towards therapy goal(s). See goals on Care Plan in [facility] electronic health record for goal details.  Goals partially met.  Barriers to achieving goals:   discharge from facility.     Therapy recommendation(s):    Initial home PT with progression to OP PT to further address L-knee strength, ROM, gait and mobility training to assist pt in returning to PLOF

## 2017-06-10 LAB
BACTERIA SPEC CULT: NORMAL
BACTERIA SPEC CULT: NORMAL
Lab: NORMAL
Lab: NORMAL
MICRO REPORT STATUS: NORMAL
MICRO REPORT STATUS: NORMAL
SPECIMEN SOURCE: NORMAL
SPECIMEN SOURCE: NORMAL

## 2017-08-03 ENCOUNTER — OFFICE VISIT (OUTPATIENT)
Dept: PODIATRY | Facility: CLINIC | Age: 67
End: 2017-08-03
Payer: COMMERCIAL

## 2017-08-03 VITALS — WEIGHT: 274 LBS | BODY MASS INDEX: 34.07 KG/M2 | HEIGHT: 75 IN

## 2017-08-03 DIAGNOSIS — M76.72 PERONEAL TENDINITIS OF LEFT LOWER EXTREMITY: ICD-10-CM

## 2017-08-03 DIAGNOSIS — B35.3 TINEA PEDIS OF BOTH FEET: ICD-10-CM

## 2017-08-03 DIAGNOSIS — B35.1 ONYCHOMYCOSIS OF LEFT GREAT TOE: Primary | ICD-10-CM

## 2017-08-03 DIAGNOSIS — D21.20 FIBROMA OF FOOT, UNSPECIFIED LATERALITY: ICD-10-CM

## 2017-08-03 PROCEDURE — 99203 OFFICE O/P NEW LOW 30 MIN: CPT | Performed by: PODIATRIST

## 2017-08-03 RX ORDER — CICLOPIROX 80 MG/ML
SOLUTION TOPICAL DAILY
Qty: 1 BOTTLE | Refills: 0 | Status: SHIPPED | OUTPATIENT
Start: 2017-08-03 | End: 2018-02-23

## 2017-08-03 NOTE — PATIENT INSTRUCTIONS
Initial musculoskeletal treatment recommendation:    1.  Stretch the calf muscles as instructed once an hour.  2.  Ice the injured area in the evening; 20 min on/off.  3.  Take antiinflammatory medication as directed.  4.  Massage the soft tissues around the injured area in the morning to loosen the tissue  5.  Wear supportive foot wear and/or arch supports (rigid not cushion) such as Superfeet inserts which can be found at [facility] or [facility].  6.  Terbinafine cream 1%    If no improvement in symptoms within four to six weeks, return to clinic for reevaluation.

## 2017-08-03 NOTE — MR AVS SNAPSHOT
After Visit Summary   8/3/2017    Jorge Alberto Frye    MRN: [id number]           Patient Information     Date Of Birth          1950        Visit Information        Provider Department      8/3/2017 10:00 AM Sriram Jefferson DPM [facility]        Today's Diagnoses     Onychomycosis of left great toe    -  1    Tinea pedis of both feet        Peroneal tendinitis of left lower extremity        Fibroma of foot, unspecified laterality          Care Instructions    Initial musculoskeletal treatment recommendation:    1.  Stretch the calf muscles as instructed once an hour.  2.  Ice the injured area in the evening; 20 min on/off.  3.  Take antiinflammatory medication as directed.  4.  Massage the soft tissues around the injured area in the morning to loosen the tissue  5.  Wear supportive foot wear and/or arch supports (rigid not cushion) such as Superfeet inserts which can be found at [facility] or [facility].  6.  Terbinafine cream 1%    If no improvement in symptoms within four to six weeks, return to clinic for reevaluation.            Follow-ups after your visit        Additional Services     ORTHOTICS REFERRAL       Please be aware that coverage of these services is subject to the terms and limitations of your health insurance plan.  Call member services at your health plan with any benefit or coverage questions.      Please bring the following to your appointment:    >>   Any x-rays, CTs or MRIs which have been performed.  Contact the facility where they were done to arrange for  prior to your scheduled appointment.  Any new CT, MRI or other procedures ordered by your specialist must be performed at a [facility] facility or coordinated by your clinic's referral office.    >>   List of current medications   >>   This referral request   >>   Any documents/labs given to you for this referral    ==This Referral PRINTS in the [facility] (ORTHOTICS &  "PROSTHETICS) Central scheduling office ==     The [facility] Central Scheduling staff will contact patient to arrange appointments. Central Scheduling Phone #:  CHRISTOPHER [place]  [phone]     Orthotics: Foot Orthotics accommodative                  Follow-up notes from your care team     Return if symptoms worsen or fail to improve.      Who to contact     If you have questions or need follow up information about today's clinic visit or your schedule please contact [facility] directly at [phone].  Normal or non-critical lab and imaging results will be communicated to you by [employer], letter or phone within 4 business days after the clinic has received the results. If you do not hear from us within 7 days, please contact the clinic through [employer] or phone. If you have a critical or abnormal lab result, we will notify you by phone as soon as possible.  Submit refill requests through [employer] or call your pharmacy and they will forward the refill request to us. Please allow 3 business days for your refill to be completed.          Additional Information About Your Visit        [employer] Information     [employer] gives you secure access to your electronic health record. If you see a primary care provider, you can also send messages to your care team and make appointments. If you have questions, please call your primary care clinic.  If you do not have a primary care provider, please call [phone] and they will assist you.        Care EveryWhere ID     This is your Care EveryWhere ID. This could be used by other organizations to access your [facility] medical records  [id number]        Your Vitals Were     Height BMI (Body Mass Index)                1.905 m (6' 3\") 34.25 kg/m2           Blood Pressure from Last 3 Encounters:   06/05/17 137/70   11/19/16 118/68    Weight from Last 3 Encounters:   08/03/17 124.3 kg (274 lb)   05/31/17 122.5 kg (270 lb)   11/17/16 125.2 kg (276 " lb 0.3 oz)              We Performed the Following     ORTHOTICS REFERRAL          Today's Medication Changes          These changes are accurate as of: 8/3/17 10:16 AM.  If you have any questions, ask your nurse or doctor.               Start taking these medicines.        Dose/Directions    ciclopirox 8 % Soln   Used for:  Onychomycosis of left great toe   Started by:  Sriram Jefferson DPM        Externally apply topically daily   Quantity:  1 Bottle   Refills:  0         These medicines have changed or have updated prescriptions.        Dose/Directions    * order for DME   This may have changed:  Another medication with the same name was added. Make sure you understand how and when to take each.   Used for:  Status post total shoulder arthroplasty, right        Equipment being ordered: Quad Cane (), Raised Toilet Seats (), Bath Seat () and Other: long reach grasper and hand held shower attachment Treatment Diagnosis: s/p L TKA   Quantity:  1 Units   Refills:  0       * order for DME   This may have changed:  You were already taking a medication with the same name, and this prescription was added. Make sure you understand how and when to take each.   Used for:  Peroneal tendinitis of left lower extremity   Changed by:  Sriram Jefferson DPM        Trilok Ankle Brace   Quantity:  1 Device   Refills:  0       * Notice:  This list has 2 medication(s) that are the same as other medications prescribed for you. Read the directions carefully, and ask your doctor or other care provider to review them with you.      Stop taking these medicines if you haven't already. Please contact your care team if you have questions.     HYDROmorphone 2 MG tablet   Commonly known as:  DILAUDID   Stopped by:  Sriram Jefferson DPM           hydrOXYzine 10 MG tablet   Commonly known as:  ATARAX   Stopped by:  Sriram Jefferson DPM           senna-docusate 8.6-50 MG per tablet   Commonly known as:   SENOKOT-S;PERICOLACE   Stopped by:  Sriram Jefferson DPM                Where to get your medicines      These medications were sent to [facility] - [place], [place] - [address] [facility] [address]  [address] [facility] [address], [place] [address]     Phone:  [phone]     ciclopirox 8 % Soln         Some of these will need a paper prescription and others can be bought over the counter.  Ask your nurse if you have questions.     Bring a paper prescription for each of these medications     order for DME                Primary Care Provider Office Phone # Fax #    Rich Lloyd [phone]       [facility] [address] [facility] [address]        Equal Access to Services     MADHU GROSS : Hadii scott gan hadasho Soomaali, waaxda luqadaha, qaybta kaalmada adeegyada, julissa luu haymarisel esparza . [facility] [phone].    ATENCIÓN: Si habla español, tiene a echevarria disposición servicios gratuitos de asistencia lingüística. [facility] [phone].    We comply with applicable federal civil rights laws and [place] laws. We do not discriminate on the basis of race, color, national origin, age, disability sex, sexual orientation or gender identity.            Thank you!     Thank you for choosing [facility]  for your care. Our goal is always to provide you with excellent care. Hearing back from our patients is one way we can continue to improve our services. Please take a few minutes to complete the written survey that you may receive in the mail after your visit with us. Thank you!             Your Updated Medication List - Protect others around you: Learn how to safely use, store and throw away your medicines at www.disposemymeds.org.          This list is accurate as of: 8/3/17 10:16 AM.  Always use your most recent med list.                   Brand Name Dispense Instructions for use Diagnosis    * albuterol 108 (90 BASE)  MCG/ACT Inhaler    PROAIR HFA/PROVENTIL HFA/VENTOLIN HFA     Inhale 2 puffs into the lungs every 4 hours as needed        * albuterol (2.5 MG/3ML) 0.083% neb solution      Inhale 2.5 mg into the lungs every 4 hours as needed        ciclopirox 8 % Soln     1 Bottle    Externally apply topically daily    Onychomycosis of left great toe       fluticasone-salmeterol 250-50 MCG/DOSE diskus inhaler    ADVAIR     Inhale 1 puff into the lungs 2 times daily        garlic 150 MG Tabs tablet      Take 150 mg by mouth daily        lisinopril-hydrochlorothiazide 20-25 MG per tablet    PRINZIDE/ZESTORETIC     Take 1 tablet by mouth 2 times daily        MULTIPLE VITAMINS PO      Take 1 tablet by mouth daily        OMEGA-3 FISH OIL PO      Take 2 g by mouth daily        * order for DME     1 Units    Equipment being ordered: Quad Cane (), Raised Toilet Seats (), Bath Seat () and Other: long reach grasper and hand held shower attachment Treatment Diagnosis: s/p L TKA    Status post total shoulder arthroplasty, right       * order for DME     1 Device    Trilok Ankle Brace    Peroneal tendinitis of left lower extremity       PRILOSEC OTC PO      Take 20 mg by mouth daily        SILDENAFIL CITRATE PO      Take 20-40 mg by mouth daily as needed        SPIRONOLACTONE PO      Take 25 mg by mouth daily        VITAMIN D (CHOLECALCIFEROL) PO      Take 5,000 Units by mouth daily        * Notice:  This list has 4 medication(s) that are the same as other medications prescribed for you. Read the directions carefully, and ask your doctor or other care provider to review them with you.

## 2017-08-03 NOTE — PROGRESS NOTES
PATIENT HISTORY:  Jorge Alberto Frye is a 66 year old male who presents to clinic for a painful left foot .  The patient describes the pain as sharp stabbing.  The patient relates pain is located over the site of the left foot and ankle.  The patient relates the pain has been present for the past several weeks.  The patient relates pain with ambulation.  The patient has tried different shoes with little relief.      REVIEW OF SYSTEMS:  Constitutional, HEENT, cardiovascular, pulmonary, GI, , musculoskeletal, neuro, skin, endocrine and psych systems are negative, except as otherwise noted.     PAST MEDICAL HISTORY:   Past Medical History:   Diagnosis Date     Acute pancreatitis 12/19/2006    Overview:  3/06     had negative w/u for etiology. ? cause    gb eval negative       Impotence      Spermatocele     left        PAST SURGICAL HISTORY:   Past Surgical History:   Procedure Laterality Date     ARTHROPLASTY KNEE Left 5/31/2017    Procedure: ARTHROPLASTY KNEE;  Left Total Knee Arthroplasty;  Surgeon: Jorge Alberto Jurado MD;  Location: [facility]     ARTHROPLASTY SHOULDER Right 11/17/2016     ARTHROPLASTY SHOULDER Right 11/17/2016    Procedure: ARTHROPLASTY SHOULDER;  Surgeon: Jorge Alberto Jurado MD;  Location: [facility] OR     HERNIA REPAIR       TONSILLECTOMY & ADENOIDECTOMY       UPPER GI ENDOSCOPY      gastritis        MEDICATIONS:   Current Outpatient Prescriptions:      order for DME, Equipment being ordered: Quad Cane (), Raised Toilet Seats (), Bath Seat () and Other: long reach grasper and hand held shower attachment Treatment Diagnosis: s/p L TKA, Disp: 1 Units, Rfl: 0     albuterol (2.5 MG/3ML) 0.083% neb solution, Inhale 2.5 mg into the lungs every 4 hours as needed, Disp: , Rfl:      fluticasone-salmeterol (ADVAIR) 250-50 MCG/DOSE diskus inhaler, Inhale 1 puff into the lungs 2 times daily, Disp: , Rfl:      lisinopril-hydrochlorothiazide (PRINZIDE,ZESTORETIC) 20-25 MG per tablet, Take 1 tablet by mouth 2  "times daily, Disp: , Rfl:      Omeprazole Magnesium (PRILOSEC OTC PO), Take 20 mg by mouth daily , Disp: , Rfl:      SPIRONOLACTONE PO, Take 25 mg by mouth daily , Disp: , Rfl:      albuterol (PROAIR HFA, PROVENTIL HFA, VENTOLIN HFA) 108 (90 BASE) MCG/ACT inhaler, Inhale 2 puffs into the lungs every 4 hours as needed , Disp: , Rfl:      garlic 150 MG TABS, Take 150 mg by mouth daily, Disp: , Rfl:      Omega-3 Fatty Acids (OMEGA-3 FISH OIL PO), Take 2 g by mouth daily , Disp: , Rfl:      MULTIPLE VITAMINS PO, Take 1 tablet by mouth daily , Disp: , Rfl:      VITAMIN D, CHOLECALCIFEROL, PO, Take 5,000 Units by mouth daily , Disp: , Rfl:      SILDENAFIL CITRATE PO, Take 20-40 mg by mouth daily as needed , Disp: , Rfl:      ALLERGIES:    Allergies   Allergen Reactions     Amlodipine Swelling     Swelling of legs        SOCIAL HISTORY:   Social History     Social History     Marital status:      Spouse name: N/A     Number of children: N/A     Years of education: N/A     Occupational History     Not on file.     Social History Main Topics     Smoking status: Former Smoker     Quit date: 1/1/1984     Smokeless tobacco: Not on file     Alcohol use 0.0 oz/week     0 Standard drinks or equivalent per week     Drug use: Not on file     Sexual activity: Not on file     Other Topics Concern     Not on file     Social History Narrative        FAMILY HISTORY:   Family History   Problem Relation Age of Onset     Lung Cancer Mother      Cervical Cancer Mother      Prostate Cancer Father      Lung Cancer Maternal Grandfather      Breast Cancer Sister         EXAM:Vitals: Ht 1.905 m (6' 3\")  Wt 124.3 kg (274 lb)  BMI 34.25 kg/m2  BMI= Body mass index is 34.25 kg/(m^2).    Weight management plan: Patient was referred to their PCP to discuss a diet and exercise plan.    General appearance: Patient is alert and fully cooperative with history & exam.  No sign of distress is noted during the visit.     Psychiatric: Affect is " pleasant & appropriate.  Patient appears motivated to improve health.     Respiratory: Breathing is regular & unlabored while sitting.     HEENT: Hearing is intact to spoken word.  Speech is clear.  No gross evidence of visual impairment that would impact ambulation.     Dermatologic: Skin is intact to both lower extremities without significant lesions, rash or abrasion.  No paronychia or evidence of soft tissue infection is noted.  One notes athlete's foot appearance bilateral feet as well as fungal toenail of the left great toe.     Vascular: DP & PT pulses are intact & regular bilaterally.  No significant edema or varicosities noted.  CFT and skin temperature is normal to both lower extremities.     Neurologic: Lower extremity sensation is intact to light touch.  No evidence of weakness or contracture in the lower extremities.  No evidence of neuropathy.     Musculoskeletal: Patient is ambulatory without assistive device or brace.  No gross ankle deformity noted.  No foot or ankle joint effusion is noted.  No pain on palpation over the peroneal tendon on the left foot and ankle. No surrounding erythema or edema noted. One notes a firm palpable soft tissue mass on the plantar aspect of the right and left arch. The skin moves freely over the soft tissue mass. No surrounding erythema noted.           ASSESSMENT / PLAN:     ICD-10-CM    1. Onychomycosis of left great toe B35.1    2. Tinea pedis of both feet B35.3        I have explained to Jorge Alberto  about the conditions.  We discussed the nature of the condition as well as the treatment plan and expected length of recovery.  At this time,the patient was instructed on icing, stretching, tissue massage and support.  The patient was referred to [facility] for custom orthotics that will aid in offloading the tension forces to the soft tissues and prevent further inflammation.  The patient was fitted with a Tri-Lock ankle brace that will aid in  offloading the tension forces to the soft tissues and prevent further inflammation.  The patient was prescribed topical Penlac application to the affected toenail. The patient will return in four weeks for reevaluation if the symptoms do not resolve.        Disclaimer: This note consists of symbols derived from keyboarding, dictation and/or voice recognition software. As a result, there may be errors in the script that have gone undetected. Please consider this when interpreting information found in this chart.       HAYDE Jefferson D.P.M., F.A.C.F.A.S.

## 2017-08-03 NOTE — LETTER
8/3/2017         RE: Jorge Alberto Frye  [address]  [facility] [address]        Dear Colleague,    Thank you for referring your patient, Jorge Alberto Frye, to the [facility]. Please see a copy of my visit note below.    PATIENT HISTORY:  Jorge Alberto Frye is a 66 year old male who presents to clinic for a painful left foot .  The patient describes the pain as sharp stabbing.  The patient relates pain is located over the site of the left foot and ankle.  The patient relates the pain has been present for the past several weeks.  The patient relates pain with ambulation.  The patient has tried different shoes with little relief.      REVIEW OF SYSTEMS:  Constitutional, HEENT, cardiovascular, pulmonary, GI, , musculoskeletal, neuro, skin, endocrine and psych systems are negative, except as otherwise noted.     PAST MEDICAL HISTORY:   Past Medical History:   Diagnosis Date     Acute pancreatitis 12/19/2006    Overview:  3/06     had negative w/u for etiology. ? cause    gb eval negative       Impotence      Spermatocele     left        PAST SURGICAL HISTORY:   Past Surgical History:   Procedure Laterality Date     ARTHROPLASTY KNEE Left 5/31/2017    Procedure: ARTHROPLASTY KNEE;  Left Total Knee Arthroplasty;  Surgeon: Jorge Alberto Jurado MD;  Location: [facility]     ARTHROPLASTY SHOULDER Right 11/17/2016     ARTHROPLASTY SHOULDER Right 11/17/2016    Procedure: ARTHROPLASTY SHOULDER;  Surgeon: Jorge Alberto Jurado MD;  Location: [facility]     HERNIA REPAIR       TONSILLECTOMY & ADENOIDECTOMY       UPPER GI ENDOSCOPY      gastritis        MEDICATIONS:   Current Outpatient Prescriptions:      order for DME, Equipment being ordered: Quad Cane (), Raised Toilet Seats (), Bath Seat () and Other: long reach grasper and hand held shower attachment Treatment Diagnosis: s/p L TKA, Disp: 1 Units, Rfl: 0     albuterol (2.5 MG/3ML) 0.083% neb solution, Inhale 2.5 mg into the lungs every 4 hours  "as needed, Disp: , Rfl:      fluticasone-salmeterol (ADVAIR) 250-50 MCG/DOSE diskus inhaler, Inhale 1 puff into the lungs 2 times daily, Disp: , Rfl:      lisinopril-hydrochlorothiazide (PRINZIDE,ZESTORETIC) 20-25 MG per tablet, Take 1 tablet by mouth 2 times daily, Disp: , Rfl:      Omeprazole Magnesium (PRILOSEC OTC PO), Take 20 mg by mouth daily , Disp: , Rfl:      SPIRONOLACTONE PO, Take 25 mg by mouth daily , Disp: , Rfl:      albuterol (PROAIR HFA, PROVENTIL HFA, VENTOLIN HFA) 108 (90 BASE) MCG/ACT inhaler, Inhale 2 puffs into the lungs every 4 hours as needed , Disp: , Rfl:      garlic 150 MG TABS, Take 150 mg by mouth daily, Disp: , Rfl:      Omega-3 Fatty Acids (OMEGA-3 FISH OIL PO), Take 2 g by mouth daily , Disp: , Rfl:      MULTIPLE VITAMINS PO, Take 1 tablet by mouth daily , Disp: , Rfl:      VITAMIN D, CHOLECALCIFEROL, PO, Take 5,000 Units by mouth daily , Disp: , Rfl:      SILDENAFIL CITRATE PO, Take 20-40 mg by mouth daily as needed , Disp: , Rfl:      ALLERGIES:    Allergies   Allergen Reactions     Amlodipine Swelling     Swelling of legs        SOCIAL HISTORY:   Social History     Social History     Marital status:      Spouse name: N/A     Number of children: N/A     Years of education: N/A     Occupational History     Not on file.     Social History Main Topics     Smoking status: Former Smoker     Quit date: 1/1/1984     Smokeless tobacco: Not on file     Alcohol use 0.0 oz/week     0 Standard drinks or equivalent per week     Drug use: Not on file     Sexual activity: Not on file     Other Topics Concern     Not on file     Social History Narrative        FAMILY HISTORY:   Family History   Problem Relation Age of Onset     Lung Cancer Mother      Cervical Cancer Mother      Prostate Cancer Father      Lung Cancer Maternal Grandfather      Breast Cancer Sister         EXAM:Vitals: Ht 1.905 m (6' 3\")  Wt 124.3 kg (274 lb)  BMI 34.25 kg/m2  BMI= Body mass index is 34.25 " kg/(m^2).    Weight management plan: Patient was referred to their PCP to discuss a diet and exercise plan.    General appearance: Patient is alert and fully cooperative with history & exam.  No sign of distress is noted during the visit.     Psychiatric: Affect is pleasant & appropriate.  Patient appears motivated to improve health.     Respiratory: Breathing is regular & unlabored while sitting.     HEENT: Hearing is intact to spoken word.  Speech is clear.  No gross evidence of visual impairment that would impact ambulation.     Dermatologic: Skin is intact to both lower extremities without significant lesions, rash or abrasion.  No paronychia or evidence of soft tissue infection is noted.  One notes athlete's foot appearance bilateral feet as well as fungal toenail of the left great toe.     Vascular: DP & PT pulses are intact & regular bilaterally.  No significant edema or varicosities noted.  CFT and skin temperature is normal to both lower extremities.     Neurologic: Lower extremity sensation is intact to light touch.  No evidence of weakness or contracture in the lower extremities.  No evidence of neuropathy.     Musculoskeletal: Patient is ambulatory without assistive device or brace.  No gross ankle deformity noted.  No foot or ankle joint effusion is noted.  No pain on palpation over the peroneal tendon on the left foot and ankle. No surrounding erythema or edema noted. One notes a firm palpable soft tissue mass on the plantar aspect of the right and left arch. The skin moves freely over the soft tissue mass. No surrounding erythema noted.           ASSESSMENT / PLAN:     ICD-10-CM    1. Onychomycosis of left great toe B35.1    2. Tinea pedis of both feet B35.3        I have explained to Jorge Alberto  about the conditions.  We discussed the nature of the condition as well as the treatment plan and expected length of recovery.  At this time,the patient was instructed on icing, stretching, tissue massage and  support.  The patient was referred to [facility] for custom orthotics that will aid in offloading the tension forces to the soft tissues and prevent further inflammation.  The patient was fitted with a Tri-Lock ankle brace that will aid in offloading the tension forces to the soft tissues and prevent further inflammation.  The patient was prescribed topical Penlac application to the affected toenail. The patient will return in four weeks for reevaluation if the symptoms do not resolve.        Disclaimer: This note consists of symbols derived from keyboarding, dictation and/or voice recognition software. As a result, there may be errors in the script that have gone undetected. Please consider this when interpreting information found in this chart.       VALERIE Molina.[email or web address]., F.A.C.F.A.S.        Again, thank you for allowing me to participate in the care of your patient.        Sincerely,        Sriram Jefferson DPM

## 2017-12-21 ENCOUNTER — TRANSFERRED RECORDS (OUTPATIENT)
Dept: HEALTH INFORMATION MANAGEMENT | Facility: CLINIC | Age: 67
End: 2017-12-21

## 2018-02-23 RX ORDER — ASPIRIN 81 MG/1
81 TABLET ORAL DAILY
Status: ON HOLD | COMMUNITY
Start: 2007-12-21 | End: 2018-03-01

## 2018-02-27 ENCOUNTER — ANESTHESIA EVENT (OUTPATIENT)
Dept: SURGERY | Facility: CLINIC | Age: 68
DRG: 483 | End: 2018-02-27
Payer: MEDICARE

## 2018-02-27 NOTE — ANESTHESIA PREPROCEDURE EVALUATION
Anesthesia Evaluation     . Pt has had prior anesthetic. Type: General and MAC    No history of anesthetic complications          ROS/MED HX    ENT/Pulmonary:     (+)LOUIE risk factors snores loudly, hypertension, tobacco use, Past use quit 1984 packs/day  Mild Persistent asthma Treatment: Inhaler prn and Inhaler daily,  , . .    Neurologic:     (+)migraines, other neuro low back pain    Cardiovascular:     (+) Dyslipidemia, hypertension--CAD, --. : . . . :. . Previous cardiac testing       METS/Exercise Tolerance:  >4 METS   Hematologic:  - neg hematologic  ROS       Musculoskeletal:   (+) arthritis, , , other musculoskeletal- chronic LBP      GI/Hepatic:     (+) GERD Asymptomatic on medication, Other GI/Hepatic 14 shots of etoh      Renal/Genitourinary:     (+) chronic renal disease (acute kidney injury),       Endo:     (+) Obesity, Other Endocrine Disorder history of pancreatitis.      Psychiatric:  - neg psychiatric ROS       Infectious Disease:  - neg infectious disease ROS       Malignancy:      - no malignancy   Other:    - neg other ROS               Anesthesia Evaluation     . Pt has had prior anesthetic. Type: General and MAC    No history of anesthetic complications          ROS/MED HX    ENT/Pulmonary:     (+)LOUIE risk factors snores loudly, hypertension, tobacco use, Past use quit 1984 packs/day  Moderate Persistent asthma Treatment: Inhaler prn, Inhaled steroids, Inhaler daily and Nebulizer prn,  , . .    Neurologic:  - neg neurologic ROS     Cardiovascular:     (+) Dyslipidemia, hypertension----. : . . . :. . Previous cardiac testing       METS/Exercise Tolerance:  >4 METS   Hematologic:  - neg hematologic  ROS       Musculoskeletal:   (+) arthritis, , , other musculoskeletal- chronic LBP      GI/Hepatic:     (+) GERD Asymptomatic on medication, Other GI/Hepatic esophageal strictures      Renal/Genitourinary:  - ROS Renal section negative       Endo:     (+) Obesity, Other Endocrine Disorder h/o  pancreatitis-2006.      Psychiatric:  - neg psychiatric ROS       Infectious Disease:  - neg infectious disease ROS       Malignancy:      - no malignancy   Other:    - neg other ROS                 Physical Exam  Normal systems: cardiovascular, pulmonary and dental    Airway   Mallampati: I  TM distance: >3 FB  Neck ROM: full    Dental     Cardiovascular       Pulmonary                     Anesthesia Plan      History & Physical Review  History and physical reviewed and following examination; no interval change.    ASA Status:  3 .    NPO Status:  > 8 hours    Plan for Spinal and Periph. Nerve Block for postop pain Maintenance will be Balanced.    PONV prophylaxis:  Ondansetron (or other 5HT-3) and Dexamethasone or Solumedrol       Postoperative Care  Postoperative pain management:  IV analgesics and Oral pain medications.      Consents  Anesthetic plan, risks, benefits and alternatives discussed with:  Patient, Daughter/Son and Spouse..                          .    Physical Exam  Normal systems: cardiovascular, pulmonary and dental    Airway   Mallampati: II  TM distance: >3 FB  Neck ROM: full    Dental     Cardiovascular       Pulmonary                     Anesthesia Plan      History & Physical Review  History and physical reviewed and following examination; no interval change.    ASA Status:  3 .    NPO Status:  > 6 hours    Plan for General, ETT and Periph. Nerve Block for postop pain with Propofol and Intravenous induction. Maintenance will be Balanced.    PONV prophylaxis:  Ondansetron (or other 5HT-3) and Dexamethasone or Solumedrol  Additional equipment: Videolaryngoscope      Postoperative Care  Postoperative pain management:  IV analgesics, Oral pain medications and Multi-modal analgesia.      Consents  Anesthetic plan, risks, benefits and alternatives discussed with:  Patient..                          .

## 2018-02-28 ENCOUNTER — HOSPITAL ENCOUNTER (INPATIENT)
Facility: CLINIC | Age: 68
LOS: 1 days | Discharge: HOME | DRG: 483 | End: 2018-03-01
Attending: ORTHOPAEDIC SURGERY | Admitting: ORTHOPAEDIC SURGERY
Payer: MEDICARE

## 2018-02-28 ENCOUNTER — APPOINTMENT (OUTPATIENT)
Dept: GENERAL RADIOLOGY | Facility: CLINIC | Age: 68
DRG: 483 | End: 2018-02-28
Attending: ORTHOPAEDIC SURGERY
Payer: MEDICARE

## 2018-02-28 ENCOUNTER — ANESTHESIA (OUTPATIENT)
Dept: SURGERY | Facility: CLINIC | Age: 68
DRG: 483 | End: 2018-02-28
Payer: MEDICARE

## 2018-02-28 DIAGNOSIS — Z96.612 STATUS POST TOTAL REPLACEMENT OF LEFT SHOULDER: Primary | ICD-10-CM

## 2018-02-28 PROBLEM — M19.012 DJD OF LEFT SHOULDER: Status: ACTIVE | Noted: 2018-02-28

## 2018-02-28 PROCEDURE — 25000125 ZZHC RX 250: Performed by: NURSE ANESTHETIST, CERTIFIED REGISTERED

## 2018-02-28 PROCEDURE — 27210794 ZZH OR GENERAL SUPPLY STERILE: Performed by: ORTHOPAEDIC SURGERY

## 2018-02-28 PROCEDURE — 71000014 ZZH RECOVERY PHASE 1 LEVEL 2 FIRST HR: Performed by: ORTHOPAEDIC SURGERY

## 2018-02-28 PROCEDURE — 12000000 ZZH R&B MED SURG/OB

## 2018-02-28 PROCEDURE — 25000128 H RX IP 250 OP 636: Performed by: ORTHOPAEDIC SURGERY

## 2018-02-28 PROCEDURE — 37000009 ZZH ANESTHESIA TECHNICAL FEE, EACH ADDTL 15 MIN: Performed by: ORTHOPAEDIC SURGERY

## 2018-02-28 PROCEDURE — 25000128 H RX IP 250 OP 636: Performed by: NURSE ANESTHETIST, CERTIFIED REGISTERED

## 2018-02-28 PROCEDURE — A9270 NON-COVERED ITEM OR SERVICE: HCPCS | Mod: GY | Performed by: NURSE ANESTHETIST, CERTIFIED REGISTERED

## 2018-02-28 PROCEDURE — 25000132 ZZH RX MED GY IP 250 OP 250 PS 637: Mod: GY | Performed by: ORTHOPAEDIC SURGERY

## 2018-02-28 PROCEDURE — 25000564 ZZH DESFLURANE, EA 15 MIN: Performed by: ORTHOPAEDIC SURGERY

## 2018-02-28 PROCEDURE — 27810169 ZZH OR IMPLANT GENERAL: Performed by: ORTHOPAEDIC SURGERY

## 2018-02-28 PROCEDURE — 36000063 ZZH SURGERY LEVEL 4 EA 15 ADDTL MIN: Performed by: ORTHOPAEDIC SURGERY

## 2018-02-28 PROCEDURE — 40000305 ZZH STATISTIC PRE PROC ASSESS I: Performed by: ORTHOPAEDIC SURGERY

## 2018-02-28 PROCEDURE — 37000008 ZZH ANESTHESIA TECHNICAL FEE, 1ST 30 MIN: Performed by: ORTHOPAEDIC SURGERY

## 2018-02-28 PROCEDURE — 36000093 ZZH SURGERY LEVEL 4 1ST 30 MIN: Performed by: ORTHOPAEDIC SURGERY

## 2018-02-28 PROCEDURE — 0RRK0JZ REPLACEMENT OF LEFT SHOULDER JOINT WITH SYNTHETIC SUBSTITUTE, OPEN APPROACH: ICD-10-PCS | Performed by: ORTHOPAEDIC SURGERY

## 2018-02-28 PROCEDURE — C1776 JOINT DEVICE (IMPLANTABLE): HCPCS | Performed by: ORTHOPAEDIC SURGERY

## 2018-02-28 PROCEDURE — A9270 NON-COVERED ITEM OR SERVICE: HCPCS | Mod: GY | Performed by: PHYSICIAN ASSISTANT

## 2018-02-28 PROCEDURE — 25000132 ZZH RX MED GY IP 250 OP 250 PS 637: Mod: GY | Performed by: NURSE ANESTHETIST, CERTIFIED REGISTERED

## 2018-02-28 PROCEDURE — A9270 NON-COVERED ITEM OR SERVICE: HCPCS | Mod: GY | Performed by: ORTHOPAEDIC SURGERY

## 2018-02-28 PROCEDURE — 25000128 H RX IP 250 OP 636: Performed by: PHYSICIAN ASSISTANT

## 2018-02-28 PROCEDURE — 25000132 ZZH RX MED GY IP 250 OP 250 PS 637: Mod: GY | Performed by: PHYSICIAN ASSISTANT

## 2018-02-28 PROCEDURE — 27110028 ZZH OR GENERAL SUPPLY NON-STERILE: Performed by: ORTHOPAEDIC SURGERY

## 2018-02-28 PROCEDURE — 40000986 XR SHOULDER LT PORT G/E 2 VW: Mod: LT

## 2018-02-28 DEVICE — IMPLANTABLE DEVICE: Type: IMPLANTABLE DEVICE | Site: SHOULDER | Status: FUNCTIONAL

## 2018-02-28 DEVICE — BONE CEMENT SIMPLEX FULL DOSE 6191-1-001: Type: IMPLANTABLE DEVICE | Site: SHOULDER | Status: FUNCTIONAL

## 2018-02-28 RX ORDER — ACETAMINOPHEN 325 MG/1
975 TABLET ORAL ONCE
Status: DISCONTINUED | OUTPATIENT
Start: 2018-02-28 | End: 2018-02-28 | Stop reason: HOSPADM

## 2018-02-28 RX ORDER — FENTANYL CITRATE 50 UG/ML
INJECTION, SOLUTION INTRAMUSCULAR; INTRAVENOUS PRN
Status: DISCONTINUED | OUTPATIENT
Start: 2018-02-28 | End: 2018-02-28

## 2018-02-28 RX ORDER — LIDOCAINE 40 MG/G
CREAM TOPICAL
Status: DISCONTINUED | OUTPATIENT
Start: 2018-02-28 | End: 2018-03-01 | Stop reason: HOSPADM

## 2018-02-28 RX ORDER — NALOXONE HYDROCHLORIDE 0.4 MG/ML
.1-.4 INJECTION, SOLUTION INTRAMUSCULAR; INTRAVENOUS; SUBCUTANEOUS
Status: DISCONTINUED | OUTPATIENT
Start: 2018-02-28 | End: 2018-02-28 | Stop reason: HOSPADM

## 2018-02-28 RX ORDER — SODIUM CHLORIDE, SODIUM LACTATE, POTASSIUM CHLORIDE, CALCIUM CHLORIDE 600; 310; 30; 20 MG/100ML; MG/100ML; MG/100ML; MG/100ML
INJECTION, SOLUTION INTRAVENOUS CONTINUOUS PRN
Status: DISCONTINUED | OUTPATIENT
Start: 2018-02-28 | End: 2018-02-28

## 2018-02-28 RX ORDER — ONDANSETRON 2 MG/ML
4 INJECTION INTRAMUSCULAR; INTRAVENOUS EVERY 6 HOURS PRN
Status: DISCONTINUED | OUTPATIENT
Start: 2018-02-28 | End: 2018-03-01 | Stop reason: HOSPADM

## 2018-02-28 RX ORDER — ACETAMINOPHEN 325 MG/1
975 TABLET ORAL ONCE
Status: COMPLETED | OUTPATIENT
Start: 2018-02-28 | End: 2018-02-28

## 2018-02-28 RX ORDER — AMLODIPINE BESYLATE 5 MG/1
5 TABLET ORAL DAILY
Status: ON HOLD | COMMUNITY
Start: 2018-02-26 | End: 2023-06-21

## 2018-02-28 RX ORDER — HYDROMORPHONE HYDROCHLORIDE 1 MG/ML
.3-.5 INJECTION, SOLUTION INTRAMUSCULAR; INTRAVENOUS; SUBCUTANEOUS
Status: DISCONTINUED | OUTPATIENT
Start: 2018-02-28 | End: 2018-03-01 | Stop reason: HOSPADM

## 2018-02-28 RX ORDER — FENTANYL CITRATE 50 UG/ML
25-50 INJECTION, SOLUTION INTRAMUSCULAR; INTRAVENOUS
Status: DISCONTINUED | OUTPATIENT
Start: 2018-02-28 | End: 2018-02-28 | Stop reason: HOSPADM

## 2018-02-28 RX ORDER — HYDROCHLOROTHIAZIDE 25 MG/1
25 TABLET ORAL DAILY
Status: DISCONTINUED | OUTPATIENT
Start: 2018-03-01 | End: 2018-03-01 | Stop reason: HOSPADM

## 2018-02-28 RX ORDER — CELECOXIB 200 MG/1
200 CAPSULE ORAL ONCE
Status: COMPLETED | OUTPATIENT
Start: 2018-02-28 | End: 2018-02-28

## 2018-02-28 RX ORDER — ROPIVACAINE HYDROCHLORIDE 7.5 MG/ML
INJECTION, SOLUTION EPIDURAL; PERINEURAL PRN
Status: DISCONTINUED | OUTPATIENT
Start: 2018-02-28 | End: 2018-02-28

## 2018-02-28 RX ORDER — ALBUTEROL SULFATE 0.83 MG/ML
2.5 SOLUTION RESPIRATORY (INHALATION) ONCE
Status: DISCONTINUED | OUTPATIENT
Start: 2018-02-28 | End: 2018-02-28 | Stop reason: HOSPADM

## 2018-02-28 RX ORDER — ROPIVACAINE HYDROCHLORIDE 5 MG/ML
INJECTION, SOLUTION EPIDURAL; INFILTRATION; PERINEURAL PRN
Status: DISCONTINUED | OUTPATIENT
Start: 2018-02-28 | End: 2018-02-28

## 2018-02-28 RX ORDER — CEFAZOLIN SODIUM 2 G/100ML
2 INJECTION, SOLUTION INTRAVENOUS EVERY 8 HOURS
Status: COMPLETED | OUTPATIENT
Start: 2018-02-28 | End: 2018-03-01

## 2018-02-28 RX ORDER — ACETAMINOPHEN 325 MG/1
650 TABLET ORAL EVERY 4 HOURS PRN
Status: DISCONTINUED | OUTPATIENT
Start: 2018-03-03 | End: 2018-02-28

## 2018-02-28 RX ORDER — LIDOCAINE HYDROCHLORIDE 10 MG/ML
INJECTION, SOLUTION EPIDURAL; INFILTRATION; INTRACAUDAL; PERINEURAL PRN
Status: DISCONTINUED | OUTPATIENT
Start: 2018-02-28 | End: 2018-02-28

## 2018-02-28 RX ORDER — AMOXICILLIN 250 MG
1 CAPSULE ORAL 2 TIMES DAILY PRN
Status: DISCONTINUED | OUTPATIENT
Start: 2018-02-28 | End: 2018-03-01 | Stop reason: HOSPADM

## 2018-02-28 RX ORDER — ONDANSETRON 4 MG/1
4 TABLET, ORALLY DISINTEGRATING ORAL EVERY 6 HOURS PRN
Status: DISCONTINUED | OUTPATIENT
Start: 2018-02-28 | End: 2018-03-01 | Stop reason: HOSPADM

## 2018-02-28 RX ORDER — HYDROMORPHONE HYDROCHLORIDE 1 MG/ML
.3-.5 INJECTION, SOLUTION INTRAMUSCULAR; INTRAVENOUS; SUBCUTANEOUS EVERY 10 MIN PRN
Status: DISCONTINUED | OUTPATIENT
Start: 2018-02-28 | End: 2018-02-28 | Stop reason: HOSPADM

## 2018-02-28 RX ORDER — MEPERIDINE HYDROCHLORIDE 25 MG/ML
12.5 INJECTION INTRAMUSCULAR; INTRAVENOUS; SUBCUTANEOUS
Status: DISCONTINUED | OUTPATIENT
Start: 2018-02-28 | End: 2018-02-28 | Stop reason: HOSPADM

## 2018-02-28 RX ORDER — AMOXICILLIN 250 MG
2 CAPSULE ORAL 2 TIMES DAILY PRN
Status: DISCONTINUED | OUTPATIENT
Start: 2018-02-28 | End: 2018-03-01 | Stop reason: HOSPADM

## 2018-02-28 RX ORDER — ALBUTEROL SULFATE 90 UG/1
1-2 AEROSOL, METERED RESPIRATORY (INHALATION) EVERY 4 HOURS PRN
Status: DISCONTINUED | OUTPATIENT
Start: 2018-02-28 | End: 2018-03-01 | Stop reason: HOSPADM

## 2018-02-28 RX ORDER — ZOLPIDEM TARTRATE 5 MG/1
5 TABLET ORAL
Status: DISCONTINUED | OUTPATIENT
Start: 2018-03-01 | End: 2018-03-01 | Stop reason: HOSPADM

## 2018-02-28 RX ORDER — GLYCOPYRROLATE 0.2 MG/ML
INJECTION, SOLUTION INTRAMUSCULAR; INTRAVENOUS PRN
Status: DISCONTINUED | OUTPATIENT
Start: 2018-02-28 | End: 2018-02-28

## 2018-02-28 RX ORDER — HYDROXYZINE HYDROCHLORIDE 10 MG/1
10 TABLET, FILM COATED ORAL EVERY 6 HOURS PRN
Status: DISCONTINUED | OUTPATIENT
Start: 2018-02-28 | End: 2018-03-01 | Stop reason: HOSPADM

## 2018-02-28 RX ORDER — DEXAMETHASONE SODIUM PHOSPHATE 4 MG/ML
INJECTION, SOLUTION INTRA-ARTICULAR; INTRALESIONAL; INTRAMUSCULAR; INTRAVENOUS; SOFT TISSUE PRN
Status: DISCONTINUED | OUTPATIENT
Start: 2018-02-28 | End: 2018-02-28

## 2018-02-28 RX ORDER — ALBUTEROL SULFATE 0.83 MG/ML
2.5 SOLUTION RESPIRATORY (INHALATION) EVERY 4 HOURS PRN
Status: DISCONTINUED | OUTPATIENT
Start: 2018-02-28 | End: 2018-02-28 | Stop reason: HOSPADM

## 2018-02-28 RX ORDER — AMLODIPINE BESYLATE 5 MG/1
5 TABLET ORAL DAILY
Status: DISCONTINUED | OUTPATIENT
Start: 2018-03-01 | End: 2018-03-01 | Stop reason: HOSPADM

## 2018-02-28 RX ORDER — ACETAMINOPHEN 325 MG/1
650 TABLET ORAL EVERY 4 HOURS PRN
Status: DISCONTINUED | OUTPATIENT
Start: 2018-03-03 | End: 2018-03-01 | Stop reason: HOSPADM

## 2018-02-28 RX ORDER — LISINOPRIL AND HYDROCHLOROTHIAZIDE 20; 25 MG/1; MG/1
1 TABLET ORAL 2 TIMES DAILY
Status: DISCONTINUED | OUTPATIENT
Start: 2018-02-28 | End: 2018-02-28 | Stop reason: RX

## 2018-02-28 RX ORDER — NALOXONE HYDROCHLORIDE 0.4 MG/ML
.1-.4 INJECTION, SOLUTION INTRAMUSCULAR; INTRAVENOUS; SUBCUTANEOUS
Status: DISCONTINUED | OUTPATIENT
Start: 2018-02-28 | End: 2018-03-01 | Stop reason: HOSPADM

## 2018-02-28 RX ORDER — ONDANSETRON 4 MG/1
4 TABLET, ORALLY DISINTEGRATING ORAL EVERY 30 MIN PRN
Status: DISCONTINUED | OUTPATIENT
Start: 2018-02-28 | End: 2018-02-28 | Stop reason: HOSPADM

## 2018-02-28 RX ORDER — PROPOFOL 10 MG/ML
INJECTION, EMULSION INTRAVENOUS PRN
Status: DISCONTINUED | OUTPATIENT
Start: 2018-02-28 | End: 2018-02-28

## 2018-02-28 RX ORDER — ALBUTEROL SULFATE 0.83 MG/ML
2.5 SOLUTION RESPIRATORY (INHALATION) EVERY 4 HOURS PRN
Status: DISCONTINUED | OUTPATIENT
Start: 2018-02-28 | End: 2018-03-01 | Stop reason: HOSPADM

## 2018-02-28 RX ORDER — HYDRALAZINE HYDROCHLORIDE 20 MG/ML
2.5-5 INJECTION INTRAMUSCULAR; INTRAVENOUS EVERY 10 MIN PRN
Status: DISCONTINUED | OUTPATIENT
Start: 2018-02-28 | End: 2018-02-28 | Stop reason: HOSPADM

## 2018-02-28 RX ORDER — ONDANSETRON 2 MG/ML
INJECTION INTRAMUSCULAR; INTRAVENOUS PRN
Status: DISCONTINUED | OUTPATIENT
Start: 2018-02-28 | End: 2018-02-28

## 2018-02-28 RX ORDER — HYDROMORPHONE HYDROCHLORIDE 2 MG/1
2-4 TABLET ORAL
Status: DISCONTINUED | OUTPATIENT
Start: 2018-02-28 | End: 2018-03-01 | Stop reason: HOSPADM

## 2018-02-28 RX ORDER — OXYCODONE HYDROCHLORIDE 5 MG/1
5-10 TABLET ORAL EVERY 4 HOURS PRN
Status: DISCONTINUED | OUTPATIENT
Start: 2018-02-28 | End: 2018-03-01 | Stop reason: HOSPADM

## 2018-02-28 RX ORDER — ACETAMINOPHEN 325 MG/1
975 TABLET ORAL EVERY 8 HOURS
Status: DISCONTINUED | OUTPATIENT
Start: 2018-02-28 | End: 2018-03-01 | Stop reason: HOSPADM

## 2018-02-28 RX ORDER — SODIUM CHLORIDE, SODIUM LACTATE, POTASSIUM CHLORIDE, CALCIUM CHLORIDE 600; 310; 30; 20 MG/100ML; MG/100ML; MG/100ML; MG/100ML
INJECTION, SOLUTION INTRAVENOUS CONTINUOUS
Status: DISCONTINUED | OUTPATIENT
Start: 2018-02-28 | End: 2018-02-28 | Stop reason: HOSPADM

## 2018-02-28 RX ORDER — LIDOCAINE HCL/EPINEPHRINE/PF 2%-1:200K
VIAL (ML) INJECTION PRN
Status: DISCONTINUED | OUTPATIENT
Start: 2018-02-28 | End: 2018-02-28

## 2018-02-28 RX ORDER — LIDOCAINE HYDROCHLORIDE 10 MG/ML
INJECTION, SOLUTION INFILTRATION; PERINEURAL PRN
Status: DISCONTINUED | OUTPATIENT
Start: 2018-02-28 | End: 2018-02-28

## 2018-02-28 RX ORDER — DEXAMETHASONE SODIUM PHOSPHATE 4 MG/ML
4 INJECTION, SOLUTION INTRA-ARTICULAR; INTRALESIONAL; INTRAMUSCULAR; INTRAVENOUS; SOFT TISSUE EVERY 10 MIN PRN
Status: DISCONTINUED | OUTPATIENT
Start: 2018-02-28 | End: 2018-02-28 | Stop reason: HOSPADM

## 2018-02-28 RX ORDER — LISINOPRIL 20 MG/1
20 TABLET ORAL DAILY
Status: DISCONTINUED | OUTPATIENT
Start: 2018-03-01 | End: 2018-03-01 | Stop reason: HOSPADM

## 2018-02-28 RX ORDER — SODIUM CHLORIDE, SODIUM LACTATE, POTASSIUM CHLORIDE, CALCIUM CHLORIDE 600; 310; 30; 20 MG/100ML; MG/100ML; MG/100ML; MG/100ML
INJECTION, SOLUTION INTRAVENOUS CONTINUOUS
Status: DISCONTINUED | OUTPATIENT
Start: 2018-02-28 | End: 2018-03-01 | Stop reason: HOSPADM

## 2018-02-28 RX ORDER — ACETAMINOPHEN 325 MG/1
975 TABLET ORAL EVERY 8 HOURS
Status: DISCONTINUED | OUTPATIENT
Start: 2018-02-28 | End: 2018-02-28

## 2018-02-28 RX ORDER — ONDANSETRON 2 MG/ML
4 INJECTION INTRAMUSCULAR; INTRAVENOUS EVERY 30 MIN PRN
Status: DISCONTINUED | OUTPATIENT
Start: 2018-02-28 | End: 2018-02-28 | Stop reason: HOSPADM

## 2018-02-28 RX ADMIN — HYDROMORPHONE HYDROCHLORIDE 4 MG: 2 TABLET ORAL at 20:23

## 2018-02-28 RX ADMIN — FENTANYL CITRATE 50 MCG: 50 INJECTION, SOLUTION INTRAMUSCULAR; INTRAVENOUS at 11:36

## 2018-02-28 RX ADMIN — MIDAZOLAM HYDROCHLORIDE 2 MG: 1 INJECTION, SOLUTION INTRAMUSCULAR; INTRAVENOUS at 11:30

## 2018-02-28 RX ADMIN — LIDOCAINE HYDROCHLORIDE,EPINEPHRINE BITARTRATE 5 ML: 20; .005 INJECTION, SOLUTION EPIDURAL; INFILTRATION; INTRACAUDAL; PERINEURAL at 11:42

## 2018-02-28 RX ADMIN — MIDAZOLAM HYDROCHLORIDE 1 MG: 1 INJECTION, SOLUTION INTRAMUSCULAR; INTRAVENOUS at 12:30

## 2018-02-28 RX ADMIN — ROPIVACAINE HYDROCHLORIDE 15 ML: 5 INJECTION, SOLUTION EPIDURAL; INFILTRATION; PERINEURAL at 11:44

## 2018-02-28 RX ADMIN — CEFAZOLIN SODIUM 2 G: 2 INJECTION, SOLUTION INTRAVENOUS at 20:19

## 2018-02-28 RX ADMIN — SODIUM CHLORIDE, POTASSIUM CHLORIDE, SODIUM LACTATE AND CALCIUM CHLORIDE: 600; 310; 30; 20 INJECTION, SOLUTION INTRAVENOUS at 17:29

## 2018-02-28 RX ADMIN — FENTANYL CITRATE 100 MCG: 50 INJECTION, SOLUTION INTRAMUSCULAR; INTRAVENOUS at 12:28

## 2018-02-28 RX ADMIN — FENTANYL CITRATE 50 MCG: 50 INJECTION INTRAMUSCULAR; INTRAVENOUS at 15:45

## 2018-02-28 RX ADMIN — FENTANYL CITRATE 100 MCG: 50 INJECTION, SOLUTION INTRAMUSCULAR; INTRAVENOUS at 12:31

## 2018-02-28 RX ADMIN — SODIUM CHLORIDE, POTASSIUM CHLORIDE, SODIUM LACTATE AND CALCIUM CHLORIDE: 600; 310; 30; 20 INJECTION, SOLUTION INTRAVENOUS at 11:17

## 2018-02-28 RX ADMIN — MIDAZOLAM HYDROCHLORIDE 2 MG: 1 INJECTION, SOLUTION INTRAMUSCULAR; INTRAVENOUS at 12:23

## 2018-02-28 RX ADMIN — ONDANSETRON 4 MG: 2 INJECTION INTRAMUSCULAR; INTRAVENOUS at 12:28

## 2018-02-28 RX ADMIN — DEXAMETHASONE SODIUM PHOSPHATE 8 MG: 4 INJECTION, SOLUTION INTRA-ARTICULAR; INTRALESIONAL; INTRAMUSCULAR; INTRAVENOUS; SOFT TISSUE at 12:28

## 2018-02-28 RX ADMIN — DEXMEDETOMIDINE HYDROCHLORIDE 0.4 MCG/KG/HR: 100 INJECTION, SOLUTION INTRAVENOUS at 12:45

## 2018-02-28 RX ADMIN — FENTANYL CITRATE 100 MCG: 50 INJECTION, SOLUTION INTRAMUSCULAR; INTRAVENOUS at 11:30

## 2018-02-28 RX ADMIN — GLYCOPYRROLATE 0.2 MG: 0.2 INJECTION, SOLUTION INTRAMUSCULAR; INTRAVENOUS at 12:28

## 2018-02-28 RX ADMIN — CELECOXIB 200 MG: 200 CAPSULE ORAL at 10:59

## 2018-02-28 RX ADMIN — ACETAMINOPHEN 975 MG: 325 TABLET, FILM COATED ORAL at 10:59

## 2018-02-28 RX ADMIN — FENTANYL CITRATE 50 MCG: 50 INJECTION, SOLUTION INTRAMUSCULAR; INTRAVENOUS at 12:37

## 2018-02-28 RX ADMIN — MIDAZOLAM HYDROCHLORIDE 1 MG: 1 INJECTION, SOLUTION INTRAMUSCULAR; INTRAVENOUS at 15:45

## 2018-02-28 RX ADMIN — MIDAZOLAM HYDROCHLORIDE 1 MG: 1 INJECTION, SOLUTION INTRAMUSCULAR; INTRAVENOUS at 11:36

## 2018-02-28 RX ADMIN — HYDROMORPHONE HYDROCHLORIDE 2 MG: 2 TABLET ORAL at 17:30

## 2018-02-28 RX ADMIN — CEFAZOLIN 3 G: 1 INJECTION, POWDER, FOR SOLUTION INTRAVENOUS at 12:21

## 2018-02-28 RX ADMIN — PROPOFOL 200 MG: 10 INJECTION, EMULSION INTRAVENOUS at 12:28

## 2018-02-28 RX ADMIN — FENTANYL CITRATE 100 MCG: 50 INJECTION, SOLUTION INTRAMUSCULAR; INTRAVENOUS at 13:10

## 2018-02-28 RX ADMIN — ROPIVACAINE HYDROCHLORIDE 27 ML: 7.5 INJECTION, SOLUTION EPIDURAL; PERINEURAL at 16:00

## 2018-02-28 RX ADMIN — LIDOCAINE HYDROCHLORIDE 50 MG: 10 INJECTION, SOLUTION INFILTRATION; PERINEURAL at 12:28

## 2018-02-28 RX ADMIN — LIDOCAINE HYDROCHLORIDE 3 ML: 10 INJECTION, SOLUTION EPIDURAL; INFILTRATION; INTRACAUDAL; PERINEURAL at 11:39

## 2018-02-28 RX ADMIN — MIDAZOLAM HYDROCHLORIDE 1 MG: 1 INJECTION, SOLUTION INTRAMUSCULAR; INTRAVENOUS at 12:15

## 2018-02-28 RX ADMIN — ROCURONIUM BROMIDE 50 MG: 10 INJECTION INTRAVENOUS at 12:28

## 2018-02-28 RX ADMIN — MIDAZOLAM HYDROCHLORIDE 2 MG: 1 INJECTION, SOLUTION INTRAMUSCULAR; INTRAVENOUS at 12:28

## 2018-02-28 RX ADMIN — ACETAMINOPHEN 975 MG: 325 TABLET, FILM COATED ORAL at 17:30

## 2018-02-28 RX ADMIN — ROPIVACAINE HYDROCHLORIDE 15 ML: 5 INJECTION, SOLUTION EPIDURAL; INFILTRATION; PERINEURAL at 11:43

## 2018-02-28 RX ADMIN — LIDOCAINE HYDROCHLORIDE,EPINEPHRINE BITARTRATE 5 ML: 20; .005 INJECTION, SOLUTION EPIDURAL; INFILTRATION; INTRACAUDAL; PERINEURAL at 15:55

## 2018-02-28 ASSESSMENT — LIFESTYLE VARIABLES: TOBACCO_USE: 1

## 2018-02-28 NOTE — ANESTHESIA PROCEDURE NOTES
Peripheral nerve/Neuraxial procedure note : Interscalene  Pre-Procedure  Performed by  JALYN PARSON   Location: pre-op      Pre-Anesthestic Checklist: patient identified, IV checked, site marked, risks and benefits discussed, informed consent, monitors and equipment checked, pre-op evaluation, at physician/surgeon's request and post-op pain management    Timeout  Correct Patient: Yes   Correct Procedure: Yes   Correct Site: Yes   Correct Laterality: Yes   Correct Position: Yes   Site Marked: Yes   .   Procedure Documentation    .    Procedure:  left  Interscalene.  Local skin infiltrated with 3 mL of 1% lidocaine.     Ultrasound used to identify targeted nerve, plexus, or vascular marker and placed a needle adjacent to it., Ultrasound was used to visualize the spread of the anesthetic in close proximity to the above stated nerve. A permanent image is entered into the patient's record.  Patient Prep;mask, sterile gloves, chlorhexidine gluconate and isopropyl alcohol, patient draped.  Nerve Stim: Initial Level 1 mA.  Lowest motor response 0.4 mA..  Needle: insulated Needle Gauge: 20.    Needle Length (Inches) 4  Insertion Method: Single Shot.       Assessment/Narrative  .  The placement was negative for: blood aspirated, painful injection and site bleeding.  Bolus given via needle. No blood aspirated via catheter.   Secured via.   Complications: none. Test dose of 5 mL lidocaine 2% w/ 1:200,000 epinephrine at 11:42.  Test dose negative for signs of intravascular, subdural or intrathecal injection.

## 2018-02-28 NOTE — IP AVS SNAPSHOT
MRN:[id number]                      After Visit Summary   2/28/2018    Jorge Alberto Frye    MRN: [id number]           Thank you!     Thank you for choosing [facility] for your care. Our goal is always to provide you with excellent care. Hearing back from our patients is one way we can continue to improve our services. Please take a few minutes to complete the written survey that you may receive in the mail after you visit with us. Thank you!        Patient Information     Date Of Birth          1950        Designated Caregiver       Most Recent Value    Caregiver    Will someone help with your care after discharge? yes    Name of designated caregiver Jyotsna    Phone number of caregiver same as patient    Caregiver address same as patient      About your hospital stay     You were admitted on:  February 28, 2018 You last received care in the:  [facility]    You were discharged on:  March 1, 2018        Reason for your hospital stay       Left total shoulder arthroplasty                  Who to Call     For medical emergencies, please call 911.  For non-urgent questions about your medical care, please call your primary care provider or clinic, [phone]  For questions related to your surgery, please call your surgery clinic        Attending Provider     Provider Jorge Alberto White MD Orthopedics       Primary Care Provider Office Phone # Fax #    Rich Lloyd [phone]       When to contact your care team       Call your Orthopedic surgeon at [facility]  if you have any of the following: temperature greater than 100.4,  increased shortness of breath, increased drainage, increased swelling or increased pain.                  After Care Instructions     Activity       Your activity upon discharge:   Activity as tolerated, no driving until off narcotic pain medication. You may return to work when cleared by your orthopedic  surgeon or physician assistant.   You may weight bear as tolerated on your affected extremity.    Total shoulder precautions: you may remove your sling for activities of daily living. You may perform pendulum exercises 2-3x per day. You should frequently range of motion your elbow, wrist and hand. No external rotation of the shoulder beyond 20 degrees. No abduction passed 90 degrees until directed.            Diet       Follow this diet upon discharge: Orders Placed This Encounter      Regular Diet Adult              Wound care and dressings       Instructions to care for your wound at home: as directed, daily dressing changes, ice to area for comfort, keep wound clean and dry and may get incision wet in shower but do not soak or scrub. Prenio dressing to remain intact until follow up.                  Follow-up Appointments     Follow-up and recommended labs and tests       Follow up with  Jay Saleem PA-C at  [facility], within 2 weeks to evaluate after surgery and for hospital follow- up.                  Additional Services     Physical Therapy Referral       *This therapy referral will be filtered to a centralized scheduling office at [facility] and the patient will receive a call to schedule an appointment at a [facility] location most convenient for them. *     [facility] provides Physical Therapy evaluation and treatment and many specialty services across the [place] system.  If requesting a specialty program, please choose from the list below.    If you have not heard from the scheduling office within 2 business days, please call [phone] for all locations, with the exception of [place], please call [phone].  Treatment: Evaluation & Treatment  Special Instructions/Modalities: none  Special Programs: None    Please be aware that coverage of these services is subject to the terms and limitations of your health insurance plan.  Call member services  "at your health plan with any benefit or coverage questions.      **Note to Provider:  If you are referring outside of [facility] for the therapy appointment, please list the name of the location in the \"special instructions\" above, print the referral and give to the patient to schedule the appointment.                  Further instructions from your care team       1.  Follow up with Jay Saleem PA-C.  in 2 weeks for post op check and x rays as scheduled.  Call [phone] if appointment needed or questions  2.  Use pain medication as directed  3.  Keep incision clean, covered and dry until post op appointment.  You may shower and get incision wet if no drainage is present.  You may change your dressing as needed.    4.  Continue physical therapy as soon as possible.  You will need to call a therapy department of your choice to arrange future appointments.  Your order for physical therapy is included in your discharge paperwork.   5.  Take Aspirin 325 mg  daily  for 42 days for anticoagulation. You may resume your 81 mg aspirin upon completion. If you develop abdominal pain or have signs of bleeding - blood in stool or black stools, stop taking the aspirin and seek medical care.          Pending Results     No orders found for last 3 day(s).            Statement of Approval     Ordered          03/01/18 1219  I have reviewed and agree with all the recommendations and orders detailed in this document.  EFFECTIVE NOW     Approved and electronically signed by:  Shawnee Hathaway PA-C             Admission Information     Date & Time Provider Department Dept. Phone    2/28/2018 Jorge Alberto Jurado MD [facility] [phone]      Your Vitals Were     Blood Pressure Temperature Respirations Height Weight Pulse Oximetry    119/64 96.8  F (36  C) (Oral) 16 1.905 m (6' 3\") 133.4 kg (294 lb) 94%    BMI (Body Mass Index)                   36.75 kg/m2           MyChart Information     [facility] gives you " secure access to your electronic health record. If you see a primary care provider, you can also send messages to your care team and make appointments. If you have questions, please call your primary care clinic.  If you do not have a primary care provider, please call [phone] and they will assist you.        Care EveryWhere ID     This is your Care EveryWhere ID. This could be used by other organizations to access your [facility] medical records  [id number]        Equal Access to Services     MADHU GROSS : Oz salazaro Sokimberlynali, waaxda luqadaha, qaybta kaalmada lavell, julissa littlejohn. So [facility] [phone].    ATENCIÓN: Si jessicala marietta, tiene a echevarria disposición servicios gratuitos de asistencia lingüística. Llame al [phone].    We comply with applicable federal civil rights laws and [place] laws. We do not discriminate on the basis of race, color, national origin, age, disability, sex, sexual orientation, or gender identity.               Review of your medicines      START taking        Dose / Directions    acetaminophen 325 MG tablet   Commonly known as:  TYLENOL        Dose:  650 mg   Start taking on:  3/3/2018   Take 2 tablets (650 mg) by mouth every 4 hours as needed for mild pain   Quantity:  100 tablet   Refills:  0       HYDROmorphone 2 MG tablet   Commonly known as:  DILAUDID        Dose:  2-4 mg   Take 1-2 tablets (2-4 mg) by mouth every 3 hours as needed for other (pain control or improvement in physical function.)   Quantity:  60 tablet   Refills:  0       senna-docusate 8.6-50 MG per tablet   Commonly known as:  SENOKOT-S;PERICOLACE        Dose:  2 tablet   Take 2 tablets by mouth 2 times daily as needed for constipation   Quantity:  100 tablet   Refills:  0         CONTINUE these medicines which may have CHANGED, or have new prescriptions. If we are uncertain of the size of tablets/capsules you have at home, strength may be listed as something that  might have changed.        Dose / Directions    aspirin  MG EC tablet   This may have changed:    - medication strength  - how much to take  - additional instructions        Dose:  325 mg   Take 1 tablet (325 mg) by mouth daily Resume 81 mg Aspirin upon completion   Quantity:  30 tablet   Refills:  0         CONTINUE these medicines which have NOT CHANGED        Dose / Directions    * albuterol 108 (90 BASE) MCG/ACT Inhaler   Commonly known as:  PROAIR HFA/PROVENTIL HFA/VENTOLIN HFA        Dose:  1-2 puff   Inhale 1-2 puffs into the lungs every 4 hours as needed   Refills:  0       * albuterol (2.5 MG/3ML) 0.083% neb solution        Dose:  2.5 mg   Inhale 2.5 mg into the lungs every 4 hours as needed   Refills:  0       amLODIPine 5 MG tablet   Commonly known as:  NORVASC        Dose:  5 mg   Take 5 mg by mouth daily   Refills:  0       fluticasone-salmeterol 250-50 MCG/DOSE diskus inhaler   Commonly known as:  ADVAIR        Dose:  1 puff   Inhale 1 puff into the lungs 2 times daily   Refills:  0       garlic 150 MG Tabs tablet        Dose:  150 mg   Take 150 mg by mouth daily   Refills:  0       lisinopril-hydrochlorothiazide 20-25 MG per tablet   Commonly known as:  PRINZIDE/ZESTORETIC        Dose:  1 tablet   Take 1 tablet by mouth 2 times daily   Refills:  0       MULTIPLE VITAMINS PO        Dose:  1 tablet   Take 1 tablet by mouth daily   Refills:  0       OMEGA-3 FISH OIL PO        Dose:  2 g   Take 2 g by mouth daily   Refills:  0       PRILOSEC OTC PO        Dose:  20 mg   Take 20 mg by mouth daily   Refills:  0       SILDENAFIL CITRATE PO        Dose:  20-40 mg   Take 20-40 mg by mouth daily as needed   Refills:  0       VITAMIN D (CHOLECALCIFEROL) PO        Dose:  5000 Units   Take 5,000 Units by mouth daily   Refills:  0       * Notice:  This list has 2 medication(s) that are the same as other medications prescribed for you. Read the directions carefully, and ask your doctor or other care provider  to review them with you.         Where to get your medicines      These medications were sent to [facility] [place], [place] - [address]  [address] [address]     Phone:  [phone]     acetaminophen 325 MG tablet    senna-docusate 8.6-50 MG per tablet         Some of these will need a paper prescription and others can be bought over the counter. Ask your nurse if you have questions.     Bring a paper prescription for each of these medications     HYDROmorphone 2 MG tablet       You don't need a prescription for these medications     aspirin  MG EC tablet                Protect others around you: Learn how to safely use, store and throw away your medicines at www.disposemymeds.org.        Information about OPIOIDS     PRESCRIPTION OPIOIDS: WHAT YOU NEED TO KNOW    Prescription opioids can be used to help relieve moderate to severe pain and are often prescribed following a surgery or injury, or for certain health conditions. These medications can be an important part of treatment but also come with serious risks. It is important to work with your health care provider to make sure you are getting the safest, most effective care.    WHAT ARE THE RISKS AND SIDE EFFECTS OF OPIOID USE?  Prescription opioids carry serious risks of addiction and overdose, especially with prolonged use. An opioid overdose, often marked by slowed breathing can cause sudden death. The use of prescription opioids can have a number of side effects as well, even when taken as directed:      Tolerance - meaning you might need to take more of a medication for the same pain relief    Physical dependence - meaning you have symptoms of withdrawal when a medication is stopped    Increased sensitivity to pain    Constipation    Nausea, vomiting, and dry mouth    Sleepiness and dizziness    Confusion    Depression    Low levels of testosterone that can result in lower sex drive, energy, and strength    Itching  and sweating    RISKS ARE GREATER WITH:    History of drug misuse, substance use disorder, or overdose    Mental health conditions (such as depression or anxiety)    Sleep apnea    Older age (65 years or older)    Pregnancy    Avoid alcohol while taking prescription opioids.   Also, unless specifically advised by your health care provider, medications to avoid include:    Benzodiazepines (such as Xanax or Valium)    Muscle relaxants (such as Soma or Flexeril)    Hypnotics (such as Ambien or Lunesta)    Other prescription opioids    KNOW YOUR OPTIONS:  Talk to your health care provider about ways to manage your pain that do not involve prescription opioids. Some of these options may actually work better and have fewer risks and side effects:    Pain relievers such as acetaminophen, ibuprofen, and naproxen    Some medications that are also used for depression or seizures    Physical therapy and exercise    Cognitive behavioral therapy, a psychological, goal-directed approach, in which patients learn how to modify physical, behavioral, and emotional triggers of pain and stress    IF YOU ARE PRESCRIBED OPIOIDS FOR PAIN:    Never take opioids in greater amounts or more often than prescribed    Follow up with your primary health care provider and work together to create a plan on how to manage your pain.    Talk about ways to help manage your pain that do not involve prescription opioids    Talk about all concerns and side effects    Help prevent misuse and abuse    Never sell or share prescription opioids    Never use another person's prescription opioids    Store prescription opioids in a secure place and out of reach of others (this may include visitors, children, friends, and family)    Visit www.cdc.gov/drugoverdose to learn about risks of opioid abuse and overdose    If you believe you may be struggling with addiction, tell your health care provider and ask for guidance or call [facility]'s National Helpline at  5-443-943-HELP    LEARN MORE / www.cdc.gov/drugoverdose/prescribing/guideline.html    Safely dispose of unused prescription opioids: Find your local drug take-back programs and more information about the importance of safe disposal at www.doseofreality.[place].gov             Medication List: This is a list of all your medications and when to take them. Check marks below indicate your daily home schedule. Keep this list as a reference.      Medications           Morning Afternoon Evening Bedtime As Needed    acetaminophen 325 MG tablet   Commonly known as:  TYLENOL   Take 2 tablets (650 mg) by mouth every 4 hours as needed for mild pain   Start taking on:  3/3/2018   Last time this was given:  975 mg on 3/1/2018  8:05 AM                                * albuterol 108 (90 BASE) MCG/ACT Inhaler   Commonly known as:  PROAIR HFA/PROVENTIL HFA/VENTOLIN HFA   Inhale 1-2 puffs into the lungs every 4 hours as needed                                * albuterol (2.5 MG/3ML) 0.083% neb solution   Inhale 2.5 mg into the lungs every 4 hours as needed                                amLODIPine 5 MG tablet   Commonly known as:  NORVASC   Take 5 mg by mouth daily   Last time this was given:  5 mg on 3/1/2018  8:06 AM                                aspirin  MG EC tablet   Take 1 tablet (325 mg) by mouth daily Resume 81 mg Aspirin upon completion                                fluticasone-salmeterol 250-50 MCG/DOSE diskus inhaler   Commonly known as:  ADVAIR   Inhale 1 puff into the lungs 2 times daily                                garlic 150 MG Tabs tablet   Take 150 mg by mouth daily                                HYDROmorphone 2 MG tablet   Commonly known as:  DILAUDID   Take 1-2 tablets (2-4 mg) by mouth every 3 hours as needed for other (pain control or improvement in physical function.)   Last time this was given:  4 mg on 3/1/2018 12:10 PM                                lisinopril-hydrochlorothiazide 20-25 MG per tablet    Commonly known as:  PRINZIDE/ZESTORETIC   Take 1 tablet by mouth 2 times daily                                MULTIPLE VITAMINS PO   Take 1 tablet by mouth daily                                OMEGA-3 FISH OIL PO   Take 2 g by mouth daily                                PRILOSEC OTC PO   Take 20 mg by mouth daily                                senna-docusate 8.6-50 MG per tablet   Commonly known as:  SENOKOT-S;PERICOLACE   Take 2 tablets by mouth 2 times daily as needed for constipation                                SILDENAFIL CITRATE PO   Take 20-40 mg by mouth daily as needed                                VITAMIN D (CHOLECALCIFEROL) PO   Take 5,000 Units by mouth daily                                * Notice:  This list has 2 medication(s) that are the same as other medications prescribed for you. Read the directions carefully, and ask your doctor or other care provider to review them with you.

## 2018-02-28 NOTE — PROGRESS NOTES
"[place] [facility] ADMISSION NOTE    Patient admitted to room 2315 at approximately 1700 via cart from surgery. Patient was accompanied by transport tech.     Verbal SBAR report received from Bradley prior to patient arrival.     Patient ambulated to bed with one assist. Patient alert and oriented X 3. The patient is not having any pain.  . Admission vital signs: Blood pressure 128/68, temperature 97.5  F (36.4  C), temperature source Oral, resp. rate 16, height 1.905 m (6' 3\"), weight 133.4 kg (294 lb), SpO2 92 %. Patient was oriented to plan of care, call light, bed controls, tv, telephone, bathroom and visiting hours.     The following safety risks were identified during admission: fall. Yellow risk band applied: YES.     Francisca Murrell    "

## 2018-02-28 NOTE — ANESTHESIA PROCEDURE NOTES
Peripheral nerve/Neuraxial procedure note : interscalene  Pre-Procedure  Performed by  FERMIN GRUBBS   Location: pre-op    Procedure Times:2/28/2018 3:45 PM and 2/28/2018 4:04 PM  Pre-Anesthestic Checklist: patient identified, IV checked, site marked, risks and benefits discussed, informed consent, monitors and equipment checked, pre-op evaluation, at physician/surgeon's request and post-op pain management    Timeout  Correct Patient: Yes   Correct Procedure: Yes   Correct Site: Yes   Correct Laterality: Yes   Correct Position: Yes   Site Marked: Yes   .   Procedure Documentation    .    Procedure:  right  Interscalene.  Local skin infiltrated with 1 mL of 1% lidocaine.     Ultrasound used to identify targeted nerve, plexus, or vascular marker and placed a needle adjacent to it., Ultrasound was used to visualize the spread of the anesthetic in close proximity to the above stated nerve. A permanent image is entered into the patient's record.  Patient Prep;mask, sterile gloves, chlorhexidine gluconate and isopropyl alcohol, patient draped.  Nerve Stim: Initial Level 1 mA.  Lowest motor response 0.45 mA..  Needle: insulated, short bevel Needle Gauge: 22.    Needle Length (Inches) 2  Insertion Method: Single Shot.   . .  Catheter threaded easily  .  .   .    Assessment/Narrative  Paresthesias: No.  Injection made incrementally with aspirations every 5 mL..  The placement was negative for: blood aspirated, painful injection and site bleeding.  Bolus given via needle. No blood aspirated via catheter.   Secured via.   Complications: none. Test dose of 3 mL lidocaine 1.5% w/ 1:200,000 epinephrine at 15:59.  Test dose negative for signs of intravascular, subdural or intrathecal injection.

## 2018-02-28 NOTE — OR NURSING
Interscalene block placed.  Patient tolerated well.  Vital signs stable, Call light within reach.  Family at bedside.

## 2018-02-28 NOTE — IP AVS SNAPSHOT
[facility]    [address] [address]    Phone:  [phone]    Fax:  [phone]                                       After Visit Summary   2/28/2018    Jorge Alberto Frye    MRN: [id number]           After Visit Summary Signature Page     I have received my discharge instructions, and my questions have been answered. I have discussed any challenges I see with this plan with the nurse or doctor.    ..........................................................................................................................................  Patient/Patient Representative Signature      ..........................................................................................................................................  Patient Representative Print Name and Relationship to Patient    ..................................................               ................................................  Date                                            Time    ..........................................................................................................................................  Reviewed by Signature/Title    ...................................................              ..............................................  Date                                                            Time

## 2018-02-28 NOTE — BRIEF OP NOTE
[facility]  Brief Operative Note      Pre-operative diagnosis: Degenerative joint disease left shoulder   Post-operative diagnosis: Same   Procedure: Total shoulder arthroplasty (Left)   Surgeon: Jorge Alberto Jurado MD     Assistant(s): Jay Saleem PA-C   Anesthesia: General endotracheal anesthesia   Estimated blood loss: 200 ml               Drains: None   Specimens: None       Findings: See full dictated operative note for details   Complications: None                   Comments: See dictated operative report for full details     Condition: Stable   Weight bearing status: Weight bearing as tolerated   Activity: Activity as tolerated  Patient may move about with assist as indicated or with supervision   Anticoagulation plan:                  mg daily  for 30 days  Follow up plan                           Follow up in 2 week(s)

## 2018-02-28 NOTE — OR NURSING
Pt reports pain and is able to move left shoulder, arm, and hand.  NINA Brittany here to redo interscalene block.

## 2018-02-28 NOTE — ANESTHESIA CARE TRANSFER NOTE
Patient: Jorge Alberto Frye    Procedure(s):  Left total shoulder arthroplasty, possible reverse - Wound Class: I-Clean    Diagnosis: Degenerative joint disease left shoulder  Diagnosis Additional Information: No value filed.    Anesthesia Type:   General, ETT, Periph. Nerve Block for postop pain     Note:  Airway :Face Mask  Patient transferred to:PACU  Handoff Report: Identifed the Patient, Identified the Reponsible Provider, Reviewed the pertinent medical history, Discussed the surgical course, Reviewed Intra-OP anesthesia mangement and issues during anesthesia, Set expectations for post-procedure period and Allowed opportunity for questions and acknowledgement of understanding      Vitals: (Last set prior to Anesthesia Care Transfer)    CRNA VITALS  2/28/2018 1500 - 2/28/2018 1600      2/28/2018             Pulse: 81    SpO2: 96 %                Electronically Signed By: MAME Martinez CRNA  February 28, 2018  4:14 PM

## 2018-03-01 ENCOUNTER — APPOINTMENT (OUTPATIENT)
Dept: OCCUPATIONAL THERAPY | Facility: CLINIC | Age: 68
DRG: 483 | End: 2018-03-01
Attending: ORTHOPAEDIC SURGERY
Payer: MEDICARE

## 2018-03-01 VITALS
DIASTOLIC BLOOD PRESSURE: 64 MMHG | WEIGHT: 294 LBS | OXYGEN SATURATION: 94 % | SYSTOLIC BLOOD PRESSURE: 119 MMHG | BODY MASS INDEX: 36.56 KG/M2 | TEMPERATURE: 96.8 F | HEIGHT: 75 IN | RESPIRATION RATE: 16 BRPM

## 2018-03-01 PROBLEM — I25.10 CAD (CORONARY ARTERY DISEASE): Chronic | Status: ACTIVE | Noted: 2017-05-31

## 2018-03-01 LAB
GLUCOSE BLDC GLUCOMTR-MCNC: 124 MG/DL (ref 70–99)
HGB BLD-MCNC: 12.8 G/DL (ref 13.3–17.7)

## 2018-03-01 PROCEDURE — 40000133 ZZH STATISTIC OT WARD VISIT

## 2018-03-01 PROCEDURE — 36415 COLL VENOUS BLD VENIPUNCTURE: CPT | Performed by: ORTHOPAEDIC SURGERY

## 2018-03-01 PROCEDURE — 40000193 ZZH STATISTIC PT WARD VISIT

## 2018-03-01 PROCEDURE — 99231 SBSQ HOSP IP/OBS SF/LOW 25: CPT | Performed by: PHYSICIAN ASSISTANT

## 2018-03-01 PROCEDURE — 97165 OT EVAL LOW COMPLEX 30 MIN: CPT | Mod: GO

## 2018-03-01 PROCEDURE — 97535 SELF CARE MNGMENT TRAINING: CPT | Mod: GO

## 2018-03-01 PROCEDURE — 25000132 ZZH RX MED GY IP 250 OP 250 PS 637: Mod: GY | Performed by: ORTHOPAEDIC SURGERY

## 2018-03-01 PROCEDURE — A9270 NON-COVERED ITEM OR SERVICE: HCPCS | Mod: GY | Performed by: ORTHOPAEDIC SURGERY

## 2018-03-01 PROCEDURE — 97110 THERAPEUTIC EXERCISES: CPT | Mod: GP

## 2018-03-01 PROCEDURE — 97161 PT EVAL LOW COMPLEX 20 MIN: CPT | Mod: GP

## 2018-03-01 PROCEDURE — 25000128 H RX IP 250 OP 636: Performed by: ORTHOPAEDIC SURGERY

## 2018-03-01 PROCEDURE — 00000146 ZZHCL STATISTIC GLUCOSE BY METER IP

## 2018-03-01 PROCEDURE — 85018 HEMOGLOBIN: CPT | Performed by: ORTHOPAEDIC SURGERY

## 2018-03-01 RX ORDER — HYDROMORPHONE HYDROCHLORIDE 2 MG/1
2-4 TABLET ORAL
Qty: 60 TABLET | Refills: 0 | Status: SHIPPED | OUTPATIENT
Start: 2018-03-01 | End: 2023-06-16

## 2018-03-01 RX ORDER — ACETAMINOPHEN 325 MG/1
650 TABLET ORAL EVERY 4 HOURS PRN
Qty: 100 TABLET | Refills: 0 | Status: ON HOLD | OUTPATIENT
Start: 2018-03-03 | End: 2023-06-22

## 2018-03-01 RX ORDER — AMOXICILLIN 250 MG
2 CAPSULE ORAL 2 TIMES DAILY PRN
Qty: 100 TABLET | Refills: 0 | Status: SHIPPED | OUTPATIENT
Start: 2018-03-01 | End: 2023-06-16

## 2018-03-01 RX ADMIN — ACETAMINOPHEN 975 MG: 325 TABLET, FILM COATED ORAL at 00:22

## 2018-03-01 RX ADMIN — ACETAMINOPHEN 975 MG: 325 TABLET, FILM COATED ORAL at 08:05

## 2018-03-01 RX ADMIN — FLUTICASONE FUROATE AND VILANTEROL TRIFENATATE 1 PUFF: 100; 25 POWDER RESPIRATORY (INHALATION) at 08:06

## 2018-03-01 RX ADMIN — LISINOPRIL 20 MG: 20 TABLET ORAL at 08:06

## 2018-03-01 RX ADMIN — HYDROMORPHONE HYDROCHLORIDE 4 MG: 2 TABLET ORAL at 08:39

## 2018-03-01 RX ADMIN — HYDROMORPHONE HYDROCHLORIDE 4 MG: 2 TABLET ORAL at 05:56

## 2018-03-01 RX ADMIN — AMLODIPINE BESYLATE 5 MG: 5 TABLET ORAL at 08:06

## 2018-03-01 RX ADMIN — HYDROMORPHONE HYDROCHLORIDE 4 MG: 2 TABLET ORAL at 12:10

## 2018-03-01 RX ADMIN — HYDROCHLOROTHIAZIDE 25 MG: 25 TABLET ORAL at 08:06

## 2018-03-01 RX ADMIN — HYDROMORPHONE HYDROCHLORIDE 4 MG: 2 TABLET ORAL at 00:01

## 2018-03-01 RX ADMIN — OMEPRAZOLE 20 MG: 20 CAPSULE, DELAYED RELEASE ORAL at 05:56

## 2018-03-01 RX ADMIN — HYDROMORPHONE HYDROCHLORIDE 4 MG: 2 TABLET ORAL at 03:18

## 2018-03-01 RX ADMIN — CEFAZOLIN SODIUM 2 G: 2 INJECTION, SOLUTION INTRAVENOUS at 03:19

## 2018-03-01 ASSESSMENT — PAIN DESCRIPTION - DESCRIPTORS
DESCRIPTORS: ACHING;DISCOMFORT;DULL
DESCRIPTORS: ACHING;DISCOMFORT;DULL

## 2018-03-01 NOTE — ANESTHESIA POSTPROCEDURE EVALUATION
Patient: Jorge Alberto Frye    Procedure(s):  Left total shoulder arthroplasty, possible reverse - Wound Class: I-Clean    Diagnosis:Degenerative joint disease left shoulder  Diagnosis Additional Information: No value filed.    Anesthesia Type:  General, ETT, Periph. Nerve Block for postop pain    Note:  Anesthesia Post Evaluation    Patient location during evaluation: Floor  Patient participation: Able to participate in evaluation but full recovery from regional anesthesia has not yet ocurrred but is anticipated to occur within 48 hours  Level of consciousness: awake and alert  Pain management: adequate  Airway patency: patent  Cardiovascular status: acceptable and hemodynamically stable  Respiratory status: acceptable and room air  Hydration status: acceptable  PONV: none     Anesthetic complications: None          Last vitals:  Vitals:    02/28/18 1745 02/28/18 1800 02/28/18 1804   BP: 133/80 139/74 142/75   Resp:      Temp:      SpO2:  94% 94%         Electronically Signed By: MAME Oviedo CRNA  February 28, 2018  6:11 PM

## 2018-03-01 NOTE — PLAN OF CARE
Problem: Shoulder Arthroplasty (Adult)  Goal: Signs and Symptoms of Listed Potential Problems Will be Absent, Minimized or Managed (Shoulder Arthroplasty)  Signs and symptoms of listed potential problems will be absent, minimized or managed by discharge/transition of care (reference Shoulder Arthroplasty (Adult) CPG).   Outcome: No Change  Requested to be awaken for pain pills, wants to stay on top of pain,  Dsg. dry and Intact, ice to incision continous thru the night.  IV Saline locked this am, moving about in room and up to BR without incident.  Is able to use other hand to balance self when up.   Noted capno alarm going off for drop in saturations patient admits to having sleep apnea at home.  Plan of care continues.

## 2018-03-01 NOTE — PLAN OF CARE
Problem: Patient Care Overview  Goal: Plan of Care/Patient Progress Review  Discharge Planner OT   Patient plan for discharge: Home with assist    Current status: Educated pt on ADLs within precautions. Able to complete full body dressing with Min A- wife present and able to assist. Handout provided on techniques.     Barriers to return to prior living situation: None    Recommendations for discharge: Home with assist from wife as needed.            Entered by: Margo Stahl 03/01/2018 12:32 PM      Occupational Therapy Discharge Summary    Reason for therapy discharge:    All goals and outcomes met, no further needs identified.    Progress towards therapy goal(s). See goals on Care Plan in [facility] electronic health record for goal details.  Goals met    Therapy recommendation(s):    No further OT needs identified. Pt plans on OP PT

## 2018-03-01 NOTE — PROGRESS NOTES
"[facility] Progress Note      Post-operative Day: 1 Day Post-Op    Procedure(s):  Left total shoulder arthroplasty, possible reverse - Wound Class: I-Clean      Subjective:    Pain: minimal  aching to L shoulder. Denies shooting pain, parasthesias, numbness and weakness.   denies Chest pain, SOB, O2 required: None  denies nausea/ emesis  denies lightheadedness, dizziness and weakness  BM -, passing gas +. Urinating well, Luke in place: no.       Objective:  Blood pressure 119/64, temperature 96.8  F (36  C), temperature source Oral, resp. rate 16, height 1.905 m (6' 3\"), weight 133.4 kg (294 lb), SpO2 94 %.    Patient Vitals for the past 24 hrs:   BP Temp Temp src Heart Rate Resp SpO2   03/01/18 0700 119/64 96.8  F (36  C) Oral 71 16 94 %   03/01/18 0300 122/54 98.3  F (36.8  C) - 75 22 93 %   02/28/18 2317 138/70 97.9  F (36.6  C) Oral 87 26 92 %   02/28/18 1930 142/74 98.3  F (36.8  C) Oral 89 28 93 %   02/28/18 1900 133/66 - - 86 - -   02/28/18 1830 122/83 - - 76 - 94 %   02/28/18 1804 142/75 - - 73 - 94 %   02/28/18 1800 139/74 - - 75 - 94 %   02/28/18 1745 133/80 - - - - -   02/28/18 1730 126/71 - - - - -   02/28/18 1715 128/69 - - - - -   02/28/18 1704 128/68 97.5  F (36.4  C) Oral - 16 92 %   02/28/18 1700 128/68 - - - - -   02/28/18 1630 112/74 98  F (36.7  C) Oral 70 16 94 %   02/28/18 1615 105/71 - - 75 16 94 %   02/28/18 1600 108/76 - - 81 16 93 %   02/28/18 1545 (!) 103/91 - - 79 16 93 %   02/28/18 1535 112/75 98.1  F (36.7  C) Oral 80 12 97 %   02/28/18 1215 122/62 - - 80 20 95 %       Wt Readings from Last 4 Encounters:   02/28/18 133.4 kg (294 lb)   08/03/17 124.3 kg (274 lb)   05/31/17 122.5 kg (270 lb)   11/17/16 125.2 kg (276 lb 0.3 oz)     General: Alert and orientated. No apparent distress. Non-labored breathing. Appropriate affect.   MSK: L shoulder: dressing Clean, dry, and intact. Skin intact, no ecchymosis, no erythema. nontender to palpation to incision site. ROM appropriate " for post op. Distal neurovascularly intact. Compartments soft and non-tender. No calf pain. Homans negative. PF/DF and EHL intact.       Pertinent Labs   Lab Results: personally reviewed.     Recent Labs   Lab Test  03/01/18   0644  06/05/17   0635  06/04/17   1329  06/03/17   0905  06/02/17   0638   05/31/17   1038   11/17/16   0815   INR   --    --    --    --    --    --   1.04   --   0.95   HGB  12.8*  11.8*   --   12.7*  12.5*  12.4*   < >  15.3   < >  14.9   HCT   --   35.7*   --   38.3*  37.1*   --   45.8   --   43.7   MCV   --   91   --   92  92   --   90   --   90   PLT   --   254   --   212  189   --   249   --   236   NA   --    --    --   138   --    --    --    --    --    CRP   --   111.0*  116.0*   --   79.6*   --    --    --    --     < > = values in this interval not displayed.         Procedure(s):  Left total shoulder arthroplasty, possible reverse - Wound Class: I-Clean  Plan: Anticoagulation protocol:  mg daily  x 30  days            Pain medications:  dilaudid and tylenol            Weight bearing status:  WBAT, may remove sling for hygiene and pendulums            Dressing Change: dry dressing change with gauze. Prineo to remain intact until follow up.             Disposition:  Home today             Follow up: 2 week with ARGENIS            Continue cares and rehabilitation     Report completed by:  Shawnee Hathaway PA-C  Date: 3/1/2018  Time: 12:13 PM

## 2018-03-01 NOTE — DISCHARGE SUMMARY
[facility] Discharge Summary                                  [facility]     AZEB MARCANO [id number]   Age: 67 year old  PCP: Rich Lloyd, [phone] 1950     Date of Admission:  2/28/2018  Date of Discharge::  3/1/2018  Discharge Physician:  Shawnee Hathaway    Code status:  Full Code    Admission Information:  Admission Diagnosis:  Degenerative joint disease left shoulder  DJD of left shoulder    Post-Operative Day: 1 Day Post-Op     Reason for admission:  The patient was admitted for the following:Procedure(s) (LRB):  ARTHROPLASTY SHOULDER (Left)    Principal Problem:    Status post total replacement of left shoulder  Active Problems:    Gastroesophageal reflux disease    Benign essential hypertension    Hyperlipidemia    Low back pain    Primary osteoarthritis of both knees    Asthma    CAD (coronary artery disease)    DJD of left shoulder      Allergies:  Review of patient's allergies indicates no known allergies.    Following the procedure noted above the patient was transferred to the post-op floor and started on:    Therapy:  physical therapy and occupational therapy  Anticoagulation Plan:  mg daily  for 30 days  Pain Management: dilaudid and tylenol  Weight bearing status: Weight bearing as tolerated, remove sling for ADLs and pendulums, no ER beyond 90, AAROM and PROM in flexion plane is okay, no abduction beyond 90.     The patient was followed and co-managed by the hospitalist service during the inpatient treatment course  Complications:  None  Consultations:  None     Pertinent Labs   Lab Results: personally reviewed.     Recent Labs   Lab Test  03/01/18   0644  06/05/17   0635  06/04/17   1329  06/03/17   0905  06/02/17   0638   05/31/17   1038   11/17/16   0815   INR   --    --    --    --    --    --   1.04   --   0.95   HGB  12.8*  11.8*   --   12.7*  12.5*  12.4*   < >  15.3   < >  14.9   HCT   --   35.7*   --   38.3*  37.1*   --   45.8   --    43.7   MCV   --   91   --   92  92   --   90   --   90   PLT   --   254   --   212  189   --   249   --   236   NA   --    --    --   138   --    --    --    --    --    CRP   --   111.0*  116.0*   --   79.6*   --    --    --    --     < > = values in this interval not displayed.          Discharge Information:  Condition at discharge: Stable  Discharge destination:  Discharged to home     Medications at discharge:  Current Discharge Medication List      START taking these medications    Details   HYDROmorphone (DILAUDID) 2 MG tablet Take 1-2 tablets (2-4 mg) by mouth every 3 hours as needed for other (pain control or improvement in physical function.)  Qty: 60 tablet, Refills: 0    Associated Diagnoses: Status post total replacement of left shoulder      acetaminophen (TYLENOL) 325 MG tablet Take 2 tablets (650 mg) by mouth every 4 hours as needed for mild pain  Qty: 100 tablet, Refills: 0    Associated Diagnoses: Status post total replacement of left shoulder      senna-docusate (SENOKOT-S;PERICOLACE) 8.6-50 MG per tablet Take 2 tablets by mouth 2 times daily as needed for constipation  Qty: 100 tablet, Refills: 0    Associated Diagnoses: Status post total replacement of left shoulder         CONTINUE these medications which have CHANGED    Details   aspirin  MG EC tablet Take 1 tablet (325 mg) by mouth daily Resume 81 mg Aspirin upon completion  Qty: 30 tablet, Refills: 0    Associated Diagnoses: Status post total replacement of left shoulder         CONTINUE these medications which have NOT CHANGED    Details   amLODIPine (NORVASC) 5 MG tablet Take 5 mg by mouth daily       VITAMIN D, CHOLECALCIFEROL, PO Take 5,000 Units by mouth daily      fluticasone-salmeterol (ADVAIR) 250-50 MCG/DOSE diskus inhaler Inhale 1 puff into the lungs 2 times daily      lisinopril-hydrochlorothiazide (PRINZIDE,ZESTORETIC) 20-25 MG per tablet Take 1 tablet by mouth 2 times daily      Omeprazole Magnesium (PRILOSEC OTC PO) Take  20 mg by mouth daily       albuterol (PROAIR HFA, PROVENTIL HFA, VENTOLIN HFA) 108 (90 BASE) MCG/ACT inhaler Inhale 1-2 puffs into the lungs every 4 hours as needed       garlic 150 MG TABS Take 150 mg by mouth daily      Omega-3 Fatty Acids (OMEGA-3 FISH OIL PO) Take 2 g by mouth daily       MULTIPLE VITAMINS PO Take 1 tablet by mouth daily       SILDENAFIL CITRATE PO Take 20-40 mg by mouth daily as needed       albuterol (2.5 MG/3ML) 0.083% neb solution Inhale 2.5 mg into the lungs every 4 hours as needed                        Follow-Up Care:  Patient should be seen in the office in 10-14 days by the Orthopedic Surgeon/Physician Assistant.  Call [phone] for appointment or questions.    Shawnee Hathaway

## 2018-03-01 NOTE — DISCHARGE INSTRUCTIONS
1.  Follow up with Jay Saleem PA-C.  in 2 weeks for post op check and x rays as scheduled.  Call [phone] if appointment needed or questions  2.  Use pain medication as directed  3.  Keep incision clean, covered and dry until post op appointment.  You may shower and get incision wet if no drainage is present.  You may change your dressing as needed.    4.  Continue physical therapy as soon as possible.  You will need to call a therapy department of your choice to arrange future appointments.  Your order for physical therapy is included in your discharge paperwork.   5.  Take Aspirin 325 mg  daily  for 42 days for anticoagulation. You may resume your 81 mg aspirin upon completion. If you develop abdominal pain or have signs of bleeding - blood in stool or black stools, stop taking the aspirin and seek medical care.

## 2018-03-01 NOTE — PROGRESS NOTES
03/01/18 1200   Quick Adds   Type of Visit Initial Occupational Therapy Evaluation   Living Environment   Lives With spouse   Living Arrangements house   Home Accessibility stairs to enter home   Number of Stairs to Enter Home 3   Stair Railings at Home none   Transportation Available family or friend will provide   Self-Care   Dominant Hand right   Functional Level Prior   Ambulation 0-->independent   Transferring 0-->independent   Toileting 0-->independent   Bathing 0-->independent   Dressing 0-->independent   Eating 0-->independent   Communication 0-->understands/communicates without difficulty   Swallowing 0-->swallows foods/liquids without difficulty   Cognition 0 - no cognition issues reported   Fall history within last six months no   Which of the above functional risks had a recent onset or change? dressing   General Information   Onset of Illness/Injury or Date of Surgery - Date 02/28/18   Patient/Family Goals Statement To return home.    Additional Occupational Profile Info/Pertinent History of Current Problem s/p L TSA   Weight-Bearing Status - LUE nonweight-bearing   Weight-Bearing Status - RUE full weight-bearing   Weight-Bearing Status - LLE full weight-bearing   Weight-Bearing Status - RLE full weight-bearing   General Observations Precautions: 0* external rotation, 0-90* shoulder FF as tolerated.    General Info Comments Hx of R TSA done 11/2016   Cognitive Status Examination   Orientation orientation to person, place and time   Pain Assessment   Patient Currently in Pain Yes, see Vital Sign flowsheet   Range of Motion (ROM)   ROM Comment R UE ROM: WNL. L UE: NT- see PT note for PROM details.    Transfer Skill: Bed to Chair/Chair to Bed   Level of [place]: Bed to Chair independent   Transfer Skill: Sit to Stand   Level of [place]: Sit/Stand independent   Transfer Skill: Toilet Transfer   Level of [place]: Toilet independent   Instrumental Activities of Daily Living (IADL)   IADL  "Comments Pt states wife can assist as needed.    Activities of Daily Living Analysis   Impairments Contributing to Impaired Activities of Daily Living post surgical precautions   General Therapy Interventions   Planned Therapy Interventions ADL retraining   Clinical Impression   Criteria for Skilled Therapeutic Interventions Met yes, treatment indicated   OT Diagnosis decreased independence with ADLs.    Influenced by the following impairments educate pt on techniques for ADLs within shoulder precautions   Assessment of Occupational Performance 1-3 Performance Deficits   Identified Performance Deficits dressing.    Clinical Decision Making (Complexity) Low complexity   Therapy Frequency daily   Predicted Duration of Therapy Intervention (days/wks) 1x treat   Anticipated Discharge Disposition Home with Assist   Risks and Benefits of Treatment have been explained. Yes   Patient, Family & other staff in agreement with plan of care Yes   [facility] AM-PAC  \"6 Clicks\" Daily Activity Inpatient Short Form   1. Putting on and taking off regular lower body clothing? 3 - A Little   2. Bathing (including washing, rinsing, drying)? 3 - A Little   3. Toileting, which includes using toilet, bedpan or urinal? 4 - None   4. Putting on and taking off regular upper body clothing? 3 - A Little   5. Taking care of personal grooming such as brushing teeth? 4 - None   6. Eating meals? 4 - None   Daily Activity Raw Score (Score out of 24.Lower scores equate to lower levels of function) 21   Total Evaluation Time   Total Evaluation Time (Minutes) 8     "

## 2018-03-01 NOTE — PROGRESS NOTES
Physical Therapy Evaluation and Discharge Summary       03/01/18 1000   Quick Adds   Type of Visit Initial PT Evaluation   Living Environment   Lives With spouse   Living Arrangements house   Home Accessibility stairs to enter home   Number of Stairs to Enter Home 3   Stair Railings at Home none   Transportation Available family or friend will provide   Functional Level Prior   Ambulation 0-->independent   Transferring 0-->independent   Toileting 0-->independent   Bathing 0-->independent   Dressing 0-->independent   Eating 0-->independent   Communication 0-->understands/communicates without difficulty   Swallowing 0-->swallows foods/liquids without difficulty   Cognition 0 - no cognition issues reported   Fall history within last six months no   Which of the above functional risks had a recent onset or change? none   General Information   Onset of Illness/Injury or Date of Surgery - Date 02/28/18   Referring Physician Dr Jurado   Patient/Family Goals Statement wants to return home to independent living   Pertinent History of Current Problem (include personal factors and/or comorbidities that impact the POC) Pt admitted for L TSA   Weight-Bearing Status - LUE nonweight-bearing   Cognitive Status Examination   Orientation orientation to person, place and time   Level of Consciousness alert   Follows Commands and Answers Questions 100% of the time   Personal Safety and Judgment intact   Pain Assessment   Patient Currently in Pain Yes, see Vital Sign flowsheet  (L shld pain rated at 2/10)   Range of Motion (ROM)   ROM Comment WFL except L shld very limited due to pain   Strength   Strength Comments WFL except L UE NT due to surgery   Transfer Skills   Transfer Comments sit <> stand independently   Gait   Gait Comments Amb without AD independently   Balance   Balance no deficits were identified   General Therapy Interventions   Planned Therapy Interventions ROM;home program guidelines   Intervention Comments PROM L shld  "all directions x 10, AROM: elbow flex, pron/sup, wrist flex and ext, open/close fist x 10, Codman's all directions x 10. Issued written HEP to be done 2x/day. Pt understood and agreed.   Clinical Impression   Criteria for Skilled Therapeutic Intervention yes, treatment indicated   PT Diagnosis L TSA   Influenced by the following impairments pain and weakness   Functional limitations due to impairments reaching, lifting, carrying, dressing, grooming   Clinical Presentation Stable/Uncomplicated   Clinical Presentation Rationale clinical judgement   Clinical Decision Making (Complexity) Low complexity   Therapy Frequency` daily   Predicted Duration of Therapy Intervention (days/wks) 1 visit only   Anticipated Discharge Disposition Home with Outpatient Therapy   Risk & Benefits of therapy have been explained Yes   Patient, Family & other staff in agreement with plan of care Yes   Clinical Impression Comments Pt independent with HEP. No further need for IP PT at this time. Pt will resume OP PT after d/c from hospital.   [facility] [employer] TM \"6 Clicks\"   2016, Trustees of [facility], under license to [employer].  All rights reserved.   6 Clicks Short Forms Basic Mobility Inpatient Short Form   [facility] AM-PAC  \"6 Clicks\" V.2 Basic Mobility Inpatient Short Form   1. Turning from your back to your side while in a flat bed without using bedrails? 3 - A Little   2. Moving from lying on your back to sitting on the side of a flat bed without using bedrails? 4 - None   3. Moving to and from a bed to a chair (including a wheelchair)? 4 - None   4. Standing up from a chair using your arms (e.g., wheelchair, or bedside chair)? 4 - None   5. To walk in hospital room? 4 - None   6. Climbing 3-5 steps with a railing? 4 - None   Basic Mobility Raw Score (Score out of 24.Lower scores equate to lower levels of function) 23   Total Evaluation Time   Total Evaluation Time (Minutes) 15     Caro De Luna PT      "

## 2018-03-01 NOTE — PROGRESS NOTES
"SPIRITUAL HEALTH SERVICES  SPIRITUAL ASSESSMENT Progress Note  [facility] [place] - Med/Surg    PRIMARY FOCUS:     Assessment of emotional/spiritual/Evangelical distress    Support for coping    ILLNESS CIRCUMSTANCES:   Reviewed documentation. Reflective conversation shared with Jorge Alberto Frye which integrated elements of illness and family narratives.     Context of Serious Illness/Symptom(s) - Shoulder repair    Resources for Support - Wife, Jyotsna mentioned     DISTRESS:     Emotional/Existential/Relational Distress - David stated he was doing well and hoping to go home today    Spiritual/Quaker Distress - None; he talked about his many trips to [place] working with a [place] ministry through the [employer]     Social/Cultural/Economic Distress - None addressed    SPIRITUAL/Jain COPING:     Yazidism/Angie - Quaker    Spiritual Practice(s) - Prayer and involvement in international ministries    Emotional/Existential/Relational Connections - David stated he had started out as an ; then 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  His first wife  of cancer.  He and Jyotsna have been  4+ years.  He stated they travel extensively and are looking forward to a cruise on the [place] this summer.    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    PLAN: No follow up visit because of possible discharge today.    Gilbert Springer M.A., [facility]  Staff   [facility]  Office: [phone]  Cell: [phone]  Pager [phone]    "

## 2018-03-01 NOTE — PROGRESS NOTES
"[facility] Medicine Progress Note  Date of Service: 03/01/2018    Assessment & Plan   Jorge Alberto Frye is a 67 year old male who presented on 2/28/2018 for scheduled Procedure(s):  ARTHROPLASTY SHOULDER by Jorge Alberto Jurado MD and is being followed by the hospital medicine service for co-management of acute and/or chronic perioperative medical problems.      S/p Procedure(s):  ARTHROPLASTY SHOULDER   1 Day Post-Op    Pain well managed. Hg 12.8.   - pain control, wound cares, physical therapy, occupational therapy and DVT prophylaxis per orthopedic surgery service    Hypertension   Chronic and stable. Blood pressures reviewed, stable.   - continue PTA amlodipine, lisinopril,     Gastroesophageal reflux disease  Chronic and stable.  - continue PTA omeprazole    Hyperlipidemia  Chronic and stable. Just prescribed rosuvastatin, has not started.  - start home rosuvastatin     Asthma  Managed at home with Advair, albuterol  - continue home Advair, albuterol as needed    Coronary artery disease  Coronary angiogram 2/18: \"mild to moderate non-obstructive coronary artery disease\". Managed at home with ACE, statin, ASA  - continue home ACE  - start home statin, newly prescribed by PCP  - hold home ASA while on 325 mg    DVT Prophylaxis: as per orthopedic surgery service - Defer to primary service  Code Status: Full Code    Lines: PIV   Luke catheter: None.    Discussion: Medically, the patient appears stable. VSS.    Disposition: Anticipate discharge today or tomorrow, per primary service     Attestation:  I have reviewed today's vital signs, notes, medications, labs and imaging.  Total time: 15 minutes    This patient was discussed with Dr. Eze Burton. Plan as above.    Anh Martínez PA-C  Hospital Medicine      Interval History   Patient was seen this afternoon. States he is doing well and ready to go home.    Pain has been well managed.  He has been doing his exercises with " "therapy.  Good appetite.  Slept \"ok\" overnight, looking forward to sleeping in own bed.  No BM, is passing gas. Voiding regularly.     Discharge plan/DVT plan    Denies HA, lightheadedness, dizziness, fever, chills, chest pain, palpitations, SOB, cough, wheezes, abdominal pain, N/V/D, numbness or tingling in bilateral upper extremities.    Physical Exam   Temp:  [96.8  F (36  C)-98.3  F (36.8  C)] 96.8  F (36  C)  Heart Rate:  [70-89] 71  Resp:  [12-28] 16  BP: (103-142)/(54-91) 119/64  SpO2:  [92 %-97 %] 94 %    Weights:   Vitals:    02/28/18 1017   Weight: 133.4 kg (294 lb)    Body mass index is 36.75 kg/(m^2).    General: Appears well, sitting up in bed. Alert and orientated. Pleasant and cooperative. NAD. Non-toxic. Appears stated age.   CV: Regular rate, normal rhythm. Radial pulses are 2+ bilaterally. Capillary refill is < 2 seconds in bilateral upper extremities. Trace lower extremity edema to ankle  Respiratory: No accessory muscle usage. Clear to auscultation bilaterally. No wheezes, crackles or rhonchi.   GI: Soft, non-tender, non-distended. Bowel sounds are normoactive in a quadrants.  Skin: Warm, dry, intact. Did not assess incision, arm in sling.  Musculoskeletal: Muscle tone is appropriate. Moves all extremities freely with limited range of motion left upper extremity due to pain and bandaged.  Neuro: Sensation to light touch of bilateral upper extremities is grossly intact.     Data     Recent Labs  Lab 03/01/18  0644   HGB 12.8*         Recent Labs  Lab 03/01/18  0643   *        Unresulted Labs Ordered in the Past 30 Days of this Admission     No orders found for last 61 day(s).           Imaging  Recent Results (from the past 24 hour(s))   XR Shoulder Left Port G/E 2 Views    Narrative    XR SHOULDER LT PORT G/E 2 VW 2/28/2018 4:19 PM    HISTORY: Postop.    COMPARISON: None.      Impression    IMPRESSION: Status post left shoulder arthroplasty. Hardware is  intact. Alignment is anatomic. " Degenerative changes of the  acromioclavicular joint.    LARISA LARA MD        I reviewed all new labs and imaging results over the last 24 hours. I personally reviewed no images or EKG's today.    Medications     lactated ringers Stopped (03/01/18 0557)       amLODIPine  5 mg Oral Daily     sodium chloride (PF)  3 mL Intracatheter Q8H     acetaminophen  975 mg Oral Q8H     fluticasone-vilanterol  1 puff Inhalation Daily     lisinopril  20 mg Oral Daily    And     hydrochlorothiazide  25 mg Oral Daily     omeprazole  20 mg Oral QAM AC     I have discussed patient with Dr. Eze Burton.    Anh Martínez, PA-[facility] Medicine

## 2018-03-01 NOTE — OP NOTE
Procedure Date: 02/28/2018      PREOPERATIVE DIAGNOSIS:  Left shoulder degenerative arthritis.      POSTOPERATIVE DIAGNOSIS:  Left shoulder degenerative arthritis.      PROCEDURE PERFORMED:  Left total shoulder arthroplasty using a Sunman size 13 cemented humeral component with a 22 x 56 mm eccentric humeral head and a 52 mm pegged X3 glenoid cemented.      SURGEON:  Jorge Alberto Jurado MD      ASSISTANT:  Jay Saleem PA-C      ANESTHESIA:  General.      COMPLICATIONS:  None.      DESCRIPTION OF PROCEDURE:  After being informed of the risks, benefits and alternatives of the procedure, the patient desired to proceed.  An interscalene block had been administered preoperatively and he was given 2 gm of Ancef.  He was placed under general anesthesia, positioned in a low beach chair position.  His left upper extremity was prepped and draped in a manner appropriate for the procedure.  Timeout verification step was completed.      A deltopectoral approach was taken to the shoulder.  The cephalic vein was preserved and brought laterally with the deltoid.  The clavipectoral fascia was identified, conjoined tendon and a Cobell retractor was positioned.  Attention was directed towards elevating the subscap off the lesser tuberosity.  It was tagged with three #1 Ethibond stitches.  The axillary nerve was protected with a Jaquan as the joint capsule was divided sharply.  Great care was taken to protect neurovascular structures and the axillary nerve throughout these procedures.  The subscap was mobilized and retracted behind the Cobell retractor.  Attention was directed towards performing the proximal humeral osteotomy.  The Sunman guide was utilized planning a 30-degree retroverted cut at 45 degrees.  This cut was completed and the head component was removed.  Substantial osteophyte was demonstrated.  Several osteochondral loose bodies were then removed.  The joint was inspected for additional loose bodies, which were not  appreciated.  The attention was then directed towards the glenoid.  A Fukuda retractor was placed.  A circumferential capsulotomy and labral excision were completed exposing the glenoid.  The ring retractor was placed posteriorly and a bat ear retractor was placed anteriorly providing excellent exposure of the glenoid.  The centering guide was then positioned.  A centering hole was drilled.  Reaming was then carried out, which corrected version.  Additional peg holes were created using the [employer] guide and the trial glenoid component was secured after using the sound.  Additional osteophytes were removed circumferentially from the glenoid.  The final glenoid component was then cemented.  One batch of Simplex cement was utilized.  The cement was introduced into the drill holes with a syringe.  The posterior glenoid component was prepared with cement.  The component was fully seated and we did not proceed until the cement had hardened and the excess cement was removed.  Any retained osteophytes or loose bodies were removed from the joint.  The joint was copiously irrigated and attention was directed towards the humerus.  There were extensive large osteophytes present at the osteochondral junction.  The osteophytes were resected using a curved osteotome and rongeur.  Again, care was taken to protect the neurovascular structures during these procedures.  The canal finder was then utilized with broaching up to size 13 femoral component.  The canal could be reamed up to 13 mm, but not beyond.  A 13 mm trial component was utilized and a trial reduction was carried out.  90 degrees of internal rotation and 90 degrees of abduction were demonstrated and approximately 50% anterior and posterior mobility was demonstrated with the 22 mm thickness eccentric head.  It was elected to cement the humeral component.  A size 13 cemented component was placed using 1 batch of Simplex cement.  Once the cement had hardened and any excess  cement was removed, trial reductions were once again carried out and similar mobility and stability were demonstrated.  The humeral head was secured.  The joint was copiously irrigated and the subscapularis repair was completed using #2 FiberWire placed through drill holes x 2 and the #1 Ethibond stitches.  A stable repair was felt to have been achieved as well as closure of the rotator interval.  The wounds were copiously irrigated.  The proximal cephalic vein was ligated.  The deep tissues were closed with 2-0 Vicryl and skin was closed with running 3-0 strategy fixed and Prineo tape.  Sterile dressing and a simple sling were utilized.  The patient tolerated the procedure well, returned to PAR in stable condition.  Estimated blood loss 120 mL.         AZEB TOM MD             D: 2018   T: 2018   MT: MARSHALL      Name:     AZEB MARCANO   MRN:      [id number]        Account:        [id number]   :      1950           Procedure Date: 2018      Document: [id number]

## 2018-03-05 ENCOUNTER — HOSPITAL ENCOUNTER (OUTPATIENT)
Dept: PHYSICAL THERAPY | Facility: CLINIC | Age: 68
Setting detail: THERAPIES SERIES
End: 2018-03-05
Attending: PHYSICIAN ASSISTANT
Payer: MEDICARE

## 2018-03-05 PROCEDURE — G8984 CARRY CURRENT STATUS: HCPCS | Mod: GP,CM | Performed by: PHYSICAL THERAPIST

## 2018-03-05 PROCEDURE — 97110 THERAPEUTIC EXERCISES: CPT | Mod: GP | Performed by: PHYSICAL THERAPIST

## 2018-03-05 PROCEDURE — 97161 PT EVAL LOW COMPLEX 20 MIN: CPT | Mod: GP | Performed by: PHYSICAL THERAPIST

## 2018-03-05 PROCEDURE — 40000718 ZZHC STATISTIC PT DEPARTMENT ORTHO VISIT: Performed by: PHYSICAL THERAPIST

## 2018-03-05 PROCEDURE — G8985 CARRY GOAL STATUS: HCPCS | Mod: GP,CI | Performed by: PHYSICAL THERAPIST

## 2018-03-05 NOTE — PROGRESS NOTES
PHYSICAL THERAPY INITIAL EVALUATION  03/05/18 0900   General Information   Type of Visit Initial OP Ortho PT Evaluation   Start of Care Date 03/05/18   Referring Physician Shawnee Hathaway PA-C/Dr. Jurado   Patient/Family Goals Statement be able to get the arm moving and going as soon as he can   Orders Evaluate and Treat   Date of Order 03/01/18   Insurance Type Medicare   Medical Diagnosis Status post total replacement of left shoulder    Surgical/Medical history reviewed Yes   Precautions/Limitations no known precautions/limitations   General Information Comments PMH: asthma, smoking, HTN, R TSA, TKA   Body Part(s)   Body Part(s) Shoulder   Presentation and Etiology   Pertinent history of current problem (include personal factors and/or comorbidities that impact the POC) TSA last wednesday 2/28, stayed in hospital overnight. Took pain meds for about 3 days. Not taking any pain relievers. Little ache with sitting, more with moving it. Sleeping in the bed pretty well.     Impairments A. Pain   Functional Limitations perform activities of daily living;perform desired leisure / sports activities   Symptom Location L shoulder   How/Where did it occur Other  (post-surgical)   Onset date of current episode/exacerbation 02/28/18   Chronicity New   Pain rating (0-10 point scale) Best (/10);Worst (/10)   Best (/10) 2   Worst (/10) 4   Pain quality A. Sharp;C. Aching   Frequency of pain/symptoms A. Constant   Pain/symptoms exacerbated by (moving arm)   Pain/symptoms eased by H. Cold   Progression of symptoms since onset: Improved   Prior Level of Function   Prior Level of Function-Mobility indpendent   Prior Level of Function-ADLs independent   Current Level of Function   Patient role/employment history F. Retired   Fall Risk Screen   Fall screen completed by PT   Have you fallen 2 or more times in the past year? No   Have you fallen and had an injury in the past year? No   Is patient a fall risk? No   Functional Scales    Functional Scales Other   Other Scales  SPADI: 97.78%   Shoulder Objective Findings   Side (if bilateral, select both right and left) Left   Integumentary  incision covered with the bandage, no drainage noted through the bandage and area around it is bruised but no redness or warmth    Shoulder ROM Comment elbow AROM full flexion and extension with mild pain   Cervical Screen (ROM, quadrant) mild limited R rotation and R SB     Left Shoulder Flexion PROM 45   Left Shoulder Abduction PROM 30   Left Shoulder ER PROM 0   Left Shoulder IR PROM to stomach   Palpation incisional tenderness   Planned Therapy Interventions   Planned Therapy Interventions ROM;strengthening;stretching;neuromuscular re-education;manual therapy;joint mobilization   Planned Modality Interventions   Planned Modality Interventions Cryotherapy   Clinical Impression   Criteria for Skilled Therapeutic Interventions Met yes, treatment indicated   PT Diagnosis L shoulder decreased ROM, strength, pain, swelling s/p L TSA   Influenced by the following impairments pain, swelling, decreased ROM, decreased strength   Functional limitations due to impairments reaching, lifting, ADLs   Clinical Presentation Stable/Uncomplicated   Clinical Presentation Rationale minimal pain, uncomplicated surgical course and hospital stay   Clinical Decision Making (Complexity) Low complexity   Therapy Frequency 2 times/Week   Predicted Duration of Therapy Intervention (days/wks) 2-4 weeks, 1x week for 4 weeks, 1x every other week for 4 weeks    Risk & Benefits of therapy have been explained Yes   Patient, Family & other staff in agreement with plan of care Yes   Clinical Impression Comments s/p L TSA on 2/28/18 by Dr. Jurado   Education Assessment   Preferred Learning Style Listening;Demonstration;Pictures/video   Barriers to Learning No barriers   ORTHO GOALS   PT Ortho Eval Goals 1;2;3;4   Ortho Goal 1   Goal Identifier 1   Goal Description Patient will be able to  perform dressing with minimal difficulty.   Target Date 04/16/18   Ortho Goal 2   Goal Identifier 2   Goal Description Patient will be able to reach top shelf with minimal difficulty and pain 2/10 or less.   Target Date 04/30/18   Ortho Goal 3   Goal Identifier 3   Goal Description Patient will be able to lift a gallon of milk and place on middle or top shelf with minimal difficulty and pain 2/10 or less.   Target Date 05/28/18   Ortho Goal 4   Goal Identifier 4   Goal Description Patient will be independent and compliant with HEP to aid functional recovery.   Target Date 05/28/18   Total Evaluation Time   Total Evaluation Time 15   Therapy Certification   Certification date from 03/05/18   Certification date to 05/28/18   Medical Diagnosis Status post total replacement of left shoulder        Please contact me with any questions or concerns.  Thank you for your referral.    Flower Loredo, PT, DPT, OCS  Physical Therapist, Orthopedic Certified Specialist  [facility]  [phone]

## 2018-03-05 NOTE — PROGRESS NOTES
[facility]          OUTPATIENT PHYSICAL THERAPY ORTHOPEDIC EVALUATION  PLAN OF TREATMENT FOR OUTPATIENT REHABILITATION  (COMPLETE FOR INITIAL CLAIMS ONLY)  Patient's Last Name, First Name, M.I.  YOB: 1950  Jorge Alberto Frye    Provider s Name:  [facility]   Medical Record No.  [id number]   Start of Care Date:  03/05/18   Onset Date:  02/28/18   Type:     _X__PT   ___OT   ___SLP Medical Diagnosis:  Status post total replacement of left shoulder      PT Diagnosis:  L shoulder decreased ROM, strength, pain, swelling s/p L TSA   Visits from SOC:  1      _________________________________________________________________________________  Plan of Treatment/Functional Goals:  ROM, strengthening, stretching, neuromuscular re-education, manual therapy, joint mobilization     Cryotherapy     Goals  Goal Identifier: 1  Goal Description: Patient will be able to perform dressing with minimal difficulty.  Target Date: 04/16/18    Goal Identifier: 2  Goal Description: Patient will be able to reach top shelf with minimal difficulty and pain 2/10 or less.  Target Date: 04/30/18    Goal Identifier: 3  Goal Description: Patient will be able to lift a gallon of milk and place on middle or top shelf with minimal difficulty and pain 2/10 or less.  Target Date: 05/28/18    Goal Identifier: 4  Goal Description: Patient will be independent and compliant with HEP to aid functional recovery.  Target Date: 05/28/18             Therapy Frequency:  2 times/Week  Predicted Duration of Therapy Intervention:  2-4 weeks, 1x week for 4 weeks, 1x every other week for 4 weeks         Flower Loredo, PT                 I CERTIFY THE NEED FOR THESE SERVICES FURNISHED UNDER        THIS PLAN OF TREATMENT AND WHILE UNDER MY CARE .             Physician Signature               Date    X_____________________________________________________                             Certification Date From:   03/05/18   Certification Date To:  05/28/18    Referring Provider:  Shawnee Hathaway PA-C/Dr. Jurado    Initial Assessment        See Epic Evaluation Start of Care Date: 03/05/18

## 2018-03-09 ENCOUNTER — HOSPITAL ENCOUNTER (OUTPATIENT)
Dept: PHYSICAL THERAPY | Facility: CLINIC | Age: 68
Setting detail: THERAPIES SERIES
End: 2018-03-09
Attending: PHYSICIAN ASSISTANT
Payer: MEDICARE

## 2018-03-09 PROCEDURE — 97110 THERAPEUTIC EXERCISES: CPT | Mod: GP | Performed by: PHYSICAL THERAPIST

## 2018-03-09 PROCEDURE — 40000718 ZZHC STATISTIC PT DEPARTMENT ORTHO VISIT: Performed by: PHYSICAL THERAPIST

## 2018-03-12 ENCOUNTER — HOSPITAL ENCOUNTER (OUTPATIENT)
Dept: PHYSICAL THERAPY | Facility: CLINIC | Age: 68
Setting detail: THERAPIES SERIES
End: 2018-03-12
Attending: PHYSICIAN ASSISTANT
Payer: MEDICARE

## 2018-03-12 PROCEDURE — 97110 THERAPEUTIC EXERCISES: CPT | Mod: GP | Performed by: PHYSICAL THERAPIST

## 2018-03-12 PROCEDURE — 40000718 ZZHC STATISTIC PT DEPARTMENT ORTHO VISIT: Performed by: PHYSICAL THERAPIST

## 2018-03-12 NOTE — PROGRESS NOTES
Outpatient Physical Therapy Progress Note     Patient: Jorge Alberto Frye  : 1950    Beginning/End Dates of Reporting Period:  3/5/2018 to 3/12/2018  Total visits: 3    Referring Provider: Dr. Peacock    Therapy Diagnosis: s/p L TSA     Client Self Report: Pain 1-2/10 at most.     Objective Measurements:  Objective Measure: L shoulder PROM  Details: Flexion 120*, ER(0) 10*       Goals:  Goal Identifier 1   Goal Description Patient will be able to perform dressing with minimal difficulty.   Target Date 18   Date Met      Progress:     Goal Identifier 2   Goal Description Patient will be able to reach top shelf with minimal difficulty and pain 2/10 or less.   Target Date 18   Date Met      Progress:     Goal Identifier 3   Goal Description Patient will be able to lift a gallon of milk and place on middle or top shelf with minimal difficulty and pain 2/10 or less.   Target Date 18   Date Met      Progress:     Goal Identifier 4   Goal Description Patient will be independent and compliant with HEP to aid functional recovery.   Target Date 18   Date Met      Progress:     Progress Toward Goals:   Progress this reporting period: Patient progressing excellently with PROM. Having minimal pain. Compliant with sling use.          Plan:  Continue therapy per current plan of care. Progress per TSA protocol.    Discharge:  No        Please contact me with any questions or concerns.  Thank you for your referral.    Flower Loredo, PT, DPT, OCS  Physical Therapist, Orthopedic Certified Specialist  [facility]  [phone]

## 2018-03-14 ENCOUNTER — HOSPITAL ENCOUNTER (OUTPATIENT)
Dept: PHYSICAL THERAPY | Facility: CLINIC | Age: 68
Setting detail: THERAPIES SERIES
End: 2018-03-14
Attending: PHYSICIAN ASSISTANT
Payer: MEDICARE

## 2018-03-14 PROCEDURE — 97110 THERAPEUTIC EXERCISES: CPT | Mod: GP | Performed by: PHYSICAL THERAPIST

## 2018-03-14 PROCEDURE — 97140 MANUAL THERAPY 1/> REGIONS: CPT | Mod: GP | Performed by: PHYSICAL THERAPIST

## 2018-03-14 PROCEDURE — 40000718 ZZHC STATISTIC PT DEPARTMENT ORTHO VISIT: Performed by: PHYSICAL THERAPIST

## 2018-03-22 ENCOUNTER — HOSPITAL ENCOUNTER (OUTPATIENT)
Dept: PHYSICAL THERAPY | Facility: CLINIC | Age: 68
Setting detail: THERAPIES SERIES
End: 2018-03-22
Attending: PHYSICIAN ASSISTANT
Payer: MEDICARE

## 2018-03-22 PROCEDURE — 40000718 ZZHC STATISTIC PT DEPARTMENT ORTHO VISIT: Performed by: PHYSICAL THERAPIST

## 2018-03-22 PROCEDURE — 97110 THERAPEUTIC EXERCISES: CPT | Mod: GP | Performed by: PHYSICAL THERAPIST

## 2018-03-26 ENCOUNTER — HOSPITAL ENCOUNTER (OUTPATIENT)
Dept: PHYSICAL THERAPY | Facility: CLINIC | Age: 68
Setting detail: THERAPIES SERIES
End: 2018-03-26
Attending: PHYSICIAN ASSISTANT
Payer: MEDICARE

## 2018-03-26 PROCEDURE — 40000718 ZZHC STATISTIC PT DEPARTMENT ORTHO VISIT: Performed by: PHYSICAL THERAPIST

## 2018-03-26 PROCEDURE — 97110 THERAPEUTIC EXERCISES: CPT | Mod: GP | Performed by: PHYSICAL THERAPIST

## 2018-03-28 ENCOUNTER — HOSPITAL ENCOUNTER (OUTPATIENT)
Dept: PHYSICAL THERAPY | Facility: CLINIC | Age: 68
Setting detail: THERAPIES SERIES
End: 2018-03-28
Attending: PHYSICIAN ASSISTANT
Payer: MEDICARE

## 2018-03-28 PROCEDURE — 97110 THERAPEUTIC EXERCISES: CPT | Mod: GP | Performed by: PHYSICAL THERAPIST

## 2018-03-28 PROCEDURE — 40000718 ZZHC STATISTIC PT DEPARTMENT ORTHO VISIT: Performed by: PHYSICAL THERAPIST

## 2018-04-02 ENCOUNTER — HOSPITAL ENCOUNTER (OUTPATIENT)
Dept: PHYSICAL THERAPY | Facility: CLINIC | Age: 68
Setting detail: THERAPIES SERIES
End: 2018-04-02
Attending: PHYSICIAN ASSISTANT
Payer: MEDICARE

## 2018-04-02 PROCEDURE — 40000718 ZZHC STATISTIC PT DEPARTMENT ORTHO VISIT: Performed by: PHYSICAL THERAPIST

## 2018-04-02 PROCEDURE — 97110 THERAPEUTIC EXERCISES: CPT | Mod: GP | Performed by: PHYSICAL THERAPIST

## 2018-04-05 ENCOUNTER — HOSPITAL ENCOUNTER (OUTPATIENT)
Dept: PHYSICAL THERAPY | Facility: CLINIC | Age: 68
Setting detail: THERAPIES SERIES
End: 2018-04-05
Attending: PHYSICIAN ASSISTANT
Payer: MEDICARE

## 2018-04-05 PROCEDURE — 97110 THERAPEUTIC EXERCISES: CPT | Mod: GP | Performed by: PHYSICAL THERAPIST

## 2018-04-05 PROCEDURE — 40000718 ZZHC STATISTIC PT DEPARTMENT ORTHO VISIT: Performed by: PHYSICAL THERAPIST

## 2018-04-05 NOTE — PROGRESS NOTES
Outpatient Physical Therapy Progress Note     Patient: Jorge Alberto Frye  : 1950    Beginning/End Dates of Reporting Period:  3/5/2018 to 2018    Referring Provider: Dr. Rhiannon Gomes Diagnosis: s/p L TSA     Client Self Report: No pain at rest.     Objective Measurements:  Objective Measure: L shoulder PROM  Details: Flexion 148*, ER(0) 30*, IR(30) 52*    Objective Measure: L shoulder AAROM  Details: Flexion 148* (supine)     Goals:  Goal Identifier 1   Goal Description Patient will be able to perform dressing with minimal difficulty.   Target Date 18   Date Met  18   Progress:     Goal Identifier 2   Goal Description Patient will be able to reach top shelf with minimal difficulty and pain 2/10 or less.   Target Date 18   Date Met   (no active motion yet)   Progress:     Goal Identifier 3   Goal Description Patient will be able to lift a gallon of milk and place on middle or top shelf with minimal difficulty and pain 2/10 or less.   Target Date 18   Date Met   (Not appropriate yet)   Progress:     Goal Identifier 4   Goal Description Patient will be independent and compliant with HEP to aid functional recovery.   Target Date 18   Date Met   (compliant, continuing to progress )   Progress:     Progress Toward Goals:   Progress this reporting period: Patient making excellent progress with no pain at rest and minimal pain with daily activities. Passive motion is also progressing well (see above).       Plan:  Continue therapy per current plan of care.    Discharge:  No      Please contact me with any questions or concerns.  Thank you for your referral.    Flower Loredo, PT, DPT, OCS  Physical Therapist, Orthopedic Certified Specialist  [facility]  [phone]

## 2018-04-11 ENCOUNTER — HOSPITAL ENCOUNTER (OUTPATIENT)
Dept: PHYSICAL THERAPY | Facility: CLINIC | Age: 68
Setting detail: THERAPIES SERIES
End: 2018-04-11
Attending: PHYSICIAN ASSISTANT
Payer: MEDICARE

## 2018-04-11 PROCEDURE — 40000718 ZZHC STATISTIC PT DEPARTMENT ORTHO VISIT: Performed by: PHYSICAL THERAPIST

## 2018-04-11 PROCEDURE — G8984 CARRY CURRENT STATUS: HCPCS | Mod: GP,CJ | Performed by: PHYSICAL THERAPIST

## 2018-04-11 PROCEDURE — G8985 CARRY GOAL STATUS: HCPCS | Mod: GP,CI | Performed by: PHYSICAL THERAPIST

## 2018-04-11 PROCEDURE — 97110 THERAPEUTIC EXERCISES: CPT | Mod: GP | Performed by: PHYSICAL THERAPIST

## 2018-04-11 NOTE — PROGRESS NOTES
PHYSICAL THERAPY PROGRESS NOTE  04/11/18 0900   Signing Clinician's Name / Credentials   Signing clinician's name / credentials Flower Loredo, PT, DPT, OCS   Session Number   Session Number 10 medicare   Progress Note/Recertification   Progress Note Due Date 04/16/18   Recertification Due Date 05/28/18   Carry: Carrying, Moving & Handling Objects   Carry Current Status,  (eval/re-eval & every progress note) CJ: 20-39% impairment   Current Carry Modifier Rationale clinical judgement, ROM, SPADI   Carry Goal,  (eval/re-eval, every progress note, & discharge) CI: 1-19% impairment   Adult Goals   PT Ortho Eval Goals 1;2;3;4   Ortho Goal 1   Goal Identifier 1   Goal Description Patient will be able to perform dressing with minimal difficulty.   Target Date 04/16/18   Date Met 04/05/18   Ortho Goal 2   Goal Identifier 2   Goal Description Patient will be able to reach top shelf with minimal difficulty and pain 2/10 or less.   Target Date 04/30/18   Date Met (no active motion yet)   Ortho Goal 3   Goal Identifier 3   Goal Description Patient will be able to lift a gallon of milk and place on middle or top shelf with minimal difficulty and pain 2/10 or less.   Target Date 05/28/18   Date Met (Not appropriate yet)   Ortho Goal 4   Goal Identifier 4   Goal Description Patient will be independent and compliant with HEP to aid functional recovery.   Target Date 05/28/18   Date Met (compliant, continuing to progress )   Subjective Report   Subjective Report No pain at rest. Will be leaving for 1 month for vacation and would like to know what to self progress himself to.    Objective Measures   Objective Measures Objective Measure 1;Objective Measure 2   Objective Measure 1   Objective Measure L shoulder PROM   Details Flexion 148*, ER(0) 45*   Treatment Interventions   Interventions Therapeutic Procedure/Exercise;Manual Therapy   Therapeutic Procedure/exercise   Minutes 30   Skilled Intervention ROM to improve  function   Patient Response minimal pain   Treatment Detail UBE level 2 x 3 min (fwd/back) pulleys into flexion x 15 and scaption x 15. modified stick ex to: wall slides into flexion, doorway ER, IR with towel 10' holds x 10 to be done daily. instr pt in strength program and how to self progress with band colors: rowing RTB x 20, lat pull downs RTB x 20, resisted ER YTB x 20, resisted IR YTB x 20 - strength ex to be done every other day.   Education   Learner Patient   Readiness Eager   Method Booklet/handout   Response Verbalizes Understanding   Education Comments healing precations   Communication with other professionals   Communication with other professionals f/u with Dr. Jurado 4/11   Plan   Home program above ex   Plan for next session f/u when returning from vacation if needed   Comments   Comments s/p 6 weeks L TSA (DOS: 2/28/18)   Total Session Time   Timed Code Treatment Minutes 30   Total Treatment Time (sum of timed and untimed services) 30, te2       Please contact me with any questions or concerns.  Thank you for your referral.    Flower Loredo, PT, DPT, OCS  Physical Therapist, Orthopedic Certified Specialist  [facility]  [phone]

## 2018-06-04 NOTE — ADDENDUM NOTE
Encounter addended by: Flower Loredo, PT on: 6/4/2018  3:12 PM<BR>     Actions taken: Sign clinical note, Flowsheet accepted, Episode resolved

## 2018-06-04 NOTE — PROGRESS NOTES
PHYSICAL THERAPY DISCHARGE NOTE  04/11/18 0900   Signing Clinician's Name / Credentials   Signing clinician's name / credentials Flower Loredo, PT, DPT, OCS   Session Number   Session Number 10 medicare   Progress Note/Recertification   Progress Note Due Date 04/16/18   Recertification Due Date 05/28/18   Carry: Carrying, Moving & Handling Objects   Carry Current Status,  (eval/re-eval & every progress note) CJ: 20-39% impairment   Current Carry Modifier Rationale clinical judgement, ROM, SPADI   Carry Goal,  (eval/re-eval, every progress note, & discharge) CI: 1-19% impairment   Adult Goals   PT Ortho Eval Goals 1;2;3;4   Ortho Goal 1   Goal Identifier 1   Goal Description Patient will be able to perform dressing with minimal difficulty.   Target Date 04/16/18   Date Met 04/05/18   Ortho Goal 2   Goal Identifier 2   Goal Description Patient will be able to reach top shelf with minimal difficulty and pain 2/10 or less.   Target Date 04/30/18   Date Met (no active motion yet)   Ortho Goal 3   Goal Identifier 3   Goal Description Patient will be able to lift a gallon of milk and place on middle or top shelf with minimal difficulty and pain 2/10 or less.   Target Date 05/28/18   Date Met (Not appropriate yet)   Ortho Goal 4   Goal Identifier 4   Goal Description Patient will be independent and compliant with HEP to aid functional recovery.   Target Date 05/28/18   Date Met (compliant, continuing to progress )   Subjective Report   Subjective Report No pain at rest. Will be leaving for 1 month for vacation and would like to know what to self progress himself to.    Objective Measures   Objective Measures Objective Measure 1;Objective Measure 2   Objective Measure 1   Objective Measure L shoulder PROM   Details Flexion 148*, ER(0) 45*   Treatment Interventions   Interventions Therapeutic Procedure/Exercise;Manual Therapy   Therapeutic Procedure/exercise   Minutes 30   Skilled Intervention ROM to improve  function   Patient Response minimal pain   Treatment Detail UBE level 2 x 3 min (fwd/back) pulleys into flexion x 15 and scaption x 15. modified stick ex to: wall slides into flexion, doorway ER, IR with towel 10' holds x 10 to be done daily. instr pt in strength program and how to self progress with band colors: rowing RTB x 20, lat pull downs RTB x 20, resisted ER YTB x 20, resisted IR YTB x 20 - strength ex to be done every other day.   Education   Learner Patient   Readiness Eager   Method Booklet/handout   Response Verbalizes Understanding   Education Comments healing precations   Communication with other professionals   Communication with other professionals f/u with Dr. Jurado 4/11   Plan   Home program above ex   Plan for next session f/u when returning from vacation if needed   Comments   Comments s/p 6 weeks L TSA (DOS: 2/28/18)   Total Session Time   Timed Code Treatment Minutes 30   Total Treatment Time (sum of timed and untimed services) 30, te2     UPDATE 6/4/2018: Patient did not need to return for follow up visit. Discharge from skilled PT services.     Medicare G-code: Patient did not attend a final scheduled session prior to discharge. Unable to determine discharge functional status.          Please contact me with any questions or concerns.  Thank you for your referral.    Flower Loredo, PT, DPT, OCS  Physical Therapist, Orthopedic Certified Specialist  [facility]  [phone]

## 2019-10-03 ENCOUNTER — HEALTH MAINTENANCE LETTER (OUTPATIENT)
Age: 69
End: 2019-10-03

## 2019-12-16 ENCOUNTER — HEALTH MAINTENANCE LETTER (OUTPATIENT)
Age: 69
End: 2019-12-16

## 2020-10-22 NOTE — PLAN OF CARE
[place] [facility] DISCHARGE NOTE    Patient discharged to home at 1:04 PM via wheel chair. Accompanied by spouse and staff. Discharge instructions reviewed with patient, opportunity offered to ask questions. Prescriptions sent to patients preferred pharmacy. All belongings sent with patient.    Abdias Heath   84yo F, nonsmoker, with PMHx of lung adenocarcinoma (s/p resection/XRT), severe MR, and recent admission 8/2020 for fall and R leg weakness, again 10/2020 for SOB with recurrent R-sided pleural effusions, presents to ED for hypoxemia to 80s on 3L NC (her usual O2), as well as productive cough with green sputum and LE edema.

## 2021-01-15 ENCOUNTER — HEALTH MAINTENANCE LETTER (OUTPATIENT)
Age: 71
End: 2021-01-15

## 2021-09-05 ENCOUNTER — HEALTH MAINTENANCE LETTER (OUTPATIENT)
Age: 71
End: 2021-09-05

## 2022-02-20 ENCOUNTER — HEALTH MAINTENANCE LETTER (OUTPATIENT)
Age: 72
End: 2022-02-20

## 2022-10-23 ENCOUNTER — HEALTH MAINTENANCE LETTER (OUTPATIENT)
Age: 72
End: 2022-10-23

## 2023-04-02 ENCOUNTER — HEALTH MAINTENANCE LETTER (OUTPATIENT)
Age: 73
End: 2023-04-02

## 2023-05-11 ENCOUNTER — TRANSCRIBE ORDERS (OUTPATIENT)
Dept: OTHER | Age: 73
End: 2023-05-11

## 2023-05-11 DIAGNOSIS — Z96.651 S/P TOTAL KNEE ARTHROPLASTY, RIGHT: Primary | ICD-10-CM

## 2023-05-24 NOTE — H&P (VIEW-ONLY)
[facility]      Preoperative Consultation   Jorge Alberto Frye   : 1950   Gender: male    Date of Encounter: 2023    Nursing Notes:   Kim Baugh  2023 10:52 AM  Signed  Jorge Alberto Frye is a 72 y.o. male (1950) who presents for preop evaluation undergoing Total right knee arthroplasty.    Date of Surgery: 23  Surgical Specialty: Orthopedic/Spine  Comfort, Jorge Alberto Evans MD - [facility]  [facility]urgical Facility: [facility]  Fax number: [phone]  Surgery type: inpatient  Primary Physician: Rich Lloyd MA....................  2023   10:40 AM         History of Present Illness   Pre op eval, r tka at [facility].  Feels well.  Blood pressure well controlled, no gi, gu symptoms or concerns. Although he is of an age that post op urinary retention might be an issue.  No respiratory concerns.  Has albuterol , rare use.  Former smoker.  Takes aspirin garlic and vitamin E for platelet management and will put those on hold for 5 days prior to surgery.  Chronic reflux.  He is on omeprazole making reflux of at least acidic material unlikely around surgery.     Review of Systems   A comprehensive review of systems was negative except for items noted in HPI.    Patient Active Problem List   Diagnosis Code     Essential hypertension, benign I10     Esophageal reflux K21.9     Unspecified asthma(493.90) J45.909     Impotence of organic origin N52.9     SCREENING MALIGNANT NEOPLASMS COLON  due    Z12.11     Special screening for malignant neoplasm of prostate Z12.5     Family history of malignant neoplasm of prostate Z80.42     Acute pancreatitis K85.90     HYPERLIPIDEMIA,  LOW HDL E78.5     Stricture and stenosis of esophagus K22.2     LBP (Low Back Pain) l5 S1   ddd M54.50     Cyst, epididymis N50.3     Abnormal PSA R97.20     Primary osteoarthritis of both knees M17.0     Fatty liver K76.0     Status post total left knee  replacement Z96.652     Status post total replacement of left shoulder Z96.612      glaucoma H40.89     Gynecomastia N62     Sensorineural hearing loss (SNHL), bilateral H90.3     Current Outpatient Medications   Medication Sig     albuterol (PROVENTIL) 0.083 % neb solution Inhale 3 mL via a nebulizer every 4 hours if needed.     albuterol HFA (PRO-AIR; VENTOLIN; PROVENTIL) 90 mcg/actuation inhaler Inhale 2 Puffs by mouth every 4 hours if needed for Wheezing 1st choice.     amLODIPine (NORVASC) 10 mg tablet Take 1 Tablet (10 mg) by mouth once daily.     ASPIRIN 81 MG TAB, DELAYED RELEASE take 1 tablet (81mg) by oral route once daily     cholecalciferol (VITAMIN D) 5,000 unit capsule Take 1 capsule by mouth once daily.     ciclopirox solution (LOPROX) 8 % solution      FISH  MG CAP take 1 tablet daily     fluticasone propion-salmeterol (AIRDUO  RESPICLICK) 232-14 mcg/actuation inhaler Inhale 1 puff twice daily     furosemide (LASIX) 20 mg tablet TAKE ONE-HALF TABLET BY  MOUTH IN THE MORNING     GARLIC 400 MG TAB, DELAYED RELEASE 1 daily     ibuprofen (ADVIL; MOTRIN) 400 mg tablet Take 1 tablet by mouth 4 times daily if needed.     lactobacillus rhamnosus, GG, (CULTURELLE) 10 billion cell capsule Take 1 capsule by mouth once daily.     lisinopril-hydrochlorothiazide, 20-25 mg, (PRINZIDE, ZESTORETIC) 20-25 mg per tablet Take 1 Tablet by mouth two times daily.     metoprolol succinate (TOPROL XL) 100 mg Sustained-Release tablet Take 1 Tablet (100 mg) by mouth once daily.     multivitamin (MVI) tablet Take 1 tablet by mouth once daily.     omeprazole (PRILOSEC OTC) 20 mg tablet Take 1 tablet by mouth once daily.     rosuvastatin (CRESTOR) 10 mg tablet Take 1 Tablet (10 mg) by mouth at bedtime.     tadalafiL (CIALIS) 5 mg tablet Take 1 Tablet (5 mg) by mouth once daily if needed for Erectile Dysfunction. Take 30 minutes before sexual activity.     No current facility-administered medications for this visit.  "    Medications have been reviewed by me and are current to the best of my knowledge and ability.     Allergies   Allergen Reactions     Aldactone [Spironolactone] Gynecomastia     Past Surgical History:   . Laterality Date     (IA) MS REMOVAL OF SPERM DUCT(S)       ESOPHAGOGASTRODUODENOSCOPY  2009    gastritis     Flexsigmoidoscopy       HERNIA REPAIR       TONSIL AND ADENOIDECTOMY      As a child     TOTAL KNEE ARTHROPLASTY Left 2017     Social History     Tobacco Use     Smoking status: Former     Current packs/day: 0.00     Average packs/day: 2.0 packs/day for 20.0 years (40.0 pk-yrs)     Types: Cigarettes     Start date: 1964     Quit date: 1984     Years since quittin.4     Smokeless tobacco: Never     Tobacco comments:     quit    Vaping Use     Vaping Use: Never used   Substance Use Topics     Alcohol use: Yes     Alcohol/week: 14.0 standard drinks of alcohol     Types: 14 Shots of liquor per week     Comment: rum and Coke every night     Drug use: No     Family History   Problem Relation Age of Onset     Cancer Mother         Lung and cervical cancer     Cancer-prostate Father      Cancer Maternal Grandfather         Lung     Cancer-breast Sister         Lung cancer       PAST DIFFICULTY WITH ANESTHESIA: None     Physical Exam   /74 (Cuff Site: Right Arm, Position: Sitting, Cuff Size: Adult Large)   Pulse 78   Temp 98.2  F (36.8  C) (Tympanic)   Ht 1.911 m (6' 3.25\")   Wt (!) 141.7 kg (312 lb 4.8 oz)   SpO2 98%   BMI 38.78 kg/m   Body mass index is 38.78 kg/m .  General Appearance: Pleasant, alert, appropriate appearance for age. No acute distress  OroPharynx Exam: Dental hygiene adequate. Normal buccal mucosa. Normal pharynx.  Neck Exam: Range of motion is normal.  Good extension.  Chest/Respiratory Exam: Normal chest wall and respirations. Clear to auscultation.  Cardiovascular Exam: Regular rate and rhythm. S1, S2, no murmur, click, gallop, or rubs.  Musculoskeletal " Exam: Right knee with chronic arthritic pain consistent with his known arthritis.  Left knee has been replaced and is doing well  Skin: no rash or abnormalities  Neurologic Exam: Nonfocal;   , normal gross motor movement, tone, and coordination. No tremor.  Psychiatric Exam: Alert and oriented, appropriate affect.     Assessment / Plan       Labs: Completed today Labs are pending.  Routine potassium check hemoglobin check.  ECG: Completed today.  Normal sinus rhythm.    The Pre-Op Tool    Recommendations      Intermediate Risk Procedure    Risk of CV Complication (RCRI)  0.5%    Current Cardiac Status  Good exertional capacity ( > 4 mets )    Cardiac History  No history of coronary artery disease           Labs  HGB within last 30 days  Potassium within last 30 days  EKG  Baseline EKG within the last 12 months  CXR  Not indicated    Stress Testing  Not indicated    * Testing recommendations are intended to assist, but not direct, clinical decisions.           Type & Screen should be obtained by Anesthesia only if the risk of transfusion is > 5% for the procedure         Hold Lisinopril / HCTZ the evening before and/or morning of the procedure.  Continue taking Metoprolol (Lopressor, Toprol); be sure to take the evening before and/or morning of the procedure.  Hold Aspirin for 7 days prior to procedure    * Medication recommendations are not intended to be exhaustive; they are limited to common medications that are potentially dangerous if incorrectly managed          Labs  * Data supports elimination of  routine  laboratory testing in favor of focused,  indicated  testing based on medical co-morbidities. A 2009 study randomized 1061 patients undergoing ambulatory, non-cataract surgery to routine or to indicated testing. Perioperative adverse events were similar (Anesthesia & Analgesia 2009;108:467-75; Anesthesiol. Clin. 2016 Mar;34(1):43-58).  Stress Testing  * The current ACC/AHA guideline states that 'non-invasive  stress testing is not useful for patients [with no clinical risk factors] undergoing noncardiac surgery' (JAC. 2014;64(21);e1-76.).  Antiplatelet Therapy  * In the 2014 POISE-2 trial, continuation of aspirin in high cardiovascular risk patients undergoing non-cardiac surgery increased the risk for major bleeding without reducing the rate of death, myocardial infarction, or stroke. In most circumstances our experts recommend a 7 day aspirin hold in patients without coronary stents. Continuation of aspirin may be reasonable in patients with high risk coronary artery disease or cerebrovascular disease who are not undergoing high bleeding risk procedures (NEJM 2014;370:1494-503; ANOOP 2015; Clin Med 2016; 16: 535-40; Anest Analg 2020; 131: 111-23).     Session ID: [id number]  Endnotes and bibliography available upon request: [email or web address]         ICD-10-CM    1. Pre-operative clearance  Z01.818 SC READING EKG - NO CHARGE, COMP ONLY   Okay for the planned surgery.  I talked about acute urinary retention postoperatively and he is to make anyone aware of those symptoms should it develop whether he calls me or presents to emergency room or call the surgical team.  EKG 12 LEAD     SC ECG ROUTINE ECG W/LEAST 12 LDS W/I&R      2. Essential hypertension, benign  I10 BASIC METABOLIC PANEL   He may hold his lisinopril with hydrochlorothiazide postoperatively if he has hypotension which is certainly common in the setting.  I have encouraged him to keep an eye on his blood pressure he has a machine to check his numbers and hold his diuretic with lisinopril for systolics less than 105 or orthostatic symptoms and to alert people if those are happening too often.  Safety in the postoperative 2 weeks was stressed.  The last thing I want him to do his fall and become injured.    CBC W PLT NO DIFF        Patient is cleared for planned procedure.   Electronically Signed by:   Josh Mckeon MD    5/24/2023

## 2023-06-16 NOTE — PROGRESS NOTES
06/16/23 1540   Discharge Planning   Patient/Family Anticipates Transition to home with family   Concerns to be Addressed denies needs/concerns at this time   Living Arrangements   People in Home spouse   Type of Residence Private Residence   Is your private residence a single family home or apartment? Single family home   Number of Stairs, Within Home, Primary none;other (see comments)  (3 steps in to home)   Stair Railings, Within Home, Primary none   Once home, are you able to live on one level? Yes   Which level? Main Level   Bathroom Shower/Tub Walk-in shower   Equipment Currently Used at Home none   Support System   Support Systems Spouse/Significant Other   Do you have someone available to stay with you one or two nights once you are home? Yes   Medical Clearance   Date of Physical 05/24/23   Clinic Name [facility]   Blood   Known Bleeding Disorder or Coagulopathy No   Does the patient have any Bahai/cultural preferences related to blood products? No   Education   Has the patient scheduled or completed pre-op total joint education, either in class or online, in the last 12 months? Yes   What day did the patient complete, or plan to complete, pre-op total joint education? 06/12/23   Patient attended total joint pre-op class/received pre-op teaching  online   Relationship/Living Environment   Name(s) of People in Home [place] - spouse

## 2023-06-20 ENCOUNTER — ANESTHESIA EVENT (OUTPATIENT)
Dept: SURGERY | Facility: CLINIC | Age: 73
End: 2023-06-20
Payer: MEDICARE

## 2023-06-20 NOTE — ANESTHESIA PREPROCEDURE EVALUATION
Anesthesia Pre-Procedure Evaluation    Patient: Jorge Alberto Frye   MRN: [id number] : 1950        Procedure : Procedure(s):  Total Knee Arthroplasty          Past Medical History:   Diagnosis Date     Acute pancreatitis 2006    Overview:  3/06     had negative w/u for etiology. ? cause    gb eval negative       Arthritis      Hypertension      Impotence      Spermatocele     left      Past Surgical History:   Procedure Laterality Date     ARTHROPLASTY KNEE Left 2017    Procedure: ARTHROPLASTY KNEE;  Left Total Knee Arthroplasty;  Surgeon: Jorge Alberto Jurado MD;  Location: [facility]     ARTHROPLASTY SHOULDER Right 2016     ARTHROPLASTY SHOULDER Right 2016    Procedure: ARTHROPLASTY SHOULDER;  Surgeon: Jorge Alberto Jurado MD;  Location: [facility] OR     ARTHROPLASTY SHOULDER Left 2018    Procedure: ARTHROPLASTY SHOULDER;  Left total shoulder arthroplasty, possible reverse;  Surgeon: Jorge Alberto Jurado MD;  Location: [facility] OR     HERNIA REPAIR       TONSILLECTOMY & ADENOIDECTOMY       UPPER GI ENDOSCOPY      gastritis      Allergies   Allergen Reactions     Spironolactone Other (See Comments) and Swelling      Social History     Tobacco Use     Smoking status: Former     Types: Cigarettes     Quit date: 1984     Years since quittin.4     Smokeless tobacco: Never   Vaping Use     Vaping status: Not on file   Substance Use Topics     Alcohol use: Yes     Alcohol/week: 0.0 standard drinks of alcohol      Wt Readings from Last 1 Encounters:   18 133.4 kg (294 lb)        Anesthesia Evaluation   Pt has had prior anesthetic. Type: General, MAC and Regional.    No history of anesthetic complications       ROS/MED HX  ENT/Pulmonary:     (+) LOUIE risk factors, hypertension, obese, tobacco use, Past use, Intermittent, asthma Treatment: Inhaler prn,      Neurologic:  - neg neurologic ROS     Cardiovascular:     (+) Dyslipidemia hypertension--CAD ---    METS/Exercise Tolerance:     Hematologic:  -  neg hematologic  ROS     Musculoskeletal:   (+) arthritis,     GI/Hepatic: Comment: Esophageal strictures    (+) GERD, Asymptomatic on medication,     Renal/Genitourinary:  - neg Renal ROS     Endo: Comment: Morbid obesity    (+) Obesity,     Psychiatric/Substance Use:  - neg psychiatric ROS     Infectious Disease:  - neg infectious disease ROS     Malignancy:  - neg malignancy ROS     Other:  - neg other ROS          Physical Exam    Airway  airway exam normal      Mallampati: III   TM distance: > 3 FB   Neck ROM: full   Mouth opening: > 3 cm    Respiratory Devices and Support         Dental       (+) Minor Abnormalities - some fillings, tiny chips      Cardiovascular   cardiovascular exam normal          Pulmonary   pulmonary exam normal                OUTSIDE LABS:  CBC:   Lab Results   Component Value Date    WBC 7.6 06/05/2017    WBC 9.0 06/03/2017    HGB 12.8 (L) 03/01/2018    HGB 11.8 (L) 06/05/2017    HCT 35.7 (L) 06/05/2017    HCT 38.3 (L) 06/03/2017     06/05/2017     06/03/2017     BMP:   Lab Results   Component Value Date     06/03/2017    POTASSIUM 3.9 06/03/2017    CHLORIDE 105 06/03/2017    CO2 24 06/03/2017    BUN 15 06/03/2017    CR 1.01 06/03/2017    CR 1.08 06/02/2017     (H) 06/03/2017    GLC 94 06/02/2017     COAGS:   Lab Results   Component Value Date    INR 1.04 05/31/2017     POC:   Lab Results   Component Value Date     (H) 03/01/2018     HEPATIC: No results found for: ALBUMIN, PROTTOTAL, ALT, AST, GGT, ALKPHOS, BILITOTAL, BILIDIRECT, ENEDELIA  OTHER:   Lab Results   Component Value Date    ALEXANDER 8.7 06/03/2017    .0 (H) 06/05/2017    SED 20 06/02/2017       Anesthesia Plan    ASA Status:  3   NPO Status:  NPO Appropriate    Anesthesia Type: Spinal.      Maintenance: Balanced.        Consents    Anesthesia Plan(s) and associated risks, benefits, and realistic alternatives discussed. Questions answered and patient/representative(s) expressed  understanding.     - Discussed: Risks, Benefits and Alternatives for BOTH SEDATION and the PROCEDURE were discussed     - Discussed with:  Patient, Spouse         Postoperative Care    Pain management: IV analgesics, Oral pain medications, Multi-modal analgesia.   PONV prophylaxis: Ondansetron (or other 5HT-3), Dexamethasone or Solumedrol     Comments:                MAME Martinez CRNA

## 2023-06-21 ENCOUNTER — APPOINTMENT (OUTPATIENT)
Dept: GENERAL RADIOLOGY | Facility: CLINIC | Age: 73
End: 2023-06-21
Attending: ORTHOPAEDIC SURGERY
Payer: MEDICARE

## 2023-06-21 ENCOUNTER — ANCILLARY PROCEDURE (OUTPATIENT)
Dept: ULTRASOUND IMAGING | Facility: CLINIC | Age: 73
End: 2023-06-21
Attending: NURSE ANESTHETIST, CERTIFIED REGISTERED
Payer: MEDICARE

## 2023-06-21 ENCOUNTER — HOSPITAL ENCOUNTER (OUTPATIENT)
Facility: CLINIC | Age: 73
Discharge: HOME | End: 2023-06-22
Attending: ORTHOPAEDIC SURGERY | Admitting: ORTHOPAEDIC SURGERY
Payer: MEDICARE

## 2023-06-21 ENCOUNTER — ANESTHESIA (OUTPATIENT)
Dept: SURGERY | Facility: CLINIC | Age: 73
End: 2023-06-21
Payer: MEDICARE

## 2023-06-21 DIAGNOSIS — Z96.651 S/P TOTAL KNEE REPLACEMENT, RIGHT: Primary | ICD-10-CM

## 2023-06-21 PROBLEM — M17.11 RIGHT KNEE DJD: Status: ACTIVE | Noted: 2023-06-21

## 2023-06-21 LAB
CREAT SERPL-MCNC: 1.13 MG/DL (ref 0.67–1.17)
ERYTHROCYTE [DISTWIDTH] IN BLOOD BY AUTOMATED COUNT: 14.1 % (ref 10–15)
GFR SERPL CREATININE-BSD FRML MDRD: 68 ML/MIN/1.73M2
HCT VFR BLD AUTO: 46.5 % (ref 40–53)
HGB BLD-MCNC: 15.7 G/DL (ref 13.3–17.7)
MCH RBC QN AUTO: 29.8 PG (ref 26.5–33)
MCHC RBC AUTO-ENTMCNC: 33.8 G/DL (ref 31.5–36.5)
MCV RBC AUTO: 88 FL (ref 78–100)
PLATELET # BLD AUTO: 218 10E3/UL (ref 150–450)
POTASSIUM SERPL-SCNC: 3.8 MMOL/L (ref 3.4–5.3)
RBC # BLD AUTO: 5.27 10E6/UL (ref 4.4–5.9)
WBC # BLD AUTO: 8.8 10E3/UL (ref 4–11)

## 2023-06-21 PROCEDURE — 250N000011 HC RX IP 250 OP 636: Mod: JZ | Performed by: NURSE ANESTHETIST, CERTIFIED REGISTERED

## 2023-06-21 PROCEDURE — 250N000011 HC RX IP 250 OP 636: Performed by: NURSE ANESTHETIST, CERTIFIED REGISTERED

## 2023-06-21 PROCEDURE — C1713 ANCHOR/SCREW BN/BN,TIS/BN: HCPCS | Performed by: ORTHOPAEDIC SURGERY

## 2023-06-21 PROCEDURE — 360N000077 HC SURGERY LEVEL 4, PER MIN: Performed by: ORTHOPAEDIC SURGERY

## 2023-06-21 PROCEDURE — 250N000011 HC RX IP 250 OP 636: Performed by: ORTHOPAEDIC SURGERY

## 2023-06-21 PROCEDURE — 271N000001 HC OR GENERAL SUPPLY NON-STERILE: Performed by: ORTHOPAEDIC SURGERY

## 2023-06-21 PROCEDURE — 250N000013 HC RX MED GY IP 250 OP 250 PS 637: Performed by: ORTHOPAEDIC SURGERY

## 2023-06-21 PROCEDURE — C1776 JOINT DEVICE (IMPLANTABLE): HCPCS | Performed by: ORTHOPAEDIC SURGERY

## 2023-06-21 PROCEDURE — 250N000009 HC RX 250: Performed by: NURSE ANESTHETIST, CERTIFIED REGISTERED

## 2023-06-21 PROCEDURE — 999N000065 XR KNEE PORT RIGHT 1/2 VIEWS: Mod: RT

## 2023-06-21 PROCEDURE — 370N000017 HC ANESTHESIA TECHNICAL FEE, PER MIN: Performed by: ORTHOPAEDIC SURGERY

## 2023-06-21 PROCEDURE — 710N000009 HC RECOVERY PHASE 1, LEVEL 1, PER MIN: Performed by: ORTHOPAEDIC SURGERY

## 2023-06-21 PROCEDURE — 84132 ASSAY OF SERUM POTASSIUM: CPT | Performed by: PHYSICIAN ASSISTANT

## 2023-06-21 PROCEDURE — 85014 HEMATOCRIT: CPT | Performed by: PHYSICIAN ASSISTANT

## 2023-06-21 PROCEDURE — 258N000003 HC RX IP 258 OP 636: Performed by: NURSE ANESTHETIST, CERTIFIED REGISTERED

## 2023-06-21 PROCEDURE — 272N000001 HC OR GENERAL SUPPLY STERILE: Performed by: ORTHOPAEDIC SURGERY

## 2023-06-21 PROCEDURE — 999N000141 HC STATISTIC PRE-PROCEDURE NURSING ASSESSMENT: Performed by: ORTHOPAEDIC SURGERY

## 2023-06-21 PROCEDURE — 36415 COLL VENOUS BLD VENIPUNCTURE: CPT | Performed by: PHYSICIAN ASSISTANT

## 2023-06-21 PROCEDURE — 250N000013 HC RX MED GY IP 250 OP 250 PS 637: Performed by: PHYSICIAN ASSISTANT

## 2023-06-21 PROCEDURE — 250N000011 HC RX IP 250 OP 636: Performed by: PHYSICIAN ASSISTANT

## 2023-06-21 PROCEDURE — 82565 ASSAY OF CREATININE: CPT | Performed by: PHYSICIAN ASSISTANT

## 2023-06-21 PROCEDURE — 258N000003 HC RX IP 258 OP 636: Performed by: ORTHOPAEDIC SURGERY

## 2023-06-21 DEVICE — IMP COMP PATELLA HOWM TRI 38 10MM 5551-L-381: Type: IMPLANTABLE DEVICE | Site: KNEE | Status: FUNCTIONAL

## 2023-06-21 DEVICE — IMPLANTABLE DEVICE: Type: IMPLANTABLE DEVICE | Site: KNEE | Status: FUNCTIONAL

## 2023-06-21 DEVICE — IMP COMP FEM STRK TRIATHLN PS RT 7 5515-F-702: Type: IMPLANTABLE DEVICE | Site: KNEE | Status: FUNCTIONAL

## 2023-06-21 DEVICE — IMP BASEPLATE TIBIAL HOWM TRI 6 5520-B-600: Type: IMPLANTABLE DEVICE | Site: KNEE | Status: FUNCTIONAL

## 2023-06-21 DEVICE — BONE CEMENT RADIOPAQUE SIMPLEX HV FULL DOSE 6194-1-001: Type: IMPLANTABLE DEVICE | Site: KNEE | Status: FUNCTIONAL

## 2023-06-21 RX ORDER — AMOXICILLIN 250 MG
1-2 CAPSULE ORAL 2 TIMES DAILY
Qty: 30 TABLET | Refills: 0 | Status: ON HOLD
Start: 2023-06-21 | End: 2023-10-20

## 2023-06-21 RX ORDER — MEPERIDINE HYDROCHLORIDE 25 MG/ML
12.5 INJECTION INTRAMUSCULAR; INTRAVENOUS; SUBCUTANEOUS EVERY 5 MIN PRN
Status: DISCONTINUED | OUTPATIENT
Start: 2023-06-21 | End: 2023-06-21 | Stop reason: HOSPADM

## 2023-06-21 RX ORDER — METOPROLOL SUCCINATE 100 MG/1
100 TABLET, EXTENDED RELEASE ORAL DAILY
Status: DISCONTINUED | OUTPATIENT
Start: 2023-06-22 | End: 2023-06-22 | Stop reason: HOSPADM

## 2023-06-21 RX ORDER — ACETAMINOPHEN 325 MG/1
650 TABLET ORAL EVERY 4 HOURS PRN
Status: DISCONTINUED | OUTPATIENT
Start: 2023-06-24 | End: 2023-06-22 | Stop reason: HOSPADM

## 2023-06-21 RX ORDER — LIDOCAINE 40 MG/G
CREAM TOPICAL
Status: DISCONTINUED | OUTPATIENT
Start: 2023-06-21 | End: 2023-06-21 | Stop reason: HOSPADM

## 2023-06-21 RX ORDER — NALOXONE HYDROCHLORIDE 0.4 MG/ML
0.2 INJECTION, SOLUTION INTRAMUSCULAR; INTRAVENOUS; SUBCUTANEOUS
Status: DISCONTINUED | OUTPATIENT
Start: 2023-06-21 | End: 2023-06-22 | Stop reason: HOSPADM

## 2023-06-21 RX ORDER — DIPHENHYDRAMINE HYDROCHLORIDE 50 MG/ML
INJECTION INTRAMUSCULAR; INTRAVENOUS PRN
Status: DISCONTINUED | OUTPATIENT
Start: 2023-06-21 | End: 2023-06-21

## 2023-06-21 RX ORDER — OXYCODONE HYDROCHLORIDE 5 MG/1
5-10 TABLET ORAL EVERY 4 HOURS PRN
Qty: 30 TABLET | Refills: 0 | Status: ON HOLD | OUTPATIENT
Start: 2023-06-21 | End: 2023-10-20

## 2023-06-21 RX ORDER — ONDANSETRON 4 MG/1
4 TABLET, ORALLY DISINTEGRATING ORAL EVERY 6 HOURS PRN
Status: DISCONTINUED | OUTPATIENT
Start: 2023-06-21 | End: 2023-06-22 | Stop reason: HOSPADM

## 2023-06-21 RX ORDER — HYDROXYZINE HYDROCHLORIDE 10 MG/1
10 TABLET, FILM COATED ORAL EVERY 6 HOURS PRN
Status: DISCONTINUED | OUTPATIENT
Start: 2023-06-21 | End: 2023-06-21 | Stop reason: HOSPADM

## 2023-06-21 RX ORDER — ALBUTEROL SULFATE 0.83 MG/ML
2.5 SOLUTION RESPIRATORY (INHALATION) EVERY 4 HOURS PRN
Status: DISCONTINUED | OUTPATIENT
Start: 2023-06-21 | End: 2023-06-22 | Stop reason: HOSPADM

## 2023-06-21 RX ORDER — FENTANYL CITRATE 50 UG/ML
25-100 INJECTION, SOLUTION INTRAMUSCULAR; INTRAVENOUS
Status: DISCONTINUED | OUTPATIENT
Start: 2023-06-21 | End: 2023-06-21 | Stop reason: HOSPADM

## 2023-06-21 RX ORDER — BUPIVACAINE HYDROCHLORIDE 7.5 MG/ML
INJECTION, SOLUTION INTRASPINAL PRN
Status: DISCONTINUED | OUTPATIENT
Start: 2023-06-21 | End: 2023-06-21

## 2023-06-21 RX ORDER — OXYCODONE HYDROCHLORIDE 5 MG/1
10 TABLET ORAL EVERY 4 HOURS PRN
Status: DISCONTINUED | OUTPATIENT
Start: 2023-06-21 | End: 2023-06-22 | Stop reason: HOSPADM

## 2023-06-21 RX ORDER — FENTANYL CITRATE 50 UG/ML
25 INJECTION, SOLUTION INTRAMUSCULAR; INTRAVENOUS EVERY 5 MIN PRN
Status: DISCONTINUED | OUTPATIENT
Start: 2023-06-21 | End: 2023-06-21 | Stop reason: HOSPADM

## 2023-06-21 RX ORDER — POLYETHYLENE GLYCOL 3350 17 G/17G
17 POWDER, FOR SOLUTION ORAL DAILY
Status: DISCONTINUED | OUTPATIENT
Start: 2023-06-22 | End: 2023-06-22 | Stop reason: HOSPADM

## 2023-06-21 RX ORDER — AMLODIPINE BESYLATE 10 MG/1
1 TABLET ORAL DAILY
COMMUNITY
Start: 2023-01-03

## 2023-06-21 RX ORDER — TADALAFIL 5 MG/1
1 TABLET ORAL DAILY PRN
COMMUNITY
Start: 2022-12-29

## 2023-06-21 RX ORDER — METOPROLOL SUCCINATE 100 MG/1
1 TABLET, EXTENDED RELEASE ORAL DAILY
COMMUNITY
Start: 2022-12-29

## 2023-06-21 RX ORDER — ACETAMINOPHEN 325 MG/1
975 TABLET ORAL ONCE
Status: COMPLETED | OUTPATIENT
Start: 2023-06-21 | End: 2023-06-21

## 2023-06-21 RX ORDER — HYDROMORPHONE HCL IN WATER/PF 6 MG/30 ML
0.4 PATIENT CONTROLLED ANALGESIA SYRINGE INTRAVENOUS EVERY 5 MIN PRN
Status: DISCONTINUED | OUTPATIENT
Start: 2023-06-21 | End: 2023-06-21 | Stop reason: HOSPADM

## 2023-06-21 RX ORDER — AMOXICILLIN 250 MG
1 CAPSULE ORAL 2 TIMES DAILY
Status: DISCONTINUED | OUTPATIENT
Start: 2023-06-21 | End: 2023-06-22 | Stop reason: HOSPADM

## 2023-06-21 RX ORDER — DIMENHYDRINATE 50 MG/ML
25 INJECTION, SOLUTION INTRAMUSCULAR; INTRAVENOUS
Status: DISCONTINUED | OUTPATIENT
Start: 2023-06-21 | End: 2023-06-21 | Stop reason: HOSPADM

## 2023-06-21 RX ORDER — ONDANSETRON 2 MG/ML
4 INJECTION INTRAMUSCULAR; INTRAVENOUS EVERY 30 MIN PRN
Status: DISCONTINUED | OUTPATIENT
Start: 2023-06-21 | End: 2023-06-21 | Stop reason: HOSPADM

## 2023-06-21 RX ORDER — GLYCOPYRROLATE 0.2 MG/ML
INJECTION, SOLUTION INTRAMUSCULAR; INTRAVENOUS PRN
Status: DISCONTINUED | OUTPATIENT
Start: 2023-06-21 | End: 2023-06-21

## 2023-06-21 RX ORDER — ROSUVASTATIN CALCIUM 5 MG/1
10 TABLET, COATED ORAL DAILY
Status: DISCONTINUED | OUTPATIENT
Start: 2023-06-22 | End: 2023-06-22 | Stop reason: HOSPADM

## 2023-06-21 RX ORDER — LIDOCAINE 40 MG/G
CREAM TOPICAL
Status: DISCONTINUED | OUTPATIENT
Start: 2023-06-21 | End: 2023-06-22 | Stop reason: HOSPADM

## 2023-06-21 RX ORDER — OXYCODONE HYDROCHLORIDE 5 MG/1
5 TABLET ORAL EVERY 4 HOURS PRN
Status: DISCONTINUED | OUTPATIENT
Start: 2023-06-21 | End: 2023-06-22 | Stop reason: HOSPADM

## 2023-06-21 RX ORDER — FENTANYL CITRATE 50 UG/ML
50 INJECTION, SOLUTION INTRAMUSCULAR; INTRAVENOUS EVERY 5 MIN PRN
Status: DISCONTINUED | OUTPATIENT
Start: 2023-06-21 | End: 2023-06-21 | Stop reason: HOSPADM

## 2023-06-21 RX ORDER — ASPIRIN 325 MG
325 TABLET, DELAYED RELEASE (ENTERIC COATED) ORAL DAILY
Qty: 42 TABLET | Refills: 0
Start: 2023-06-21 | End: 2023-10-30

## 2023-06-21 RX ORDER — EPINEPHRINE 0.1 MG/ML
INJECTION INTRAVENOUS PRN
Status: DISCONTINUED | OUTPATIENT
Start: 2023-06-21 | End: 2023-06-21

## 2023-06-21 RX ORDER — FENTANYL CITRATE 50 UG/ML
INJECTION, SOLUTION INTRAMUSCULAR; INTRAVENOUS PRN
Status: DISCONTINUED | OUTPATIENT
Start: 2023-06-21 | End: 2023-06-21

## 2023-06-21 RX ORDER — SODIUM CHLORIDE, SODIUM LACTATE, POTASSIUM CHLORIDE, CALCIUM CHLORIDE 600; 310; 30; 20 MG/100ML; MG/100ML; MG/100ML; MG/100ML
INJECTION, SOLUTION INTRAVENOUS CONTINUOUS
Status: DISCONTINUED | OUTPATIENT
Start: 2023-06-21 | End: 2023-06-21 | Stop reason: HOSPADM

## 2023-06-21 RX ORDER — NALOXONE HYDROCHLORIDE 0.4 MG/ML
0.4 INJECTION, SOLUTION INTRAMUSCULAR; INTRAVENOUS; SUBCUTANEOUS
Status: DISCONTINUED | OUTPATIENT
Start: 2023-06-21 | End: 2023-06-22 | Stop reason: HOSPADM

## 2023-06-21 RX ORDER — CEFAZOLIN SODIUM/WATER 3 G/30 ML
3 SYRINGE (ML) INTRAVENOUS
Status: COMPLETED | OUTPATIENT
Start: 2023-06-21 | End: 2023-06-21

## 2023-06-21 RX ORDER — FAMOTIDINE 20 MG/1
20 TABLET, FILM COATED ORAL 2 TIMES DAILY
Status: DISCONTINUED | OUTPATIENT
Start: 2023-06-21 | End: 2023-06-22 | Stop reason: HOSPADM

## 2023-06-21 RX ORDER — SODIUM CHLORIDE, SODIUM LACTATE, POTASSIUM CHLORIDE, CALCIUM CHLORIDE 600; 310; 30; 20 MG/100ML; MG/100ML; MG/100ML; MG/100ML
INJECTION, SOLUTION INTRAVENOUS CONTINUOUS
Status: DISCONTINUED | OUTPATIENT
Start: 2023-06-21 | End: 2023-06-22 | Stop reason: HOSPADM

## 2023-06-21 RX ORDER — METHOCARBAMOL 500 MG/1
500 TABLET, FILM COATED ORAL EVERY 6 HOURS PRN
Status: DISCONTINUED | OUTPATIENT
Start: 2023-06-21 | End: 2023-06-22 | Stop reason: HOSPADM

## 2023-06-21 RX ORDER — ONDANSETRON 4 MG/1
4 TABLET, ORALLY DISINTEGRATING ORAL EVERY 30 MIN PRN
Status: DISCONTINUED | OUTPATIENT
Start: 2023-06-21 | End: 2023-06-21 | Stop reason: HOSPADM

## 2023-06-21 RX ORDER — HYDROMORPHONE HCL IN WATER/PF 6 MG/30 ML
0.4 PATIENT CONTROLLED ANALGESIA SYRINGE INTRAVENOUS
Status: DISCONTINUED | OUTPATIENT
Start: 2023-06-21 | End: 2023-06-22 | Stop reason: HOSPADM

## 2023-06-21 RX ORDER — ONDANSETRON 2 MG/ML
INJECTION INTRAMUSCULAR; INTRAVENOUS PRN
Status: DISCONTINUED | OUTPATIENT
Start: 2023-06-21 | End: 2023-06-21

## 2023-06-21 RX ORDER — ACETAMINOPHEN 325 MG/1
650 TABLET ORAL EVERY 4 HOURS PRN
Qty: 100 TABLET | Refills: 0 | Status: ON HOLD | OUTPATIENT
Start: 2023-06-21 | End: 2023-10-20

## 2023-06-21 RX ORDER — CEFAZOLIN SODIUM/WATER 3 G/30 ML
3 SYRINGE (ML) INTRAVENOUS SEE ADMIN INSTRUCTIONS
Status: DISCONTINUED | OUTPATIENT
Start: 2023-06-21 | End: 2023-06-21 | Stop reason: HOSPADM

## 2023-06-21 RX ORDER — BISACODYL 10 MG
10 SUPPOSITORY, RECTAL RECTAL DAILY PRN
Status: DISCONTINUED | OUTPATIENT
Start: 2023-06-21 | End: 2023-06-22 | Stop reason: HOSPADM

## 2023-06-21 RX ORDER — PANTOPRAZOLE SODIUM 40 MG/1
40 TABLET, DELAYED RELEASE ORAL DAILY
Status: DISCONTINUED | OUTPATIENT
Start: 2023-06-22 | End: 2023-06-22 | Stop reason: HOSPADM

## 2023-06-21 RX ORDER — ONDANSETRON 2 MG/ML
4 INJECTION INTRAMUSCULAR; INTRAVENOUS EVERY 6 HOURS PRN
Status: DISCONTINUED | OUTPATIENT
Start: 2023-06-21 | End: 2023-06-22 | Stop reason: HOSPADM

## 2023-06-21 RX ORDER — ACETAMINOPHEN 325 MG/1
975 TABLET ORAL ONCE
Status: DISCONTINUED | OUTPATIENT
Start: 2023-06-21 | End: 2023-06-21 | Stop reason: HOSPADM

## 2023-06-21 RX ORDER — HYDROMORPHONE HCL IN WATER/PF 6 MG/30 ML
0.2 PATIENT CONTROLLED ANALGESIA SYRINGE INTRAVENOUS
Status: DISCONTINUED | OUTPATIENT
Start: 2023-06-21 | End: 2023-06-22 | Stop reason: HOSPADM

## 2023-06-21 RX ORDER — PROCHLORPERAZINE MALEATE 5 MG
5 TABLET ORAL EVERY 6 HOURS PRN
Status: DISCONTINUED | OUTPATIENT
Start: 2023-06-21 | End: 2023-06-22 | Stop reason: HOSPADM

## 2023-06-21 RX ORDER — TRANEXAMIC ACID 650 MG/1
1950 TABLET ORAL ONCE
Status: COMPLETED | OUTPATIENT
Start: 2023-06-21 | End: 2023-06-21

## 2023-06-21 RX ORDER — AMLODIPINE BESYLATE 10 MG/1
10 TABLET ORAL DAILY
Status: DISCONTINUED | OUTPATIENT
Start: 2023-06-22 | End: 2023-06-22 | Stop reason: HOSPADM

## 2023-06-21 RX ORDER — LIDOCAINE HYDROCHLORIDE 10 MG/ML
INJECTION, SOLUTION INFILTRATION; PERINEURAL PRN
Status: DISCONTINUED | OUTPATIENT
Start: 2023-06-21 | End: 2023-06-21

## 2023-06-21 RX ORDER — FUROSEMIDE 20 MG
TABLET ORAL
COMMUNITY
Start: 2022-12-29

## 2023-06-21 RX ORDER — CEFAZOLIN SODIUM 2 G/100ML
2 INJECTION, SOLUTION INTRAVENOUS EVERY 8 HOURS
Status: COMPLETED | OUTPATIENT
Start: 2023-06-21 | End: 2023-06-22

## 2023-06-21 RX ORDER — PROPOFOL 10 MG/ML
INJECTION, EMULSION INTRAVENOUS CONTINUOUS PRN
Status: DISCONTINUED | OUTPATIENT
Start: 2023-06-21 | End: 2023-06-21

## 2023-06-21 RX ORDER — ASPIRIN 325 MG
325 TABLET, DELAYED RELEASE (ENTERIC COATED) ORAL DAILY
Status: DISCONTINUED | OUTPATIENT
Start: 2023-06-22 | End: 2023-06-22 | Stop reason: HOSPADM

## 2023-06-21 RX ORDER — MAGNESIUM SULFATE HEPTAHYDRATE 40 MG/ML
2 INJECTION, SOLUTION INTRAVENOUS ONCE
Status: COMPLETED | OUTPATIENT
Start: 2023-06-21 | End: 2023-06-21

## 2023-06-21 RX ORDER — ASPIRIN 81 MG/1
81 TABLET ORAL DAILY
Status: ON HOLD | COMMUNITY
End: 2023-06-22

## 2023-06-21 RX ORDER — METHOCARBAMOL 500 MG/1
500 TABLET, FILM COATED ORAL 4 TIMES DAILY PRN
Qty: 60 TABLET | Refills: 1 | Status: SHIPPED | OUTPATIENT
Start: 2023-06-21

## 2023-06-21 RX ORDER — DEXAMETHASONE SODIUM PHOSPHATE 4 MG/ML
INJECTION, SOLUTION INTRA-ARTICULAR; INTRALESIONAL; INTRAMUSCULAR; INTRAVENOUS; SOFT TISSUE PRN
Status: DISCONTINUED | OUTPATIENT
Start: 2023-06-21 | End: 2023-06-21

## 2023-06-21 RX ORDER — ROSUVASTATIN CALCIUM 10 MG/1
10 TABLET, COATED ORAL DAILY
Status: ON HOLD | COMMUNITY
Start: 2022-12-29 | End: 2023-10-22

## 2023-06-21 RX ORDER — FUROSEMIDE 20 MG
20 TABLET ORAL DAILY
Status: DISCONTINUED | OUTPATIENT
Start: 2023-06-22 | End: 2023-06-22 | Stop reason: HOSPADM

## 2023-06-21 RX ORDER — HYDROMORPHONE HCL IN WATER/PF 6 MG/30 ML
0.2 PATIENT CONTROLLED ANALGESIA SYRINGE INTRAVENOUS EVERY 5 MIN PRN
Status: DISCONTINUED | OUTPATIENT
Start: 2023-06-21 | End: 2023-06-21 | Stop reason: HOSPADM

## 2023-06-21 RX ORDER — ACETAMINOPHEN 325 MG/1
975 TABLET ORAL EVERY 8 HOURS
Status: DISCONTINUED | OUTPATIENT
Start: 2023-06-21 | End: 2023-06-22 | Stop reason: HOSPADM

## 2023-06-21 RX ORDER — DIPHENHYDRAMINE HCL 12.5 MG/5ML
12.5 SOLUTION ORAL EVERY 6 HOURS PRN
Status: DISCONTINUED | OUTPATIENT
Start: 2023-06-21 | End: 2023-06-22 | Stop reason: HOSPADM

## 2023-06-21 RX ORDER — LISINOPRIL AND HYDROCHLOROTHIAZIDE 20; 25 MG/1; MG/1
1 TABLET ORAL 2 TIMES DAILY
Status: DISCONTINUED | OUTPATIENT
Start: 2023-06-22 | End: 2023-06-22 | Stop reason: HOSPADM

## 2023-06-21 RX ADMIN — SENNOSIDES AND DOCUSATE SODIUM 1 TABLET: 50; 8.6 TABLET ORAL at 20:14

## 2023-06-21 RX ADMIN — ACETAMINOPHEN 975 MG: 325 TABLET, FILM COATED ORAL at 10:08

## 2023-06-21 RX ADMIN — PHENYLEPHRINE HYDROCHLORIDE 100 MCG: 10 INJECTION INTRAVENOUS at 12:44

## 2023-06-21 RX ADMIN — ONDANSETRON 4 MG: 2 INJECTION INTRAMUSCULAR; INTRAVENOUS at 12:23

## 2023-06-21 RX ADMIN — LIDOCAINE HYDROCHLORIDE 0.1 ML: 10 INJECTION, SOLUTION EPIDURAL; INFILTRATION; INTRACAUDAL; PERINEURAL at 10:36

## 2023-06-21 RX ADMIN — PHENYLEPHRINE HYDROCHLORIDE 100 MCG: 10 INJECTION INTRAVENOUS at 12:51

## 2023-06-21 RX ADMIN — CEFAZOLIN SODIUM 2 G: 2 INJECTION, SOLUTION INTRAVENOUS at 20:14

## 2023-06-21 RX ADMIN — LIDOCAINE HYDROCHLORIDE 3 ML: 10 INJECTION, SOLUTION INFILTRATION; PERINEURAL at 12:17

## 2023-06-21 RX ADMIN — PHENYLEPHRINE HYDROCHLORIDE 100 MCG: 10 INJECTION INTRAVENOUS at 13:21

## 2023-06-21 RX ADMIN — Medication 3 G: at 12:10

## 2023-06-21 RX ADMIN — DEXAMETHASONE SODIUM PHOSPHATE 4 MG: 4 INJECTION, SOLUTION INTRA-ARTICULAR; INTRALESIONAL; INTRAMUSCULAR; INTRAVENOUS; SOFT TISSUE at 12:22

## 2023-06-21 RX ADMIN — OXYCODONE HYDROCHLORIDE 10 MG: 5 TABLET ORAL at 17:10

## 2023-06-21 RX ADMIN — TRANEXAMIC ACID 1950 MG: 650 TABLET ORAL at 10:09

## 2023-06-21 RX ADMIN — LIDOCAINE HYDROCHLORIDE 10 ML: 10 INJECTION, SOLUTION INFILTRATION; PERINEURAL at 12:21

## 2023-06-21 RX ADMIN — SODIUM CHLORIDE, POTASSIUM CHLORIDE, SODIUM LACTATE AND CALCIUM CHLORIDE: 600; 310; 30; 20 INJECTION, SOLUTION INTRAVENOUS at 14:12

## 2023-06-21 RX ADMIN — PROPOFOL 50 MCG/KG/MIN: 10 INJECTION, EMULSION INTRAVENOUS at 12:21

## 2023-06-21 RX ADMIN — GLYCOPYRROLATE 0.1 MG: 0.2 INJECTION, SOLUTION INTRAMUSCULAR; INTRAVENOUS at 12:12

## 2023-06-21 RX ADMIN — ACETAMINOPHEN 975 MG: 325 TABLET, FILM COATED ORAL at 17:10

## 2023-06-21 RX ADMIN — PHENYLEPHRINE HYDROCHLORIDE 100 MCG: 10 INJECTION INTRAVENOUS at 13:11

## 2023-06-21 RX ADMIN — ONDANSETRON 4 MG: 4 TABLET, ORALLY DISINTEGRATING ORAL at 17:10

## 2023-06-21 RX ADMIN — EPINEPHRINE 0.2 ML: 0.1 INJECTION INTRACARDIAC; INTRAVENOUS at 12:19

## 2023-06-21 RX ADMIN — FAMOTIDINE 20 MG: 20 TABLET, FILM COATED ORAL at 20:14

## 2023-06-21 RX ADMIN — MIDAZOLAM 2 MG: 1 INJECTION INTRAMUSCULAR; INTRAVENOUS at 12:10

## 2023-06-21 RX ADMIN — OXYCODONE HYDROCHLORIDE 10 MG: 5 TABLET ORAL at 23:31

## 2023-06-21 RX ADMIN — MAGNESIUM SULFATE HEPTAHYDRATE 2 G: 40 INJECTION, SOLUTION INTRAVENOUS at 12:29

## 2023-06-21 RX ADMIN — DIPHENHYDRAMINE HYDROCHLORIDE 25 MG: 50 INJECTION, SOLUTION INTRAMUSCULAR; INTRAVENOUS at 12:23

## 2023-06-21 RX ADMIN — SODIUM CHLORIDE, POTASSIUM CHLORIDE, SODIUM LACTATE AND CALCIUM CHLORIDE 1000 ML: 600; 310; 30; 20 INJECTION, SOLUTION INTRAVENOUS at 10:36

## 2023-06-21 RX ADMIN — SODIUM CHLORIDE, POTASSIUM CHLORIDE, SODIUM LACTATE AND CALCIUM CHLORIDE: 600; 310; 30; 20 INJECTION, SOLUTION INTRAVENOUS at 15:45

## 2023-06-21 RX ADMIN — BUPIVACAINE HYDROCHLORIDE IN DEXTROSE 1.8 ML: 7.5 INJECTION, SOLUTION SUBARACHNOID at 12:19

## 2023-06-21 RX ADMIN — GLYCOPYRROLATE 0.1 MG: 0.2 INJECTION, SOLUTION INTRAMUSCULAR; INTRAVENOUS at 12:10

## 2023-06-21 RX ADMIN — MIDAZOLAM 2 MG: 1 INJECTION INTRAMUSCULAR; INTRAVENOUS at 12:12

## 2023-06-21 RX ADMIN — FENTANYL CITRATE 100 MCG: 50 INJECTION, SOLUTION INTRAMUSCULAR; INTRAVENOUS at 12:12

## 2023-06-21 ASSESSMENT — ACTIVITIES OF DAILY LIVING (ADL)
ADLS_ACUITY_SCORE: 19

## 2023-06-21 ASSESSMENT — LIFESTYLE VARIABLES: TOBACCO_USE: 1

## 2023-06-21 NOTE — ANESTHESIA PROCEDURE NOTES
"Intrathecal injection Procedure Note    Pre-Procedure   Staff -        CRNA: Luciana Jackson APRN CRNA       Performed By: CRNA       Location: OR       Procedure Start/Stop Times: 6/21/2023 12:14 PM and 6/21/2023 12:19 PM       Pre-Anesthestic Checklist: patient identified, IV checked, risks and benefits discussed, informed consent, monitors and equipment checked, pre-op evaluation and at physician/surgeon's request  Timeout:       Correct Patient: Yes        Correct Procedure: Yes        Correct Site: Yes        Correct Position: Yes   Procedure Documentation  Procedure: intrathecal injection       Diagnosis: right knee DJD       Patient Position: sitting       Patient Prep/Sterile Barriers: sterile gloves, mask, patient draped       Skin prep: Betadine       Insertion Site: L3-4. (midline approach).       Needle Gauge: 25.        Needle Length (Inches): 3.5        Spinal Needle Type: Pencan       Introducer used       Introducer: 20 G       # of attempts: 1 and  # of redirects:  1    Assessment/Narrative         Paresthesias: No.       CSF fluid: clear.       Opening pressure was cmH2O while  Sitting.      Medication(s) Administered   Medication Administration Time: 6/21/2023 12:14 PM      FOR [facility] ([facility] ONLY:   Pain Team Contact information: please page the Pain Team Via [employer]. Search \"Pain\". During daytime hours, please page the attending first. At night please page the resident first.      "

## 2023-06-21 NOTE — PROGRESS NOTES
[place] [facility] [place]    Patient admitted to room 2309 at approximately 1515 via cart from surgery. Patient was accompanied by transport tech.     Verbal SBAR report received from Genie prior to patient arrival.     Patient trasferred to bed via air marcelo. Patient alert and oriented X 3. The patient is not having any pain.  . Admission vital signs: Blood pressure 114/65, pulse 60, temperature 97.4  F (36.3  C), temperature source Oral, resp. rate 16, height 1.905 m (6' 3\"), weight 140.6 kg (310 lb), SpO2 94 %. Patient was oriented to plan of care, visiting hours.     Risk Assessment    The following safety risks were identified during admission: fall and skin. Yellow risk band applied: YES.     Skin Initial Assessment    This writer admitted this patient and completed a full skin assessment and Jas score in the Adult PCS flowsheet. Appropriate interventions initiated as needed.     Secondary skin check completed by Durga Hess RN    Jas Risk Assessment  Sensory Perception: 3-->slightly limited  Moisture: 3-->occasionally moist  Activity: 3-->walks occasionally  Mobility: 3-->slightly limited  Nutrition: 3-->adequate  Friction and Shear: 3-->no apparent problem  Jas Score: 18  Moisture Interventions: Encourage regular toileting  Mattress: Standard gel/foam mattress (IsoFlex, Atmos Air, etc.)  Bed Frame: Bed extender/Extended length    Education    Patient has a Avinger to Observation order: No  Observation education completed and documented: N/A      Renee Duncan RN    "

## 2023-06-21 NOTE — PROVIDER NOTIFICATION
06/21/23 1036   Discharge Planning   Patient/Family Anticipates Transition to home with family   Concerns to be Addressed denies needs/concerns at this time   Living Arrangements   People in Home spouse   Type of Residence Private Residence   Is your private residence a single family home or apartment? Single family home   Number of Stairs, Within Home, Primary none   Stair Railings, Within Home, Primary railings safe and in good condition  (to basement)   Once home, are you able to live on one level? Yes   Which level? Main Level   Bathroom Shower/Tub Walk-in shower   Equipment Currently Used at Home walker, standard;cane, straight;raised toilet seat   Support System   Support Systems Spouse/Significant Other   Do you have someone available to stay with you one or two nights once you are home? Yes   Education   Has the patient scheduled or completed pre-op total joint education, either in class or online, in the last 12 months? Yes   Patient attended total joint pre-op class/received pre-op teaching  online  (booklet)   Relationship/Living Environment   Name(s) of People in Home genaro-spouse

## 2023-06-21 NOTE — PROGRESS NOTES
Skin affirmation note    Admitting nurse completed full skin assessment, Jas score and Jas interventions. This writer agrees with the initial skin assessment findings.

## 2023-06-21 NOTE — ANESTHESIA POSTPROCEDURE EVALUATION
Patient: Jorge Alberto Frye    Procedure: Procedure(s):  Right Total Knee Arthroplasty       Anesthesia Type:  Spinal    Note:  Disposition: Inpatient; Admission   Postop Pain Control: Uneventful            Sign Out: Well controlled pain   PONV: No   Neuro/Psych: Uneventful            Sign Out: Acceptable/Baseline neuro status   Airway/Respiratory: Uneventful            Sign Out: Acceptable/Baseline resp. status   CV/Hemodynamics: Uneventful            Sign Out: Acceptable CV status; No obvious hypovolemia; No obvious fluid overload   Other NRE: NONE   DID A NON-ROUTINE EVENT OCCUR? No           Last vitals:  Vitals Value Taken Time   /58 06/21/23 1401   Temp     Pulse 72 06/21/23 1404   Resp 27 06/21/23 1404   SpO2 95 % 06/21/23 1404   Vitals shown include unvalidated device data.    Electronically Signed By: MAME Calix CRNA  June 21, 2023  2:05 PM

## 2023-06-21 NOTE — OP NOTE
Total Knee Arthroplasty Operative Note        PLAN:  Weight bearing status: Weight bearing as tolerated   Activity: Activity as tolerated  Patient may move about with assist as indicated or with supervision   Anticoagulation plan:                 ASA 325mg po qd  for 42 days  Follow up plan                           Follow up in 2 week(s)        Name: Jorge Alberto Frye    PCP: Rich Lloyd    Procedure Date: 6/21/2023    Pre-operative diagnosis: Osteoarthritis [M19.90]   Post-operative diagnosis: Same   Procedure: Total knee arthoplasty (Right)   Surgeon: Jorge Alberto Jurado MD     Assistant(s): Jese Bello PA-C   Anesthesia: Spinal Anesthesia   Estimated blood loss: Less than 50 ml   Drains: HV x1   Specimens: None       Findings: See full dictated operative note for details   Complications: None       Comments: See dictated operative report for full details       Indications:    The patient has experienced progressive right knee pain despite use of  Analgesics, NSAID's, Injection therapy and physical therapy. Because of the failure of these therapies to control symptoms and limited walking, night pain and altered activities the patient has decided to move ahead with joint replacement surgery.    Procedure and Findings:    After being informed of risks, benefits, alternatives to the procedure, patient desired to proceed, brought to the operating suite where they were placed under spinal anesthetic. Patient received 2 grams of intravenous Ancef.  Jese Bello PA-C was present for the entire length of the case for the purposes of proper patient positioning, surgical exposure, and patient safety. A time-out verification step was completed.    Examination of the joint surfaces demonstrated full thickness cartilage loss involving themedial compartments of the right knee.    A midline incision, minimally invasive approach was utilized. A para-patellar arthrotomy was performed. Attention was first directed to  the patella. The patella was everted. It was measured at 38 mm. It was resected, remeasured and a 38 mm asymmetric patella was positioned. A protector plate was placed on the patella. Attention was directed toward the distal femur. IM guider sheila was placed. An 12 mm 5 degree distal femoral cut was completed. The IM guide sheila was then placed in the tibia. External guide sheila and tower were utilized and 9 mm button stylus was referenced off the lateral tibial plateau and cuts were completed. The tibia was sized to a 6. Attention was directed towards the distal femur. It was sized to a 7. The 4-in-1 cutting block was placed along the epicondylar axis. It was secured with 2 pins, anterior, posterior and chamfer cuts were completed. Soft tissue balancing was then carried out. Medial release was completed. Ultimately a 12 mm insert gave excellent range of motion and stability throughout the range of motion. Patella was noted to track symmetrically throughout the range to motion. The tibial tray rotation was marked, size was verified, it was secured with 2 pins and punched. Trial components were then removed. The cancellous surfaces were pulsatile lavaged. One batch of Simplex cement was mixed under vacuum. The tibia, femur and patella were sequentially cemented in place. The knee was held in extension with axial compression until the cement had hardened. The 12 mm insert was ultimately selected and secured Care was taken to remove any excess cement. Joint capsular injection with anesthetic solution was performed. Excellent range of motion and stability was demonstrated. A None drain was placed. The joint was copiously irrigated. Joint capsules were closed with interrupted number 1 running Stratafix. Subcutaneous tissues were closed with 2-0 Vicryl and skin was closed with 3-0 running Stratifix for wound closure and Aquacell. A sterile dressing was applied. Patient tolerated the procedure well without complication and  returned to the PAR in stable condition.      Jorge Alberto Jurado MD    Date: 6/21/2023 Time: 1:46 PM  ?    CONFIDENTIALITY NOTICE This message and any included attachments are from [facility] and are intended only for the addressee. The information contained in this message is confidential and may constitute inside or non-public information under international, federal, or state securities laws. Unauthorized forwarding, printing, copying, distribution, or use of such information is strictly prohibited and may be unlawful. If you are not the addressee, please promptly delete this message and notify the sender of the delivery error by e-mail.

## 2023-06-21 NOTE — MEDICATION SCRIBE - ADMISSION MEDICATION HISTORY
Medication Scribe Admission Medication History    Admission medication history is complete. The information provided in this note is only as accurate as the sources available at the time of the update.    Medication reconciliation/reorder completed by provider prior to medication history? No    Information Source(s): Patient via in-person    Pertinent Information: ASA held for surgery    Changes made to PTA medication list:    Added: asa, lasix, metoprolol, crestor,     Deleted: None    Changed: amlodipine 5 mg to 10 mg    Medication Affordability:  Not including over the counter (OTC) medications, was there a time in the past 3 months when you did not take your medications as prescribed because of cost?: No    Allergies reviewed with patient and updates made in EHR: yes    Medication History Completed By: India Ortiz 6/21/2023 10:16 AM    Prior to Admission medications    Medication Sig Last Dose Taking? Auth Provider Long Term End Date   acetaminophen (TYLENOL) 325 MG tablet Take 2 tablets (650 mg) by mouth every 4 hours as needed for mild pain More than a month at unknown Yes Shawnee Hathaway PA-C     albuterol (2.5 MG/3ML) 0.083% neb solution Inhale 2.5 mg into the lungs every 4 hours as needed More than a month at unknown Yes Reported, Patient Yes    albuterol (PROAIR HFA, PROVENTIL HFA, VENTOLIN HFA) 108 (90 BASE) MCG/ACT inhaler Inhale 1-2 puffs into the lungs every 4 hours as needed  6/20/2023 at pm Yes Reported, Patient Yes    amLODIPine (NORVASC) 10 MG tablet Take 1 tablet by mouth daily 6/20/2023 at am Yes Reported, Patient Yes    aspirin 81 MG EC tablet Take 81 mg by mouth daily 6/11/2023 at am Yes Reported, Patient No    fluticasone-salmeterol (ADVAIR) 250-50 MCG/DOSE diskus inhaler Inhale 1 puff into the lungs 2 times daily 6/21/2023 at am Yes Reported, Patient No    furosemide (LASIX) 20 MG tablet TAKE ONE-HALF TABLET BY  MOUTH IN THE MORNING 6/20/2023 at am Yes Reported, Patient Yes     garlic 150 MG TABS Take 150 mg by mouth daily Past Week at unknown Yes Reported, Patient     lisinopril-hydrochlorothiazide (PRINZIDE,ZESTORETIC) 20-25 MG per tablet Take 1 tablet by mouth 2 times daily 6/20/2023 at am Yes Reported, Patient No    metoprolol succinate ER (TOPROL XL) 100 MG 24 hr tablet Take 1 tablet by mouth daily 6/21/2023 at am Yes Reported, Patient Yes    MULTIPLE VITAMINS PO Take 1 tablet by mouth daily  6/20/2023 at am Yes Reported, Patient     Omega-3 Fatty Acids (OMEGA-3 FISH OIL PO) Take 2 g by mouth daily  Past Week at unknown Yes Reported, Patient     Omeprazole Magnesium (PRILOSEC OTC PO) Take 20 mg by mouth daily  6/21/2023 at am Yes Reported, Patient     rosuvastatin (CRESTOR) 10 MG tablet Take 10 mg by mouth daily 6/21/2023 at am Yes Reported, Patient Yes    tadalafil (CIALIS) 5 MG tablet Take 1 tablet by mouth daily as needed for erectile dysfunction Take 30 minutes before sexual activity 6/21/2023 at am Yes Reported, Patient Yes    VITAMIN D, CHOLECALCIFEROL, PO Take 5,000 Units by mouth daily 6/21/2023 at am Yes Reported, Patient

## 2023-06-21 NOTE — BRIEF OP NOTE
[facility]    Brief Operative Note    Pre-operative diagnosis: Osteoarthritis [M19.90]  Post-operative diagnosis Same as pre-operative diagnosis    Procedure: Procedure(s):  Right Total Knee Arthroplasty  Surgeon: Surgeon(s) and Role:     * Jorge Alberto Jurado MD - Primary     * Jese Bello PA-C - Assisting  Anesthesia: Spinal   Estimated Blood Loss: Less than 50 ml    Drains: Hemovac  Specimens: * No specimens in log *  Findings:   None.  Complications: None.  Implants:   Implant Name Type Inv. Item Serial No.  Lot No. LRB No. Used Action   BONE CEMENT RADIOPAQUE SIMPLEX HV FULL DOSE 6194-1-001 - [id number] Cement, Bone BONE CEMENT RADIOPAQUE SIMPLEX HV FULL DOSE 6194-1-001  [employer] [id number] Right 2 Implanted   IMP BASEPLATE TIBIAL HOWM TRI 6 5520-B-600 - [id number] Total Joint Component/Insert IMP BASEPLATE TIBIAL HOWM TRI 6 5520-B-600  [employer] [id number] Right 1 Implanted   IMP COMP FEM STRK TRIATHLN PS RT 7 5515-F-702 - [id number] Total Joint Component/Insert IMP COMP FEM STRK TRIATHLN PS RT 7 5515-F-702  [employer] [id number] Right 1 Implanted   Triathlon Asymmetric Patella Total Joint Component/Insert  A38MM ERNESTO, THKNS 11MM [employer] [id number] Right 1 Implanted   INSERT TIB 6 12MM KN X3 POST STAB BRNG TRTHLN STRL LF - [id number] Total Joint Component/Insert INSERT TIB 6 12MM KN X3 POST STAB BRNG TRTHLN STRL LF  [employer] [id number] Right 1 Implanted

## 2023-06-21 NOTE — ANESTHESIA CARE TRANSFER NOTE
Patient: Jorge Alberto Frye    Procedure: Procedure(s):  Right Total Knee Arthroplasty       Diagnosis: Osteoarthritis [M19.90]  Diagnosis Additional Information: No value filed.    Anesthesia Type:   Spinal     Note:    Oropharynx: oropharynx clear of all foreign objects and spontaneously breathing  Level of Consciousness: drowsy  Oxygen Supplementation: nasal cannula  Level of Supplemental Oxygen (L/min / FiO2): 3  Independent Airway: airway patency satisfactory and stable  Dentition: dentition unchanged  Vital Signs Stable: post-procedure vital signs reviewed and stable  Report to RN Given: handoff report given  Patient transferred to: PACU    Handoff Report: Identifed the Patient, Identified the Reponsible Provider, Reviewed the pertinent medical history, Discussed the surgical course, Reviewed Intra-OP anesthesia mangement and issues during anesthesia, Set expectations for post-procedure period and Allowed opportunity for questions and acknowledgement of understanding      Vitals:  Vitals Value Taken Time   BP     Temp     Pulse 69 06/21/23 1402   Resp 13 06/21/23 1402   SpO2 97 % 06/21/23 1402   Vitals shown include unvalidated device data.    Electronically Signed By: MAME Calix CRNA  June 21, 2023  2:03 PM

## 2023-06-21 NOTE — PROGRESS NOTES
[facility] SERVICES  [facility] - Surgery    Referral Source:Patient Request during Pre-Op Phone Call    - David was ready and waiting for his surgery.  He remembered our visits from some of his past surgeries.  His wife, Jyotsna, was also present.      - I commented on his previous visits to [place], from a previous note, and David brought me up to speed on how things have changed in the past years.  Jyotsna spoke of their other travels to different parts of the world.  They enjoy travel together.    - Dr Jurado was expected soon so we took time to pray together for a successful surgery experience.      Plan:  will remain available for any ongoing support needs during LOS.    Gilbert Springer M.A., [facility]  Lead Chaplain [facility]  Office: [phone]

## 2023-06-22 ENCOUNTER — APPOINTMENT (OUTPATIENT)
Dept: PHYSICAL THERAPY | Facility: CLINIC | Age: 73
End: 2023-06-22
Attending: ORTHOPAEDIC SURGERY
Payer: MEDICARE

## 2023-06-22 VITALS
DIASTOLIC BLOOD PRESSURE: 65 MMHG | TEMPERATURE: 97.6 F | RESPIRATION RATE: 18 BRPM | SYSTOLIC BLOOD PRESSURE: 130 MMHG | WEIGHT: 310 LBS | OXYGEN SATURATION: 93 % | BODY MASS INDEX: 38.54 KG/M2 | HEART RATE: 62 BPM | HEIGHT: 75 IN

## 2023-06-22 LAB
ANION GAP SERPL CALCULATED.3IONS-SCNC: 10 MMOL/L (ref 7–15)
BUN SERPL-MCNC: 22.1 MG/DL (ref 8–23)
CALCIUM SERPL-MCNC: 8.4 MG/DL (ref 8.8–10.2)
CHLORIDE SERPL-SCNC: 102 MMOL/L (ref 98–107)
CREAT SERPL-MCNC: 1.29 MG/DL (ref 0.67–1.17)
DEPRECATED HCO3 PLAS-SCNC: 25 MMOL/L (ref 22–29)
GFR SERPL CREATININE-BSD FRML MDRD: 58 ML/MIN/1.73M2
GLUCOSE SERPL-MCNC: 128 MG/DL (ref 70–99)
HGB BLD-MCNC: 13.3 G/DL (ref 13.3–17.7)
POTASSIUM SERPL-SCNC: 3.8 MMOL/L (ref 3.4–5.3)
SODIUM SERPL-SCNC: 137 MMOL/L (ref 136–145)

## 2023-06-22 PROCEDURE — 97161 PT EVAL LOW COMPLEX 20 MIN: CPT | Mod: GP

## 2023-06-22 PROCEDURE — 250N000011 HC RX IP 250 OP 636: Mod: JZ | Performed by: ORTHOPAEDIC SURGERY

## 2023-06-22 PROCEDURE — 80048 BASIC METABOLIC PNL TOTAL CA: CPT | Performed by: PHYSICIAN ASSISTANT

## 2023-06-22 PROCEDURE — 97110 THERAPEUTIC EXERCISES: CPT | Mod: GP

## 2023-06-22 PROCEDURE — 99204 OFFICE O/P NEW MOD 45 MIN: CPT

## 2023-06-22 PROCEDURE — 85018 HEMOGLOBIN: CPT | Performed by: ORTHOPAEDIC SURGERY

## 2023-06-22 PROCEDURE — 36415 COLL VENOUS BLD VENIPUNCTURE: CPT | Performed by: PHYSICIAN ASSISTANT

## 2023-06-22 PROCEDURE — 250N000013 HC RX MED GY IP 250 OP 250 PS 637: Performed by: ORTHOPAEDIC SURGERY

## 2023-06-22 PROCEDURE — 250N000013 HC RX MED GY IP 250 OP 250 PS 637: Performed by: PHYSICIAN ASSISTANT

## 2023-06-22 PROCEDURE — 97116 GAIT TRAINING THERAPY: CPT | Mod: GP

## 2023-06-22 RX ADMIN — ROSUVASTATIN CALCIUM 10 MG: 5 TABLET, FILM COATED ORAL at 08:42

## 2023-06-22 RX ADMIN — METOPROLOL SUCCINATE 100 MG: 100 TABLET, EXTENDED RELEASE ORAL at 08:42

## 2023-06-22 RX ADMIN — OXYCODONE HYDROCHLORIDE 10 MG: 5 TABLET ORAL at 04:12

## 2023-06-22 RX ADMIN — CEFAZOLIN SODIUM 2 G: 2 INJECTION, SOLUTION INTRAVENOUS at 04:12

## 2023-06-22 RX ADMIN — ACETAMINOPHEN 975 MG: 325 TABLET, FILM COATED ORAL at 02:14

## 2023-06-22 RX ADMIN — ACETAMINOPHEN 975 MG: 325 TABLET, FILM COATED ORAL at 09:59

## 2023-06-22 RX ADMIN — LISINOPRIL AND HYDROCHLOROTHIAZIDE 1 TABLET: 25; 20 TABLET ORAL at 08:47

## 2023-06-22 RX ADMIN — FUROSEMIDE 20 MG: 20 TABLET ORAL at 08:46

## 2023-06-22 RX ADMIN — ASPIRIN 325 MG: 325 TABLET, COATED ORAL at 08:42

## 2023-06-22 RX ADMIN — FAMOTIDINE 20 MG: 20 TABLET, FILM COATED ORAL at 08:42

## 2023-06-22 RX ADMIN — PANTOPRAZOLE SODIUM 40 MG: 40 TABLET, DELAYED RELEASE ORAL at 08:42

## 2023-06-22 RX ADMIN — POLYETHYLENE GLYCOL 3350 17 G: 17 POWDER, FOR SOLUTION ORAL at 08:43

## 2023-06-22 RX ADMIN — SENNOSIDES AND DOCUSATE SODIUM 1 TABLET: 50; 8.6 TABLET ORAL at 08:42

## 2023-06-22 RX ADMIN — AMLODIPINE BESYLATE 10 MG: 10 TABLET ORAL at 08:42

## 2023-06-22 ASSESSMENT — ACTIVITIES OF DAILY LIVING (ADL)
ADLS_ACUITY_SCORE: 19

## 2023-06-22 NOTE — PROGRESS NOTES
06/22/23 0839   Appointment Info   Signing Clinician's Name / Credentials (PT) Eunice Dunaway, PT   Quick Adds   Quick Adds Certification   Living Environment   People in Home spouse   Current Living Arrangements house   Home Accessibility stairs to enter home   Number of Stairs, Main Entrance 3   Stair Railings, Main Entrance railings safe and in good condition   Living Environment Comments Plans to have son-in-law assist with stairs to enter home   Self-Care   Equipment Currently Used at Home walker, standard;cane, straight;raised toilet seat   Fall history within last six months no   Activity/Exercise/Self-Care Comment Pt typically indep with mobility without device, ADL's, and IADL's. Spouse able to assist   General Information   Onset of Illness/Injury or Date of Surgery 06/21/23   Referring Physician Jorge Alberto Jurado MD   Patient/Family Therapy Goals Statement (PT) Return home today   Pertinent History of Current Problem (include personal factors and/or comorbidities that impact the POC) 72 y.o. male s/p R TKA performed 6/21, now WBAT without restrictions.   Weight-Bearing Status - RLE weight-bearing as tolerated   Cognition   Affect/Mental Status (Cognition) WFL   Orientation Status (Cognition) oriented x 4   Follows Commands (Cognition) WFL   Pain Assessment   Patient Currently in Pain Yes, see Vital Sign flowsheet  (R knee pain)   Range of Motion (ROM)   Range of Motion ROM deficits secondary to surgical procedure;ROM deficits secondary to pain   Strength (Manual Muscle Testing)   Strength Comments Strength limited by R knee pain   Bed Mobility   Comment, (Bed Mobility) supine<>sit with indep   Transfers   Comment, (Transfers) sit>stand from EOB with Villa and 2WW, good standing balance   Gait/Stairs (Locomotion)   [place] Level (Gait) contact guard   Assistive Device (Gait) walker, front-wheeled   Distance in Feet 25   Distance in Feet (Gait) 100   Pattern (Gait) step-to   Deviations/Abnormal  Patterns (Gait) antalgic;gait speed decreased;weight shifting decreased   Maintains Weight-bearing Status (Gait) able to maintain   Negotiation (Stairs) stairs independence;handrail location;number of steps;ascending technique;descending technique   [place] Level (Stairs) supervision   Handrail Location (Stairs) both sides   Number of Steps (Stairs) 2   Ascending Technique (Stairs) step-to-step   Descending Technique (Stairs) step-to-step   Clinical Impression   Criteria for Skilled Therapeutic Intervention Yes, treatment indicated   PT Diagnosis (PT) impaired mobility s/p R TKA   Influenced by the following impairments pain, LE weakness, reduced ROM   Functional limitations due to impairments impaired gait, stairs   Clinical Presentation (PT Evaluation Complexity) Stable/Uncomplicated   Clinical Presentation Rationale clinical reasoning   Clinical Decision Making (Complexity) low complexity   Planned Therapy Interventions (PT) cryotherapy;bed mobility training;gait training;home exercise program;patient/family education;ROM (range of motion);stair training;strengthening;transfer training   Anticipated Equipment Needs at Discharge (PT)   (has all equipment)   Risk & Benefits of therapy have been explained evaluation/treatment results reviewed;care plan/treatment goals reviewed;risks/benefits reviewed;current/potential barriers reviewed;participants voiced agreement with care plan;patient;participants included   PT Total Evaluation Time   PT Eval, Low Complexity Minutes (58469) 10   Therapy Certification   Start of care date 06/22/23   Certification date from 06/22/23   Certification date to 06/22/23   Medical Diagnosis s/p R TKA   Physical Therapy Goals   PT Frequency One time eval and treatment only   PT Predicted Duration/Target Date for Goal Attainment 06/22/23   PT Goals Gait;Stairs;PT Goal 1   PT: Gait Supervision/stand-by assist;Standard walker;100 feet;Goal Met   PT: Stairs Supervision/stand-by assist;2  stairs;Rail on both sides;Goal Met   PT: Goal 1 Pt will be indep in R TKA HEP in order to progress aftercares. Goal met.   Interventions   Interventions Quick Adds Gait Training;Therapeutic Procedure   Therapeutic Procedure/Exercise   Ther. Procedure: strength, endurance, ROM, flexibillity Minutes (55056) 10   Symptoms Noted During/After Treatment fatigue   Treatment Detail/Skilled Intervention Reviewed R TKA HEP following printed handout x10 reps each: ankle pumps, quad sets, glute sets, hamstring sets, heel slides, SAQ, hip abd, and SLR. PT voiced understanding of exercises.   Gait Training   Gait Training Minutes (57968) 15   Symptoms Noted During/After Treatment (Gait Training) increased pain   Treatment Detail/Skilled Intervention Amb x100 feet with 2WW and Villa, cues for step through and heel strike with good carryover. Good balance, increased pain with increased distance. Up/down 2 stairs with HRA and SBA, cues for sequencing with stairs to reduce pain with good carryover.   [place] Level (Gait Training) stand-by assist   Physical Assistance Level (Gait Training) verbal cues;supervision   Weight Bearing (Gait Training) weight-bearing as tolerated   Assistive Device (Gait Training) standard walker   Gait Analysis Deviations decreased jolanta;decreased stride length;decreased weight-shifting ability   Impairments (Gait Analysis/Training) pain;ROM decreased;strength decreased   Stair Railings present at both sides   Physical Assist/Nonphysical Assist (Stairs) supervision;verbal cues   Level of [place] (Stairs) stand-by assist   PT Discharge Planning   PT Plan d/c PT, goals met   PT Discharge Recommendation (DC Rec) home with outpatient physical therapy   PT Rationale for DC Rec rec OP PT to progress R TKA aftercares   PT Brief overview of current status amb 125 feet with 2WW and Villa, up/down 2 stairs   Total Session Time   Timed Code Treatment Minutes 25   Total Session Time (sum of timed and untimed  services) 35   [facility]  OUTPATIENT PHYSICAL THERAPY EVALUATION  PLAN OF TREATMENT FOR OUTPATIENT REHABILITATION  (COMPLETE FOR INITIAL CLAIMS ONLY)  Patient's Last Name, First Name, M.I.  YOB: 1950  Jorge Alberto Frye                        Provider's Name  [facility] Medical Record No.  [id number]                             Onset Date:  06/21/23   Start of Care Date:  06/22/23   Type:     _X_PT   ___OT   ___SLP Medical Diagnosis:  s/p R TKA              PT Diagnosis:  impaired mobility s/p R TKA Visits from SOC:  1     See note for plan of treatment, functional goals and certification details  Jorge Alberto Jurado MD  I CERTIFY THE NEED FOR THESE SERVICES FURNISHED UNDER        THIS PLAN OF TREATMENT AND WHILE UNDER MY CARE     (Physician co-signature of this document indicates review and certification of the therapy plan).

## 2023-06-22 NOTE — PLAN OF CARE
[facility] DISCHARGE NOTE    Patient discharged to home at 11:49 AM via wheel chair. Accompanied by spouse and staff. Discharge instructions reviewed with patient and spouse, opportunity offered to ask questions. Prescriptions sent to patients preferred pharmacy. All belongings sent with patient.    Corry Dodd RN

## 2023-06-22 NOTE — PROGRESS NOTES
[facility] Medicine Progress Note  Date of Service: 06/22/2023    Assessment & Plan   Jorge Alberto Frye is a 72 year old male who presented on 6/21/2023 for scheduled Procedure(s):  Right Total Knee Arthroplasty by Jorge Alberto Jurado MD and is being followed by the hospital medicine service for co-management of acute and/or chronic perioperative medical problems.      S/p Procedure(s):  Right Total Knee Arthroplasty   1 Day Post-Op    - pain control, wound cares, physical therapy, occupational therapy and DVT prophylaxis per orthopedic surgery service    Renal insufficiency  Preoperative creatinine of 1.13 with postop increase of 1.29.  Does not qualify as acute kidney injury.  Discussed with patient to follow-up with primary care in 1 week to have rechecked to ensure it returns to baseline.    Hypertension  Controlled PTA with amlodipine 10 mg daily, lisinopril-hydrochlorothiazide 20-25 2 times daily, and metoprolol succinate 100 mg daily.  -Blood pressure is well controlled at this time  -Continue PTA amlodipine, lisinopril hydrochlorothiazide, and metoprolol.    Hyperlipidemia  Managed PTA with rosuvastatin 10 mg daily  -Continue PTA statin    Asthma  Asthma managed PTA with fluticasone salmeterol 250-50 mcg 2 times daily and albuterol as needed.  -As needed DuoNebs for shortness of breath or wheezing    GERD  Managed PTA with omeprazole 20 mg.  -Continue PPI daily    Principal Problem:    Right knee DJD    DVT Prophylaxis: as per orthopedic surgery service - mg x 42 days  Code Status: Full Code    Lines: PIV  Luke catheter: None    Discussion: Medically, the patient appears optimized for discharge, there are no barriers at this time.    Disposition: Anticipate discharge 06/22/23    Attestation:  I have reviewed today's vital signs, notes, medications, labs and imaging.    I have discussed, or will be discussing, the patient with hospitalist physician,   Nishi.    FREDERIC ALVES PA-C     Interval History   POD#1: Routine postoperative morning rounds.  No overnight events.  Patient states that he is feeling very good this morning.  He states ambulating last evening and this morning without any difficulty.  He feels steady on his feet without any lightheadedness or dizziness.  He is tolerating p.o. without any nausea or vomiting, stating he liked breakfast.  Passing flatus however no bowel movement yet.  Urinating without any difficulty.  He denies chest pain, shortness of breath, abdominal pain, or paresthesias.    Physical Exam   Temp:  [97.1  F (36.2  C)-97.9  F (36.6  C)] 97.6  F (36.4  C)  Pulse:  [59-77] 62  Resp:  [14-27] 18  BP: (101-157)/(58-84) 130/65  SpO2:  [90 %-96 %] 93 %    Weights:   Vitals:    06/12/23 0700 06/21/23 0938   Weight: 141.5 kg (312 lb) 140.6 kg (310 lb)    Body mass index is 38.75 kg/m .    General: Alert, oriented, sitting upright on the edge of bed, polite, interactive, no acute distress, nontoxic-appearing.  CV: Regular rate and rhythm.  Normal S1 and S2 heart sounds.  No S3, S4, or murmurs.  There is strong radial pulse bilaterally.  No lower extremity edema.  Respiratory: Lungs are clear to auscultation bilaterally.  There is no wheezes, rhonchi, or crackles.  Normal respiratory effort on room air.  Speaking in full sentences.  GI: Soft, semifirm, nontender to palpation throughout.  Bowel sounds are normoactive.  No appreciation of hepatosplenomegaly or abdominal masses.  Skin: Warm and dry  Musculoskeletal: Surgical site examination deferred to orthopedic.  Bandage over right knee clean dry and intact . Dorsiflexion and plantarflexion intact.  Sensation is intact and symmetrical bilaterally.  There is no posterior calf tenderness or pain    Data   Recent Labs   Lab 06/22/23  0522 06/21/23  0948   WBC  --  8.8   HGB 13.3 15.7   MCV  --  88   PLT  --  218     --    POTASSIUM 3.8 3.8   CHLORIDE 102  --    CO2 25  --    BUN  22.1  --    CR 1.29* 1.13   ANIONGAP 10  --    ALEXANDER 8.4*  --    *  --        Recent Labs   Lab 06/22/23  0522   *        Unresulted Labs Ordered in the Past 30 Days of this Admission     No orders found for last 31 day(s).         Imaging  Recent Results (from the past 24 hour(s))   XR Knee Port Right 1/2 Views    Narrative    KNEE ONE-TWO VIEWS RIGHT  6/21/2023 2:29 PM     HISTORY: Post-Op Total Knee  COMPARISON: None.      Impression    IMPRESSION: Status post recent right total knee arthroplasty. No  immediate hardware complication. Expected postsurgical soft tissue  edema, subcutaneous emphysema, gas-containing joint effusion, and  surgical drain. No acute fracture or malalignment.    KINGSTON DANIELS MD         SYSTEM ID:  [id number]      I reviewed all new labs and imaging results over the last 24 hours. I personally reviewed no images or EKG's today.    Medications     lactated ringers 100 mL/hr at 06/22/23 0600       acetaminophen  975 mg Oral Q8H     amLODIPine  10 mg Oral Daily     aspirin  325 mg Oral Daily     famotidine  20 mg Oral BID    Or     famotidine  20 mg Intravenous BID     furosemide  20 mg Oral Daily     lisinopril-hydrochlorothiazide  1 tablet Oral BID     metoprolol succinate ER  100 mg Oral Daily     pantoprazole  40 mg Oral Daily     polyethylene glycol  17 g Oral Daily     rosuvastatin  10 mg Oral Daily     senna-docusate  1 tablet Oral BID     sodium chloride (PF)  3 mL Intracatheter Q8H       FREDERIC ALVES PA-C

## 2023-06-22 NOTE — DISCHARGE SUMMARY
[facility] Orthopedics Discharge Summary                                  [facility], [place]     AZEB MARCANO [id number]   Age: 72 year old  PCP: Rich Lloyd, [phone] 1950     Date of Admission:  6/21/2023  Date of Discharge::  6/22/2023 11:45 AM  Discharge Physician:  Jesus Fajardo PA-C    Code status:  Full Code    Admission Information:  Admission Diagnosis:  Osteoarthritis [M19.90]  Right knee DJD [M17.11]    Post-Operative Day: 1 Day Post-Op     Reason for admission:  The patient was admitted for the following:Procedure(s) (LRB):  Right Total Knee Arthroplasty (Right)    Principal Problem:    Right knee DJD      Allergies:  Spironolactone    Following the procedure noted above the patient was transferred to the post-op floor and started on:    Therapy:  physical therapy  Anticoagulation Plan:  mg daily  for 42 days  Pain Management: oxycodone and tylenol  Weight bearing status: Weight bearing as tolerated     The patient was followed and co-managed by the hospitalist service during the inpatient treatment course  Complications:  None  Consultations:  None     Pertinent Labs   Lab Results: personally reviewed.     Recent Labs   Lab Test 06/22/23  0522 06/21/23  0948 03/01/18  0644 06/05/17  0635 06/04/17  1329 06/03/17  0905 06/02/17  0638 06/01/17  0645 05/31/17  1038 11/18/16  0622 11/17/16  0815   INR  --   --   --   --   --   --   --   --  1.04  --  0.95   HGB 13.3 15.7 12.8* 11.8*  --  12.7* 12.5*  12.4*   < > 15.3   < > 14.9   HCT  --  46.5  --  35.7*  --  38.3* 37.1*  --  45.8  --  43.7   MCV  --  88  --  91  --  92 92  --  90  --  90   PLT  --  218  --  254  --  212 189  --  249  --  236     --   --   --   --  138  --   --   --   --   --    CRP  --   --   --  111.0* 116.0*  --  79.6*  --   --   --   --     < > = values in this interval not displayed.          Discharge Information:  Condition at discharge: Stable  Discharge destination:  Discharged to home      Medications at discharge:  Discharge Medication List as of 6/22/2023 11:10 AM      START taking these medications    Details   !! acetaminophen (TYLENOL) 325 MG tablet Take 2 tablets (650 mg) by mouth every 4 hours as needed for other (mild pain), Disp-100 tablet, R-0, E-Prescribe      methocarbamol (ROBAXIN) 500 MG tablet Take 1 tablet (500 mg) by mouth 4 times daily as needed for other (pain), Disp-60 tablet, R-1, E-Prescribe      oxyCODONE (ROXICODONE) 5 MG tablet Take 1-2 tablets (5-10 mg) by mouth every 4 hours as needed for moderate to severe pain, Disp-30 tablet, R-0, E-Prescribe      senna-docusate (SENOKOT-S/PERICOLACE) 8.6-50 MG tablet Take 1-2 tablets by mouth 2 times daily Take while on oral narcotics to prevent or treat constipation., Disp-30 tablet, R-0, No Print OutWhile taking narcotics       !! - Potential duplicate medications found. Please discuss with provider.      CONTINUE these medications which have CHANGED    Details   aspirin (ASA) 325 MG EC tablet Take 1 tablet (325 mg) by mouth daily, Disp-42 tablet, R-0, No Print Out         CONTINUE these medications which have NOT CHANGED    Details   albuterol (2.5 MG/3ML) 0.083% neb solution Inhale 2.5 mg into the lungs every 4 hours as needed, Historical      albuterol (PROAIR HFA, PROVENTIL HFA, VENTOLIN HFA) 108 (90 BASE) MCG/ACT inhaler Inhale 1-2 puffs into the lungs every 4 hours as needed , Historical      amLODIPine (NORVASC) 10 MG tablet Take 1 tablet by mouth daily, Historical      fluticasone-salmeterol (ADVAIR) 250-50 MCG/DOSE diskus inhaler Inhale 1 puff into the lungs 2 times daily, Historical      furosemide (LASIX) 20 MG tablet TAKE ONE-HALF TABLET BY  MOUTH IN THE MORNING, Historical      garlic 150 MG TABS Take 150 mg by mouth daily, Historical      lisinopril-hydrochlorothiazide (PRINZIDE,ZESTORETIC) 20-25 MG per tablet Take 1 tablet by mouth 2 times daily, Historical      metoprolol succinate ER (TOPROL XL) 100 MG 24 hr tablet  Take 1 tablet by mouth daily, Historical      MULTIPLE VITAMINS PO Take 1 tablet by mouth daily , Historical      Omega-3 Fatty Acids (OMEGA-3 FISH OIL PO) Take 2 g by mouth daily , Historical      Omeprazole Magnesium (PRILOSEC OTC PO) Take 20 mg by mouth daily , Historical      rosuvastatin (CRESTOR) 10 MG tablet Take 10 mg by mouth daily, Historical      tadalafil (CIALIS) 5 MG tablet Take 1 tablet by mouth daily as needed for erectile dysfunction Take 30 minutes before sexual activity, Historical      VITAMIN D, CHOLECALCIFEROL, PO Take 5,000 Units by mouth daily, Historical      !! acetaminophen (TYLENOL) 325 MG tablet Take 2 tablets (650 mg) by mouth every 4 hours as needed for mild pain, Disp-100 tablet, R-0, E-Prescribe       !! - Potential duplicate medications found. Please discuss with provider.                     Follow-Up Care:  Patient should be seen in the office in 14 days by the Orthopedic Surgeon/Physician Assistant.  Call [phone] for appointment or questions.  MD Jesus St PA-C

## 2023-06-22 NOTE — PLAN OF CARE
Physical Therapy Discharge Summary    Reason for therapy discharge:    All goals and outcomes met, no further needs identified.    Progress towards therapy goal(s). See goals on Care Plan in [facility] electronic health record for goal details.  Goals met    Therapy recommendation(s):    Continued therapy is recommended.  Rationale/Recommendations:  Recommend continued OP PT to progress R TKA aftercares.

## 2023-06-22 NOTE — PROGRESS NOTES
"[facility] Orthopaedics Progress Note      Post-operative Day: 1 Day Post-Op    Procedure(s):  Right Total Knee Arthroplasty  Subjective:    Pt reports mild pain in the right knee, it is well controlled and he hopes to stay ahead of it. He is feeling well and has outpatient physical therapy scheduled. He hopes to go home later today.    Chest pain, SOB:  No  Nausea, vomiting:  no  Lightheadedness, dizziness:  No  Neuro:  Patient denies new onset numbness or paresthesias      Objective:  Blood pressure 130/65, pulse 62, temperature 97.6  F (36.4  C), temperature source Oral, resp. rate 18, height 1.905 m (6' 3\"), weight 140.6 kg (310 lb), SpO2 93 %.    Patient Vitals for the past 24 hrs:   BP Temp Temp src Pulse Resp SpO2 Height Weight   06/22/23 0731 130/65 97.6  F (36.4  C) Oral 62 18 93 % -- --   06/22/23 0529 126/60 97.1  F (36.2  C) Oral 65 18 93 % -- --   06/21/23 2139 (!) 146/74 97.7  F (36.5  C) Oral 74 16 96 % -- --   06/21/23 2004 (!) 157/84 97.9  F (36.6  C) Oral 77 18 94 % -- --   06/21/23 1800 133/71 -- -- 68 16 95 % -- --   06/21/23 1710 122/74 -- -- 66 -- -- -- --   06/21/23 1650 134/77 -- -- 64 -- -- -- --   06/21/23 1620 130/79 -- -- 64 -- -- -- --   06/21/23 1550 122/71 -- -- 62 -- -- -- --   06/21/23 1535 117/67 -- -- 59 -- -- -- --   06/21/23 1530 114/65 -- -- -- -- 94 % -- --   06/21/23 1517 114/59 97.4  F (36.3  C) Oral 60 16 90 % -- --   06/21/23 1503 -- -- -- -- -- 95 % -- --   06/21/23 1500 -- -- -- 59 -- 94 % -- --   06/21/23 1452 117/72 97.8  F (36.6  C) Axillary 61 -- 93 % -- --   06/21/23 1445 123/71 -- -- 61 -- 92 % -- --   06/21/23 1433 118/69 -- -- 63 20 94 % -- --   06/21/23 1430 118/69 -- -- 65 22 90 % -- --   06/21/23 1410 115/66 -- -- 67 26 95 % -- --   06/21/23 1408 106/58 -- -- 70 27 96 % -- --   06/21/23 1400 101/62 -- -- 71 14 95 % -- --   06/21/23 1359 -- 97.6  F (36.4  C) Axillary -- -- -- -- --   06/21/23 0938 (!) 180/90 98.3  F (36.8  C) -- -- 18 98 % 1.905 m (6' 3\") 140.6 " kg (310 lb)       Wt Readings from Last 4 Encounters:   06/21/23 140.6 kg (310 lb)   02/28/18 133.4 kg (294 lb)   08/03/17 124.3 kg (274 lb)   05/31/17 122.5 kg (270 lb)       Gen: A&O x 3. NAD.   Wound status: Covered, Aquacel dressing intact with mild bloody drainage distally. Hemovac present with mild bloody drainage present.   Circulation, motion and sensation: Dorsiflexion/plantarflexion intact and equal bilaterally; distal lower extremity sensation is intact and equal bilaterally. Foot and toes are warm and well perfused.    Swelling: Mild-moderate  Calf tenderness: calves are soft and non-tender bilaterally     Pertinent Labs   Lab Results: personally reviewed.     Recent Labs   Lab Test 06/22/23  0522 06/21/23  0948 03/01/18  0644 06/05/17  0635 06/04/17  1329 06/03/17  0905 06/02/17  0638 06/01/17  0645 05/31/17  1038 11/18/16  0622 11/17/16  0815   INR  --   --   --   --   --   --   --   --  1.04  --  0.95   HGB 13.3 15.7 12.8* 11.8*  --  12.7* 12.5*  12.4*   < > 15.3   < > 14.9   HCT  --  46.5  --  35.7*  --  38.3* 37.1*  --  45.8  --  43.7   MCV  --  88  --  91  --  92 92  --  90  --  90   PLT  --  218  --  254  --  212 189  --  249  --  236     --   --   --   --  138  --   --   --   --   --    CRP  --   --   --  111.0* 116.0*  --  79.6*  --   --   --   --     < > = values in this interval not displayed.       Plan:   Continue current cares and rehabilitation.  Anticoagulation protocol:  mg daily  x 42  days  Pain medications:  oxycodone, tylenol and Robaxin  Weight bearing status:  WBAT  Discontinue Hemovac later this AM.   Disposition:  Plan for discharge to home with outpatient therapy when medically stable and pain is controlled, cleared by therapy. Likely later today.              Report completed by:  Jesus Fajardo PA-C  Date: 6/22/2023  Time: 9:01 AM

## 2023-06-25 ASSESSMENT — ACTIVITIES OF DAILY LIVING (ADL)
GIVING WAY, BUCKLING OR SHIFTING OF KNEE: I DO NOT HAVE THE SYMPTOM
GO DOWN STAIRS: ACTIVITY IS VERY DIFFICULT
GO DOWN STAIRS: ACTIVITY IS VERY DIFFICULT
WALK: ACTIVITY IS FAIRLY DIFFICULT
GO UP STAIRS: ACTIVITY IS VERY DIFFICULT
SIT WITH YOUR KNEE BENT: ACTIVITY IS SOMEWHAT DIFFICULT
GIVING WAY, BUCKLING OR SHIFTING OF KNEE: I DO NOT HAVE THE SYMPTOM
PAIN: THE SYMPTOM AFFECTS MY ACTIVITY MODERATELY
SWELLING: I HAVE THE SYMPTOM BUT IT DOES NOT AFFECT MY ACTIVITY
KNEEL ON THE FRONT OF YOUR KNEE: I AM UNABLE TO DO THE ACTIVITY
GO UP STAIRS: ACTIVITY IS VERY DIFFICULT
HOW_WOULD_YOU_RATE_THE_CURRENT_FUNCTION_OF_YOUR_KNEE_DURING_YOUR_USUAL_DAILY_ACTIVITIES_ON_A_SCALE_FROM_0_TO_100_WITH_100_BEING_YOUR_LEVEL_OF_KNEE_FUNCTION_PRIOR_TO_YOUR_INJURY_AND_0_BEING_THE_INABILITY_TO_PERFORM_ANY_OF_YOUR_USUAL_DAILY_ACTIVITIES?: 10
KNEEL ON THE FRONT OF YOUR KNEE: I AM UNABLE TO DO THE ACTIVITY
SWELLING: I HAVE THE SYMPTOM BUT IT DOES NOT AFFECT MY ACTIVITY
SQUAT: I AM UNABLE TO DO THE ACTIVITY
SQUAT: I AM UNABLE TO DO THE ACTIVITY
STAND: ACTIVITY IS MINIMALLY DIFFICULT
STIFFNESS: THE SYMPTOM AFFECTS MY ACTIVITY SLIGHTLY
RAW_SCORE: 34
WEAKNESS: I DO NOT HAVE THE SYMPTOM
AS_A_RESULT_OF_YOUR_KNEE_INJURY,_HOW_WOULD_YOU_RATE_YOUR_CURRENT_LEVEL_OF_DAILY_ACTIVITY?: SEVERELY ABNORMAL
RISE FROM A CHAIR: ACTIVITY IS FAIRLY DIFFICULT
HOW_WOULD_YOU_RATE_THE_OVERALL_FUNCTION_OF_YOUR_KNEE_DURING_YOUR_USUAL_DAILY_ACTIVITIES?: SEVERELY ABNORMAL
STIFFNESS: THE SYMPTOM AFFECTS MY ACTIVITY SLIGHTLY
LIMPING: THE SYMPTOM AFFECTS MY ACTIVITY MODERATELY
LIMPING: THE SYMPTOM AFFECTS MY ACTIVITY MODERATELY
KNEE_ACTIVITY_OF_DAILY_LIVING_SUM: 34
HOW_WOULD_YOU_RATE_THE_CURRENT_FUNCTION_OF_YOUR_KNEE_DURING_YOUR_USUAL_DAILY_ACTIVITIES_ON_A_SCALE_FROM_0_TO_100_WITH_100_BEING_YOUR_LEVEL_OF_KNEE_FUNCTION_PRIOR_TO_YOUR_INJURY_AND_0_BEING_THE_INABILITY_TO_PERFORM_ANY_OF_YOUR_USUAL_DAILY_ACTIVITIES?: 10
HOW_WOULD_YOU_RATE_THE_OVERALL_FUNCTION_OF_YOUR_KNEE_DURING_YOUR_USUAL_DAILY_ACTIVITIES?: SEVERELY ABNORMAL
RISE FROM A CHAIR: ACTIVITY IS FAIRLY DIFFICULT
PLEASE_INDICATE_YOR_PRIMARY_REASON_FOR_REFERRAL_TO_THERAPY:: KNEE
STAND: ACTIVITY IS MINIMALLY DIFFICULT
AS_A_RESULT_OF_YOUR_KNEE_INJURY,_HOW_WOULD_YOU_RATE_YOUR_CURRENT_LEVEL_OF_DAILY_ACTIVITY?: SEVERELY ABNORMAL
WALK: ACTIVITY IS FAIRLY DIFFICULT
KNEE_ACTIVITY_OF_DAILY_LIVING_SCORE: 48.57
PAIN: THE SYMPTOM AFFECTS MY ACTIVITY MODERATELY
WEAKNESS: I DO NOT HAVE THE SYMPTOM
SIT WITH YOUR KNEE BENT: ACTIVITY IS SOMEWHAT DIFFICULT

## 2023-06-27 ENCOUNTER — TRANSCRIBE ORDERS (OUTPATIENT)
Dept: OTHER | Age: 73
End: 2023-06-27

## 2023-06-27 ENCOUNTER — THERAPY VISIT (OUTPATIENT)
Dept: PHYSICAL THERAPY | Facility: CLINIC | Age: 73
End: 2023-06-27
Attending: ORTHOPAEDIC SURGERY
Payer: MEDICARE

## 2023-06-27 DIAGNOSIS — Z96.651 S/P TOTAL KNEE ARTHROPLASTY, RIGHT: ICD-10-CM

## 2023-06-27 PROCEDURE — 97161 PT EVAL LOW COMPLEX 20 MIN: CPT | Mod: GP | Performed by: PHYSICAL THERAPIST

## 2023-06-27 PROCEDURE — 97110 THERAPEUTIC EXERCISES: CPT | Mod: GP | Performed by: PHYSICAL THERAPIST

## 2023-06-27 NOTE — PROGRESS NOTES
PHYSICAL THERAPY EVALUATION  Type of Visit: Evaluation    See electronic medical record for Abuse and Falls Screening details.    Subjective      Presenting condition or subjective complaint: s/p R TKA on 6/21/23. Spent one night in hospital and discharged next day. Leaving for [place] on Saturday and will be gone for 1 month. Already has PT appointments set up to continue in [place].   Date of onset: 06/21/23    Relevant medical history:     Dates & types of surgery:      Prior diagnostic imaging/testing results:       Prior therapy history for the same diagnosis, illness or injury:        Prior Level of Function   Transfers: Independent  Ambulation: Independent  ADL: Independent  IADL: independent    Living Environment  Social support:     Type of home:     Stairs to enter the home: Yes 3 Is there a railing: No   Ramp:     Stairs inside the home:         Help at home:    Equipment owned: Walker; Straight Cane     Employment:      Hobbies/Interests:      Patient goals for therapy:      Pain assessment: Pain present     Objective   KNEE EVALUATION  PAIN: Pain Level at Rest: 1/10  Pain Level with Use: 3/10  INTEGUMENTARY (edema, incisions): aquacel dressing in tact. 2 small areas of dried blood on bandage but no signs of no drainage. Skin on medial portion of knee is red but patient reports this is normal for him post surgery. No other signs of infection and pt denies any fever or feeling unwell.   POSTURE:   GAIT:  Weightbearing Status: WBAT  Assistive Device(s): Walker (front wheeled)  Gait Deviations: Antalgic  Knee extension decreased R, Knee flexion decreased R  BALANCE/PROPRIOCEPTION:   WEIGHTBEARING ALIGNMENT:   NON-WEIGHTBEARING ALIGNMENT:   ROM:   (Degrees) Left AROM Left PROM  Right AROM Right PROM   Knee Flexion   100    Knee Extension   15    Pain:   End feel:     STRENGTH: Poor to fair quad set on the right  FLEXIBILITY: Decreased quadriceps R, Decreased hamstrings R, Decreased gastroc R, Decreased  soleus R  SPECIAL TESTS:   FUNCTIONAL TESTS:   PALPATION: incisional tenderness  JOINT MOBILITY: hypomobile R patella    Assessment & Plan 2  CLINICAL IMPRESSIONS   Medical Diagnosis: s/p R TKA 6/21/23    Treatment Diagnosis: right knee pain, decreased ROM, decreased strength, impaired gait, swelling   Impression/Assessment: Patient is a 72 year old male with right knee complaints.  The following significant findings have been identified: Pain, Decreased ROM/flexibility, Decreased joint mobility, Decreased strength, Impaired balance, Decreased proprioception and Impaired gait. These impairments interfere with their ability to perform self care tasks, recreational activities, household chores, driving , household mobility and community mobility as compared to previous level of function.     Clinical Decision Making (Complexity):   Clinical Presentation: Stable/Uncomplicated  Clinical Presentation Rationale: based on medical and personal factors listed in PT evaluation  Clinical Decision Making (Complexity): Low complexity    PLAN OF CARE  Treatment Interventions:  Interventions: Gait Training, Manual Therapy, Neuromuscular Re-education, Therapeutic Activity, Therapeutic Exercise, Self-Care/Home Management    Long Term Goals     PT Goal 1  Goal Identifier: 1  Goal Description: Patient will be able to walk without an AD and without a limp.  Target Date: 08/23/23  PT Goal 2  Goal Identifier: 2  Goal Description: Patient will be able to ascend/descend stairs with reciprocal pattern, 1 railing, and with minimal pain or difficulty.  Date Met: 08/23/23  PT Goal 3  Goal Identifier: 3  Goal Description: Patient will be independent and consistent with HEP 5x a week to aid functional recovery.  Target Date: 08/23/23      Frequency of Treatment: 1-2x a week  Duration of Treatment: 8 weeks    Recommended Referrals to Other Professionals:   Education Assessment:   Learner/Method: Patient;Pictures/Video    Risks and benefits of  evaluation/treatment have been explained.   Patient/Family/caregiver agrees with Plan of Care.     Evaluation Time:     PT Eval, Low Complexity Minutes (20994): 15      Signing Clinician: ANAIS Willingham [facility]                                                                                   OUTPATIENT PHYSICAL THERAPY      PLAN OF TREATMENT FOR OUTPATIENT REHABILITATION   Patient's Last Name, First Name, Jorge Alberto Lara YOB: 1950   Provider's Name   [facility]   Medical Record No.  [id number]     Onset Date: 06/21/23  Start of Care Date: 06/27/23     Medical Diagnosis:  s/p R TKA 6/21/23      PT Treatment Diagnosis:  right knee pain, decreased ROM, decreased strength, impaired gait, swelling Plan of Treatment  Frequency/Duration: 1-2x a week/ 8 weeks    Certification date from 06/28/23 to 08/23/23         See note for plan of treatment details and functional goals     Flower Loredo, PT                         I CERTIFY THE NEED FOR THESE SERVICES FURNISHED UNDER        THIS PLAN OF TREATMENT AND WHILE UNDER MY CARE .             Physician Signature               Date    X_____________________________________________________                        Referring Provider:  Jorge Alberto Jurado      Initial Assessment  See Epic Evaluation- Start of Care Date: 06/27/23

## 2023-06-28 PROBLEM — Z96.651 S/P TOTAL KNEE ARTHROPLASTY, RIGHT: Status: ACTIVE | Noted: 2023-06-28

## 2023-06-29 ENCOUNTER — DOCUMENTATION ONLY (OUTPATIENT)
Dept: OTHER | Facility: CLINIC | Age: 73
End: 2023-06-29
Payer: MEDICARE

## 2023-10-19 ENCOUNTER — APPOINTMENT (OUTPATIENT)
Dept: CT IMAGING | Facility: CLINIC | Age: 73
End: 2023-10-19
Attending: FAMILY MEDICINE
Payer: MEDICARE

## 2023-10-19 ENCOUNTER — HOSPITAL ENCOUNTER (OUTPATIENT)
Facility: CLINIC | Age: 73
Setting detail: OBSERVATION
Discharge: HOME | End: 2023-10-22
Attending: FAMILY MEDICINE | Admitting: STUDENT IN AN ORGANIZED HEALTH CARE EDUCATION/TRAINING PROGRAM
Payer: MEDICARE

## 2023-10-19 ENCOUNTER — APPOINTMENT (OUTPATIENT)
Dept: ULTRASOUND IMAGING | Facility: CLINIC | Age: 73
End: 2023-10-19
Attending: FAMILY MEDICINE
Payer: MEDICARE

## 2023-10-19 DIAGNOSIS — K81.0 ACUTE CHOLECYSTITIS: ICD-10-CM

## 2023-10-19 DIAGNOSIS — Z90.49 S/P CHOLECYSTECTOMY: Primary | ICD-10-CM

## 2023-10-19 DIAGNOSIS — E78.5 HYPERLIPIDEMIA, UNSPECIFIED HYPERLIPIDEMIA TYPE: Chronic | ICD-10-CM

## 2023-10-19 LAB
ALBUMIN SERPL BCG-MCNC: 4.9 G/DL (ref 3.5–5.2)
ALP SERPL-CCNC: 65 U/L (ref 40–129)
ALT SERPL W P-5'-P-CCNC: 39 U/L (ref 0–70)
ANION GAP SERPL CALCULATED.3IONS-SCNC: 17 MMOL/L (ref 7–15)
AST SERPL W P-5'-P-CCNC: 37 U/L (ref 0–45)
BASO+EOS+MONOS # BLD AUTO: ABNORMAL 10*3/UL
BASO+EOS+MONOS NFR BLD AUTO: ABNORMAL %
BASOPHILS # BLD AUTO: 0 10E3/UL (ref 0–0.2)
BASOPHILS NFR BLD AUTO: 0 %
BILIRUB SERPL-MCNC: 0.5 MG/DL
BUN SERPL-MCNC: 16.4 MG/DL (ref 8–23)
CALCIUM SERPL-MCNC: 10.2 MG/DL (ref 8.8–10.2)
CHLORIDE SERPL-SCNC: 95 MMOL/L (ref 98–107)
CREAT SERPL-MCNC: 1.04 MG/DL (ref 0.67–1.17)
DEPRECATED HCO3 PLAS-SCNC: 25 MMOL/L (ref 22–29)
EGFRCR SERPLBLD CKD-EPI 2021: 75 ML/MIN/1.73M2
EOSINOPHIL # BLD AUTO: 0 10E3/UL (ref 0–0.7)
EOSINOPHIL NFR BLD AUTO: 0 %
ERYTHROCYTE [DISTWIDTH] IN BLOOD BY AUTOMATED COUNT: 14.6 % (ref 10–15)
ETHANOL SERPL-MCNC: <0.01 G/DL
GLUCOSE SERPL-MCNC: 117 MG/DL (ref 70–99)
HCT VFR BLD AUTO: 47.8 % (ref 40–53)
HGB BLD-MCNC: 16.2 G/DL (ref 13.3–17.7)
HOLD SPECIMEN: NORMAL
IMM GRANULOCYTES # BLD: 0.1 10E3/UL
IMM GRANULOCYTES NFR BLD: 0 %
LIPASE SERPL-CCNC: 21 U/L (ref 13–60)
LYMPHOCYTES # BLD AUTO: 0.6 10E3/UL (ref 0.8–5.3)
LYMPHOCYTES NFR BLD AUTO: 4 %
MCH RBC QN AUTO: 28.8 PG (ref 26.5–33)
MCHC RBC AUTO-ENTMCNC: 33.9 G/DL (ref 31.5–36.5)
MCV RBC AUTO: 85 FL (ref 78–100)
MONOCYTES # BLD AUTO: 1.6 10E3/UL (ref 0–1.3)
MONOCYTES NFR BLD AUTO: 9 %
NEUTROPHILS # BLD AUTO: 15.1 10E3/UL (ref 1.6–8.3)
NEUTROPHILS NFR BLD AUTO: 87 %
NRBC # BLD AUTO: 0 10E3/UL
NRBC BLD AUTO-RTO: 0 /100
PLATELET # BLD AUTO: 233 10E3/UL (ref 150–450)
POTASSIUM SERPL-SCNC: 4.2 MMOL/L (ref 3.4–5.3)
PROT SERPL-MCNC: 8 G/DL (ref 6.4–8.3)
RBC # BLD AUTO: 5.62 10E6/UL (ref 4.4–5.9)
SODIUM SERPL-SCNC: 137 MMOL/L (ref 135–145)
WBC # BLD AUTO: 17.4 10E3/UL (ref 4–11)

## 2023-10-19 PROCEDURE — G1010 CDSM STANSON: HCPCS

## 2023-10-19 PROCEDURE — 99285 EMERGENCY DEPT VISIT HI MDM: CPT | Mod: 25

## 2023-10-19 PROCEDURE — 80053 COMPREHEN METABOLIC PANEL: CPT | Performed by: FAMILY MEDICINE

## 2023-10-19 PROCEDURE — 250N000009 HC RX 250: Performed by: EMERGENCY MEDICINE

## 2023-10-19 PROCEDURE — 96375 TX/PRO/DX INJ NEW DRUG ADDON: CPT

## 2023-10-19 PROCEDURE — 93005 ELECTROCARDIOGRAM TRACING: CPT

## 2023-10-19 PROCEDURE — 96361 HYDRATE IV INFUSION ADD-ON: CPT

## 2023-10-19 PROCEDURE — 76705 ECHO EXAM OF ABDOMEN: CPT

## 2023-10-19 PROCEDURE — 250N000011 HC RX IP 250 OP 636: Performed by: EMERGENCY MEDICINE

## 2023-10-19 PROCEDURE — 250N000011 HC RX IP 250 OP 636: Mod: JZ | Performed by: FAMILY MEDICINE

## 2023-10-19 PROCEDURE — 258N000003 HC RX IP 258 OP 636: Performed by: FAMILY MEDICINE

## 2023-10-19 PROCEDURE — 83690 ASSAY OF LIPASE: CPT | Performed by: FAMILY MEDICINE

## 2023-10-19 PROCEDURE — 82077 ASSAY SPEC XCP UR&BREATH IA: CPT | Performed by: FAMILY MEDICINE

## 2023-10-19 PROCEDURE — 36415 COLL VENOUS BLD VENIPUNCTURE: CPT | Performed by: FAMILY MEDICINE

## 2023-10-19 PROCEDURE — 85025 COMPLETE CBC W/AUTO DIFF WBC: CPT | Performed by: FAMILY MEDICINE

## 2023-10-19 PROCEDURE — 74177 CT ABD & PELVIS W/CONTRAST: CPT | Mod: MG

## 2023-10-19 RX ORDER — HYDROMORPHONE HYDROCHLORIDE 1 MG/ML
0.5 INJECTION, SOLUTION INTRAMUSCULAR; INTRAVENOUS; SUBCUTANEOUS
Status: COMPLETED | OUTPATIENT
Start: 2023-10-19 | End: 2023-10-20

## 2023-10-19 RX ORDER — KETOROLAC TROMETHAMINE 15 MG/ML
15 INJECTION, SOLUTION INTRAMUSCULAR; INTRAVENOUS ONCE
Status: COMPLETED | OUTPATIENT
Start: 2023-10-19 | End: 2023-10-19

## 2023-10-19 RX ORDER — IOPAMIDOL 755 MG/ML
100 INJECTION, SOLUTION INTRAVASCULAR ONCE
Status: COMPLETED | OUTPATIENT
Start: 2023-10-19 | End: 2023-10-19

## 2023-10-19 RX ORDER — ONDANSETRON 2 MG/ML
4 INJECTION INTRAMUSCULAR; INTRAVENOUS ONCE
Status: COMPLETED | OUTPATIENT
Start: 2023-10-19 | End: 2023-10-19

## 2023-10-19 RX ADMIN — KETOROLAC TROMETHAMINE 15 MG: 15 INJECTION, SOLUTION INTRAMUSCULAR; INTRAVENOUS at 19:43

## 2023-10-19 RX ADMIN — SODIUM CHLORIDE 1000 ML: 9 INJECTION, SOLUTION INTRAVENOUS at 18:33

## 2023-10-19 RX ADMIN — ONDANSETRON 4 MG: 2 INJECTION INTRAMUSCULAR; INTRAVENOUS at 18:34

## 2023-10-19 RX ADMIN — IOPAMIDOL 100 ML: 755 INJECTION, SOLUTION INTRAVENOUS at 23:09

## 2023-10-19 RX ADMIN — HYDROMORPHONE HYDROCHLORIDE 0.5 MG: 1 INJECTION, SOLUTION INTRAMUSCULAR; INTRAVENOUS; SUBCUTANEOUS at 19:44

## 2023-10-19 RX ADMIN — SODIUM CHLORIDE 74 ML: 9 INJECTION, SOLUTION INTRAVENOUS at 23:09

## 2023-10-19 ASSESSMENT — ACTIVITIES OF DAILY LIVING (ADL)
ADLS_ACUITY_SCORE: 35

## 2023-10-19 NOTE — ED TRIAGE NOTES
Patient states upper abdominal pain began at 1030 today.  Nausea with dry heaves.  Pt is pale, sclera red.  Reports extreme fatigue.  Denies diarrhea and constipation.  Hx of pancreatitis.     Triage Assessment (Adult)       Row Name 10/19/23 8259          Triage Assessment    Airway WDL WDL        Respiratory WDL    Respiratory WDL X;rhythm/pattern     Rhythm/Pattern, Respiratory shortness of breath        Skin Circulation/Temperature WDL    Skin Circulation/Temperature WDL X  pale        Cardiac WDL    Cardiac WDL WDL        Peripheral/Neurovascular WDL    Peripheral Neurovascular WDL WDL        Cognitive/Neuro/Behavioral WDL    Cognitive/Neuro/Behavioral WDL X

## 2023-10-20 ENCOUNTER — TELEPHONE (OUTPATIENT)
Dept: SURGERY | Facility: CLINIC | Age: 73
End: 2023-10-20
Payer: MEDICARE

## 2023-10-20 ENCOUNTER — ANESTHESIA EVENT (OUTPATIENT)
Dept: SURGERY | Facility: CLINIC | Age: 73
End: 2023-10-20
Payer: MEDICARE

## 2023-10-20 ENCOUNTER — ANESTHESIA (OUTPATIENT)
Dept: SURGERY | Facility: CLINIC | Age: 73
End: 2023-10-20
Payer: MEDICARE

## 2023-10-20 ENCOUNTER — HOSPITAL ENCOUNTER (OUTPATIENT)
Facility: CLINIC | Age: 73
End: 2023-10-20
Attending: STUDENT IN AN ORGANIZED HEALTH CARE EDUCATION/TRAINING PROGRAM | Admitting: STUDENT IN AN ORGANIZED HEALTH CARE EDUCATION/TRAINING PROGRAM
Payer: MEDICARE

## 2023-10-20 PROBLEM — K81.0 ACUTE CHOLECYSTITIS: Status: ACTIVE | Noted: 2023-10-20

## 2023-10-20 LAB
ALBUMIN SERPL BCG-MCNC: 3.9 G/DL (ref 3.5–5.2)
ALP SERPL-CCNC: 55 U/L (ref 40–129)
ALT SERPL W P-5'-P-CCNC: 43 U/L (ref 0–70)
ANION GAP SERPL CALCULATED.3IONS-SCNC: 14 MMOL/L (ref 7–15)
AST SERPL W P-5'-P-CCNC: 45 U/L (ref 0–45)
BASO+EOS+MONOS # BLD AUTO: ABNORMAL 10*3/UL
BASO+EOS+MONOS NFR BLD AUTO: ABNORMAL %
BASOPHILS # BLD AUTO: 0 10E3/UL (ref 0–0.2)
BASOPHILS NFR BLD AUTO: 0 %
BILIRUB SERPL-MCNC: 0.5 MG/DL
BUN SERPL-MCNC: 18.7 MG/DL (ref 8–23)
CALCIUM SERPL-MCNC: 8.8 MG/DL (ref 8.8–10.2)
CHLORIDE SERPL-SCNC: 96 MMOL/L (ref 98–107)
CREAT SERPL-MCNC: 1.15 MG/DL (ref 0.67–1.17)
DEPRECATED HCO3 PLAS-SCNC: 25 MMOL/L (ref 22–29)
EGFRCR SERPLBLD CKD-EPI 2021: 66 ML/MIN/1.73M2
EOSINOPHIL # BLD AUTO: 0 10E3/UL (ref 0–0.7)
EOSINOPHIL NFR BLD AUTO: 0 %
ERYTHROCYTE [DISTWIDTH] IN BLOOD BY AUTOMATED COUNT: 14.9 % (ref 10–15)
GLUCOSE SERPL-MCNC: 143 MG/DL (ref 70–99)
HCT VFR BLD AUTO: 41.4 % (ref 40–53)
HGB BLD-MCNC: 13.9 G/DL (ref 13.3–17.7)
IMM GRANULOCYTES # BLD: 0.1 10E3/UL
IMM GRANULOCYTES NFR BLD: 1 %
LIPASE SERPL-CCNC: 21 U/L (ref 13–60)
LYMPHOCYTES # BLD AUTO: 0.6 10E3/UL (ref 0.8–5.3)
LYMPHOCYTES NFR BLD AUTO: 4 %
MCH RBC QN AUTO: 28.7 PG (ref 26.5–33)
MCHC RBC AUTO-ENTMCNC: 33.6 G/DL (ref 31.5–36.5)
MCV RBC AUTO: 86 FL (ref 78–100)
MONOCYTES # BLD AUTO: 1.7 10E3/UL (ref 0–1.3)
MONOCYTES NFR BLD AUTO: 10 %
NEUTROPHILS # BLD AUTO: 14.2 10E3/UL (ref 1.6–8.3)
NEUTROPHILS NFR BLD AUTO: 85 %
NRBC # BLD AUTO: 0 10E3/UL
NRBC BLD AUTO-RTO: 0 /100
PLATELET # BLD AUTO: 180 10E3/UL (ref 150–450)
POTASSIUM SERPL-SCNC: 3.8 MMOL/L (ref 3.4–5.3)
PROT SERPL-MCNC: 6.2 G/DL (ref 6.4–8.3)
RBC # BLD AUTO: 4.84 10E6/UL (ref 4.4–5.9)
SODIUM SERPL-SCNC: 135 MMOL/L (ref 135–145)
WBC # BLD AUTO: 16.5 10E3/UL (ref 4–11)

## 2023-10-20 PROCEDURE — 250N000011 HC RX IP 250 OP 636: Performed by: EMERGENCY MEDICINE

## 2023-10-20 PROCEDURE — 96367 TX/PROPH/DG ADDL SEQ IV INF: CPT | Mod: XU

## 2023-10-20 PROCEDURE — 271N000001 HC OR GENERAL SUPPLY NON-STERILE: Performed by: STUDENT IN AN ORGANIZED HEALTH CARE EDUCATION/TRAINING PROGRAM

## 2023-10-20 PROCEDURE — 99222 1ST HOSP IP/OBS MODERATE 55: CPT | Mod: 57 | Performed by: STUDENT IN AN ORGANIZED HEALTH CARE EDUCATION/TRAINING PROGRAM

## 2023-10-20 PROCEDURE — 88304 TISSUE EXAM BY PATHOLOGIST: CPT | Mod: TC | Performed by: STUDENT IN AN ORGANIZED HEALTH CARE EDUCATION/TRAINING PROGRAM

## 2023-10-20 PROCEDURE — 250N000009 HC RX 250: Performed by: NURSE ANESTHETIST, CERTIFIED REGISTERED

## 2023-10-20 PROCEDURE — 250N000011 HC RX IP 250 OP 636: Performed by: FAMILY MEDICINE

## 2023-10-20 PROCEDURE — 96366 THER/PROPH/DIAG IV INF ADDON: CPT

## 2023-10-20 PROCEDURE — 88304 TISSUE EXAM BY PATHOLOGIST: CPT | Mod: 26 | Performed by: PATHOLOGY

## 2023-10-20 PROCEDURE — 250N000013 HC RX MED GY IP 250 OP 250 PS 637: Performed by: EMERGENCY MEDICINE

## 2023-10-20 PROCEDURE — 80053 COMPREHEN METABOLIC PANEL: CPT | Performed by: EMERGENCY MEDICINE

## 2023-10-20 PROCEDURE — 710N000010 HC RECOVERY PHASE 1, LEVEL 2, PER MIN: Performed by: STUDENT IN AN ORGANIZED HEALTH CARE EDUCATION/TRAINING PROGRAM

## 2023-10-20 PROCEDURE — 250N000011 HC RX IP 250 OP 636: Performed by: NURSE ANESTHETIST, CERTIFIED REGISTERED

## 2023-10-20 PROCEDURE — 47562 LAPAROSCOPIC CHOLECYSTECTOMY: CPT | Performed by: STUDENT IN AN ORGANIZED HEALTH CARE EDUCATION/TRAINING PROGRAM

## 2023-10-20 PROCEDURE — 272N000001 HC OR GENERAL SUPPLY STERILE: Performed by: STUDENT IN AN ORGANIZED HEALTH CARE EDUCATION/TRAINING PROGRAM

## 2023-10-20 PROCEDURE — 258N000003 HC RX IP 258 OP 636: Performed by: NURSE ANESTHETIST, CERTIFIED REGISTERED

## 2023-10-20 PROCEDURE — 36415 COLL VENOUS BLD VENIPUNCTURE: CPT | Performed by: EMERGENCY MEDICINE

## 2023-10-20 PROCEDURE — G0378 HOSPITAL OBSERVATION PER HR: HCPCS

## 2023-10-20 PROCEDURE — 96376 TX/PRO/DX INJ SAME DRUG ADON: CPT | Mod: XU

## 2023-10-20 PROCEDURE — 370N000017 HC ANESTHESIA TECHNICAL FEE, PER MIN: Performed by: STUDENT IN AN ORGANIZED HEALTH CARE EDUCATION/TRAINING PROGRAM

## 2023-10-20 PROCEDURE — 999N000141 HC STATISTIC PRE-PROCEDURE NURSING ASSESSMENT: Performed by: STUDENT IN AN ORGANIZED HEALTH CARE EDUCATION/TRAINING PROGRAM

## 2023-10-20 PROCEDURE — 250N000009 HC RX 250: Performed by: STUDENT IN AN ORGANIZED HEALTH CARE EDUCATION/TRAINING PROGRAM

## 2023-10-20 PROCEDURE — 250N000013 HC RX MED GY IP 250 OP 250 PS 637: Performed by: STUDENT IN AN ORGANIZED HEALTH CARE EDUCATION/TRAINING PROGRAM

## 2023-10-20 PROCEDURE — 258N000001 HC RX 258: Performed by: STUDENT IN AN ORGANIZED HEALTH CARE EDUCATION/TRAINING PROGRAM

## 2023-10-20 PROCEDURE — 250N000025 HC SEVOFLURANE, PER MIN: Performed by: STUDENT IN AN ORGANIZED HEALTH CARE EDUCATION/TRAINING PROGRAM

## 2023-10-20 PROCEDURE — 258N000003 HC RX IP 258 OP 636: Performed by: STUDENT IN AN ORGANIZED HEALTH CARE EDUCATION/TRAINING PROGRAM

## 2023-10-20 PROCEDURE — 360N000080 HC SURGERY LEVEL 7, PER MIN: Performed by: STUDENT IN AN ORGANIZED HEALTH CARE EDUCATION/TRAINING PROGRAM

## 2023-10-20 PROCEDURE — 83690 ASSAY OF LIPASE: CPT | Performed by: EMERGENCY MEDICINE

## 2023-10-20 PROCEDURE — 96365 THER/PROPH/DIAG IV INF INIT: CPT

## 2023-10-20 PROCEDURE — 250N000013 HC RX MED GY IP 250 OP 250 PS 637: Performed by: NURSE ANESTHETIST, CERTIFIED REGISTERED

## 2023-10-20 PROCEDURE — 250N000011 HC RX IP 250 OP 636: Mod: JZ | Performed by: STUDENT IN AN ORGANIZED HEALTH CARE EDUCATION/TRAINING PROGRAM

## 2023-10-20 PROCEDURE — 85025 COMPLETE CBC W/AUTO DIFF WBC: CPT | Performed by: EMERGENCY MEDICINE

## 2023-10-20 RX ORDER — AMLODIPINE BESYLATE 10 MG/1
10 TABLET ORAL DAILY
Status: DISCONTINUED | OUTPATIENT
Start: 2023-10-20 | End: 2023-10-22 | Stop reason: HOSPADM

## 2023-10-20 RX ORDER — LISINOPRIL AND HYDROCHLOROTHIAZIDE 20; 25 MG/1; MG/1
1 TABLET ORAL 2 TIMES DAILY
Status: DISCONTINUED | OUTPATIENT
Start: 2023-10-20 | End: 2023-10-22 | Stop reason: HOSPADM

## 2023-10-20 RX ORDER — ROSUVASTATIN CALCIUM 5 MG/1
10 TABLET, COATED ORAL DAILY
Status: DISCONTINUED | OUTPATIENT
Start: 2023-10-20 | End: 2023-10-22 | Stop reason: HOSPADM

## 2023-10-20 RX ORDER — FENTANYL CITRATE 50 UG/ML
25 INJECTION, SOLUTION INTRAMUSCULAR; INTRAVENOUS EVERY 5 MIN PRN
Status: DISCONTINUED | OUTPATIENT
Start: 2023-10-20 | End: 2023-10-20 | Stop reason: HOSPADM

## 2023-10-20 RX ORDER — TADALAFIL 5 MG/1
5 TABLET ORAL DAILY PRN
Status: DISCONTINUED | OUTPATIENT
Start: 2023-10-20 | End: 2023-10-20

## 2023-10-20 RX ORDER — OXYCODONE HYDROCHLORIDE 5 MG/1
5 TABLET ORAL
Status: CANCELLED | OUTPATIENT
Start: 2023-10-20

## 2023-10-20 RX ORDER — ONDANSETRON 2 MG/ML
INJECTION INTRAMUSCULAR; INTRAVENOUS PRN
Status: DISCONTINUED | OUTPATIENT
Start: 2023-10-20 | End: 2023-10-20

## 2023-10-20 RX ORDER — ONDANSETRON 2 MG/ML
4 INJECTION INTRAMUSCULAR; INTRAVENOUS EVERY 30 MIN PRN
Status: CANCELLED | OUTPATIENT
Start: 2023-10-20

## 2023-10-20 RX ORDER — METRONIDAZOLE 500 MG/100ML
500 INJECTION, SOLUTION INTRAVENOUS EVERY 12 HOURS
Status: DISCONTINUED | OUTPATIENT
Start: 2023-10-20 | End: 2023-10-22 | Stop reason: HOSPADM

## 2023-10-20 RX ORDER — PROPOFOL 10 MG/ML
INJECTION, EMULSION INTRAVENOUS PRN
Status: DISCONTINUED | OUTPATIENT
Start: 2023-10-20 | End: 2023-10-20

## 2023-10-20 RX ORDER — FUROSEMIDE 20 MG/1
10 TABLET ORAL DAILY
Status: DISCONTINUED | OUTPATIENT
Start: 2023-10-20 | End: 2023-10-22 | Stop reason: HOSPADM

## 2023-10-20 RX ORDER — GLYCOPYRROLATE 0.2 MG/ML
INJECTION, SOLUTION INTRAMUSCULAR; INTRAVENOUS PRN
Status: DISCONTINUED | OUTPATIENT
Start: 2023-10-20 | End: 2023-10-20

## 2023-10-20 RX ORDER — ONDANSETRON 4 MG/1
4 TABLET, ORALLY DISINTEGRATING ORAL EVERY 30 MIN PRN
Status: CANCELLED | OUTPATIENT
Start: 2023-10-20

## 2023-10-20 RX ORDER — MAGNESIUM HYDROXIDE 1200 MG/15ML
LIQUID ORAL PRN
Status: DISCONTINUED | OUTPATIENT
Start: 2023-10-20 | End: 2023-10-22 | Stop reason: HOSPADM

## 2023-10-20 RX ORDER — NALOXONE HYDROCHLORIDE 0.4 MG/ML
0.2 INJECTION, SOLUTION INTRAMUSCULAR; INTRAVENOUS; SUBCUTANEOUS
Status: DISCONTINUED | OUTPATIENT
Start: 2023-10-20 | End: 2023-10-22 | Stop reason: HOSPADM

## 2023-10-20 RX ORDER — HYDROXYZINE HYDROCHLORIDE 25 MG/1
25 TABLET, FILM COATED ORAL EVERY 6 HOURS PRN
Status: CANCELLED | OUTPATIENT
Start: 2023-10-20

## 2023-10-20 RX ORDER — FLUTICASONE FUROATE AND VILANTEROL 100; 25 UG/1; UG/1
1 POWDER RESPIRATORY (INHALATION) DAILY
Status: DISCONTINUED | OUTPATIENT
Start: 2023-10-20 | End: 2023-10-20

## 2023-10-20 RX ORDER — AMOXICILLIN 250 MG
1-2 CAPSULE ORAL 2 TIMES DAILY
Qty: 30 TABLET | Refills: 0 | Status: SHIPPED | OUTPATIENT
Start: 2023-10-20

## 2023-10-20 RX ORDER — HYDROMORPHONE HCL IN WATER/PF 6 MG/30 ML
0.4 PATIENT CONTROLLED ANALGESIA SYRINGE INTRAVENOUS EVERY 5 MIN PRN
Status: DISCONTINUED | OUTPATIENT
Start: 2023-10-20 | End: 2023-10-20 | Stop reason: HOSPADM

## 2023-10-20 RX ORDER — ONDANSETRON 4 MG/1
4 TABLET, ORALLY DISINTEGRATING ORAL EVERY 30 MIN PRN
Status: DISCONTINUED | OUTPATIENT
Start: 2023-10-20 | End: 2023-10-20 | Stop reason: HOSPADM

## 2023-10-20 RX ORDER — SODIUM CHLORIDE, SODIUM LACTATE, POTASSIUM CHLORIDE, CALCIUM CHLORIDE 600; 310; 30; 20 MG/100ML; MG/100ML; MG/100ML; MG/100ML
INJECTION, SOLUTION INTRAVENOUS CONTINUOUS
Status: ACTIVE | OUTPATIENT
Start: 2023-10-20 | End: 2023-10-21

## 2023-10-20 RX ORDER — METHOCARBAMOL 500 MG/1
500 TABLET, FILM COATED ORAL 4 TIMES DAILY PRN
Status: DISCONTINUED | OUTPATIENT
Start: 2023-10-20 | End: 2023-10-22 | Stop reason: HOSPADM

## 2023-10-20 RX ORDER — SENNOSIDES 8.6 MG
8.6 TABLET ORAL 2 TIMES DAILY
Status: DISCONTINUED | OUTPATIENT
Start: 2023-10-20 | End: 2023-10-22 | Stop reason: HOSPADM

## 2023-10-20 RX ORDER — IBUPROFEN 600 MG/1
600 TABLET, FILM COATED ORAL EVERY 6 HOURS PRN
Status: DISCONTINUED | OUTPATIENT
Start: 2023-10-20 | End: 2023-10-21

## 2023-10-20 RX ORDER — METOPROLOL SUCCINATE 100 MG/1
100 TABLET, EXTENDED RELEASE ORAL DAILY
Status: DISCONTINUED | OUTPATIENT
Start: 2023-10-20 | End: 2023-10-22 | Stop reason: HOSPADM

## 2023-10-20 RX ORDER — ALBUTEROL SULFATE 90 UG/1
1-2 AEROSOL, METERED RESPIRATORY (INHALATION) EVERY 4 HOURS PRN
Status: DISCONTINUED | OUTPATIENT
Start: 2023-10-20 | End: 2023-10-22 | Stop reason: HOSPADM

## 2023-10-20 RX ORDER — NALOXONE HYDROCHLORIDE 0.4 MG/ML
0.4 INJECTION, SOLUTION INTRAMUSCULAR; INTRAVENOUS; SUBCUTANEOUS
Status: DISCONTINUED | OUTPATIENT
Start: 2023-10-20 | End: 2023-10-22 | Stop reason: HOSPADM

## 2023-10-20 RX ORDER — ALBUTEROL SULFATE 0.83 MG/ML
2.5 SOLUTION RESPIRATORY (INHALATION) EVERY 4 HOURS PRN
Status: DISCONTINUED | OUTPATIENT
Start: 2023-10-20 | End: 2023-10-22 | Stop reason: HOSPADM

## 2023-10-20 RX ORDER — CEFAZOLIN SODIUM 1 G/3ML
INJECTION, POWDER, FOR SOLUTION INTRAMUSCULAR; INTRAVENOUS PRN
Status: DISCONTINUED | OUTPATIENT
Start: 2023-10-20 | End: 2023-10-20

## 2023-10-20 RX ORDER — HEPARIN SODIUM 5000 [USP'U]/.5ML
5000 INJECTION, SOLUTION INTRAVENOUS; SUBCUTANEOUS
Status: COMPLETED | OUTPATIENT
Start: 2023-10-20 | End: 2023-10-20

## 2023-10-20 RX ORDER — LIDOCAINE 40 MG/G
CREAM TOPICAL
Status: DISCONTINUED | OUTPATIENT
Start: 2023-10-20 | End: 2023-10-22 | Stop reason: HOSPADM

## 2023-10-20 RX ORDER — ONDANSETRON 2 MG/ML
4 INJECTION INTRAMUSCULAR; INTRAVENOUS EVERY 30 MIN PRN
Status: DISCONTINUED | OUTPATIENT
Start: 2023-10-20 | End: 2023-10-20 | Stop reason: HOSPADM

## 2023-10-20 RX ORDER — ASPIRIN 81 MG/1
81 TABLET ORAL DAILY
Status: ON HOLD | COMMUNITY
End: 2023-10-22

## 2023-10-20 RX ORDER — GABAPENTIN 100 MG/1
100 CAPSULE ORAL
Status: COMPLETED | OUTPATIENT
Start: 2023-10-20 | End: 2023-10-20

## 2023-10-20 RX ORDER — HYDROMORPHONE HCL IN WATER/PF 6 MG/30 ML
0.2 PATIENT CONTROLLED ANALGESIA SYRINGE INTRAVENOUS EVERY 5 MIN PRN
Status: DISCONTINUED | OUTPATIENT
Start: 2023-10-20 | End: 2023-10-20 | Stop reason: HOSPADM

## 2023-10-20 RX ORDER — INDOCYANINE GREEN AND WATER 25 MG
2.5 KIT INJECTION ONCE
Status: COMPLETED | OUTPATIENT
Start: 2023-10-20 | End: 2023-10-20

## 2023-10-20 RX ORDER — CIPROFLOXACIN 2 MG/ML
400 INJECTION, SOLUTION INTRAVENOUS EVERY 12 HOURS
Status: DISCONTINUED | OUTPATIENT
Start: 2023-10-20 | End: 2023-10-22 | Stop reason: HOSPADM

## 2023-10-20 RX ORDER — KETOROLAC TROMETHAMINE 30 MG/ML
INJECTION, SOLUTION INTRAMUSCULAR; INTRAVENOUS PRN
Status: DISCONTINUED | OUTPATIENT
Start: 2023-10-20 | End: 2023-10-20

## 2023-10-20 RX ORDER — ONDANSETRON 2 MG/ML
4 INJECTION INTRAMUSCULAR; INTRAVENOUS EVERY 30 MIN PRN
Status: COMPLETED | OUTPATIENT
Start: 2023-10-20 | End: 2023-10-20

## 2023-10-20 RX ORDER — ACETAMINOPHEN 325 MG/1
975 TABLET ORAL ONCE
Status: DISCONTINUED | OUTPATIENT
Start: 2023-10-20 | End: 2023-10-20

## 2023-10-20 RX ORDER — FENTANYL CITRATE 50 UG/ML
INJECTION, SOLUTION INTRAMUSCULAR; INTRAVENOUS PRN
Status: DISCONTINUED | OUTPATIENT
Start: 2023-10-20 | End: 2023-10-20

## 2023-10-20 RX ORDER — BUPIVACAINE HYDROCHLORIDE AND EPINEPHRINE 5; 5 MG/ML; UG/ML
INJECTION, SOLUTION PERINEURAL PRN
Status: DISCONTINUED | OUTPATIENT
Start: 2023-10-20 | End: 2023-10-20 | Stop reason: HOSPADM

## 2023-10-20 RX ORDER — OXYCODONE HYDROCHLORIDE 5 MG/1
10 TABLET ORAL EVERY 4 HOURS PRN
Status: DISCONTINUED | OUTPATIENT
Start: 2023-10-20 | End: 2023-10-22 | Stop reason: HOSPADM

## 2023-10-20 RX ORDER — ALBUTEROL SULFATE 0.83 MG/ML
2.5 SOLUTION RESPIRATORY (INHALATION) EVERY 6 HOURS PRN
Status: DISCONTINUED | OUTPATIENT
Start: 2023-10-20 | End: 2023-10-20 | Stop reason: HOSPADM

## 2023-10-20 RX ORDER — ONDANSETRON 2 MG/ML
4 INJECTION INTRAMUSCULAR; INTRAVENOUS EVERY 6 HOURS PRN
Status: DISCONTINUED | OUTPATIENT
Start: 2023-10-20 | End: 2023-10-22 | Stop reason: HOSPADM

## 2023-10-20 RX ORDER — DEXTROSE, SODIUM CHLORIDE, SODIUM LACTATE, POTASSIUM CHLORIDE, AND CALCIUM CHLORIDE 5; .6; .31; .03; .02 G/100ML; G/100ML; G/100ML; G/100ML; G/100ML
INJECTION, SOLUTION INTRAVENOUS CONTINUOUS
Status: DISCONTINUED | OUTPATIENT
Start: 2023-10-20 | End: 2023-10-21

## 2023-10-20 RX ORDER — ONDANSETRON 4 MG/1
4 TABLET, ORALLY DISINTEGRATING ORAL EVERY 6 HOURS PRN
Status: DISCONTINUED | OUTPATIENT
Start: 2023-10-20 | End: 2023-10-22 | Stop reason: HOSPADM

## 2023-10-20 RX ORDER — HYDROXYZINE HYDROCHLORIDE 50 MG/1
50 TABLET, FILM COATED ORAL EVERY 6 HOURS PRN
Status: CANCELLED | OUTPATIENT
Start: 2023-10-20

## 2023-10-20 RX ORDER — LIDOCAINE HYDROCHLORIDE 20 MG/ML
INJECTION, SOLUTION INFILTRATION; PERINEURAL PRN
Status: DISCONTINUED | OUTPATIENT
Start: 2023-10-20 | End: 2023-10-20

## 2023-10-20 RX ORDER — METRONIDAZOLE 500 MG/100ML
500 INJECTION, SOLUTION INTRAVENOUS EVERY 8 HOURS
Status: DISCONTINUED | OUTPATIENT
Start: 2023-10-20 | End: 2023-10-20

## 2023-10-20 RX ORDER — FLUTICASONE PROPIONATE AND SALMETEROL 232; 14 UG/1; UG/1
1 POWDER, METERED RESPIRATORY (INHALATION) 2 TIMES DAILY
COMMUNITY

## 2023-10-20 RX ORDER — DEXAMETHASONE SODIUM PHOSPHATE 4 MG/ML
INJECTION, SOLUTION INTRA-ARTICULAR; INTRALESIONAL; INTRAMUSCULAR; INTRAVENOUS; SOFT TISSUE PRN
Status: DISCONTINUED | OUTPATIENT
Start: 2023-10-20 | End: 2023-10-20

## 2023-10-20 RX ORDER — PANTOPRAZOLE SODIUM 40 MG/1
40 TABLET, DELAYED RELEASE ORAL DAILY
Status: DISCONTINUED | OUTPATIENT
Start: 2023-10-20 | End: 2023-10-22 | Stop reason: HOSPADM

## 2023-10-20 RX ORDER — HYDROMORPHONE HCL IN WATER/PF 6 MG/30 ML
0.4 PATIENT CONTROLLED ANALGESIA SYRINGE INTRAVENOUS
Status: ACTIVE | OUTPATIENT
Start: 2023-10-20 | End: 2023-10-20

## 2023-10-20 RX ORDER — ACETAMINOPHEN 325 MG/1
975 TABLET ORAL ONCE
Status: COMPLETED | OUTPATIENT
Start: 2023-10-20 | End: 2023-10-20

## 2023-10-20 RX ORDER — ACETAMINOPHEN 325 MG/1
650 TABLET ORAL EVERY 4 HOURS PRN
Qty: 30 TABLET | Refills: 0 | Status: SHIPPED | OUTPATIENT
Start: 2023-10-20 | End: 2023-10-22

## 2023-10-20 RX ORDER — SODIUM CHLORIDE, SODIUM LACTATE, POTASSIUM CHLORIDE, CALCIUM CHLORIDE 600; 310; 30; 20 MG/100ML; MG/100ML; MG/100ML; MG/100ML
INJECTION, SOLUTION INTRAVENOUS CONTINUOUS
Status: DISCONTINUED | OUTPATIENT
Start: 2023-10-20 | End: 2023-10-20 | Stop reason: HOSPADM

## 2023-10-20 RX ORDER — ENOXAPARIN SODIUM 100 MG/ML
40 INJECTION SUBCUTANEOUS EVERY 24 HOURS
Status: DISCONTINUED | OUTPATIENT
Start: 2023-10-21 | End: 2023-10-22 | Stop reason: HOSPADM

## 2023-10-20 RX ORDER — ACETAMINOPHEN 325 MG/1
650 TABLET ORAL EVERY 6 HOURS
Status: DISCONTINUED | OUTPATIENT
Start: 2023-10-20 | End: 2023-10-22

## 2023-10-20 RX ORDER — FENTANYL CITRATE 50 UG/ML
50 INJECTION, SOLUTION INTRAMUSCULAR; INTRAVENOUS EVERY 5 MIN PRN
Status: DISCONTINUED | OUTPATIENT
Start: 2023-10-20 | End: 2023-10-20 | Stop reason: HOSPADM

## 2023-10-20 RX ORDER — CIPROFLOXACIN 500 MG/1
500 TABLET, FILM COATED ORAL 2 TIMES DAILY
Qty: 8 TABLET | Refills: 0 | Status: SHIPPED | OUTPATIENT
Start: 2023-10-20 | End: 2023-10-24

## 2023-10-20 RX ORDER — OXYCODONE HYDROCHLORIDE 5 MG/1
5 TABLET ORAL EVERY 4 HOURS PRN
Status: DISCONTINUED | OUTPATIENT
Start: 2023-10-20 | End: 2023-10-22 | Stop reason: HOSPADM

## 2023-10-20 RX ORDER — LIDOCAINE 40 MG/G
CREAM TOPICAL
Status: DISCONTINUED | OUTPATIENT
Start: 2023-10-20 | End: 2023-10-20 | Stop reason: HOSPADM

## 2023-10-20 RX ORDER — FENTANYL CITRATE 50 UG/ML
25 INJECTION, SOLUTION INTRAMUSCULAR; INTRAVENOUS
Status: CANCELLED | OUTPATIENT
Start: 2023-10-20

## 2023-10-20 RX ORDER — METRONIDAZOLE 500 MG/1
500 TABLET ORAL 2 TIMES DAILY
Qty: 8 TABLET | Refills: 0 | Status: SHIPPED | OUTPATIENT
Start: 2023-10-20 | End: 2023-10-24

## 2023-10-20 RX ORDER — HYDROMORPHONE HCL IN WATER/PF 6 MG/30 ML
0.2 PATIENT CONTROLLED ANALGESIA SYRINGE INTRAVENOUS
Status: ACTIVE | OUTPATIENT
Start: 2023-10-20 | End: 2023-10-20

## 2023-10-20 RX ORDER — MAGNESIUM SULFATE HEPTAHYDRATE 40 MG/ML
2 INJECTION, SOLUTION INTRAVENOUS ONCE
Status: COMPLETED | OUTPATIENT
Start: 2023-10-20 | End: 2023-10-20

## 2023-10-20 RX ORDER — IBUPROFEN 200 MG
200 TABLET ORAL EVERY 6 HOURS PRN
Qty: 30 TABLET | Refills: 0 | Status: SHIPPED | OUTPATIENT
Start: 2023-10-20

## 2023-10-20 RX ORDER — FLUTICASONE PROPIONATE AND SALMETEROL 232; 14 UG/1; UG/1
1 POWDER, METERED RESPIRATORY (INHALATION) 2 TIMES DAILY
Status: DISCONTINUED | OUTPATIENT
Start: 2023-10-20 | End: 2023-10-22 | Stop reason: HOSPADM

## 2023-10-20 RX ORDER — OXYCODONE HYDROCHLORIDE 5 MG/1
5-10 TABLET ORAL
Qty: 30 TABLET | Refills: 0 | Status: SHIPPED | OUTPATIENT
Start: 2023-10-20

## 2023-10-20 RX ORDER — METOPROLOL TARTRATE 25 MG/1
25 TABLET, FILM COATED ORAL 2 TIMES DAILY
Status: DISCONTINUED | OUTPATIENT
Start: 2023-10-20 | End: 2023-10-20

## 2023-10-20 RX ADMIN — ROCURONIUM BROMIDE 20 MG: 50 INJECTION, SOLUTION INTRAVENOUS at 11:39

## 2023-10-20 RX ADMIN — PANTOPRAZOLE SODIUM 40 MG: 40 TABLET, DELAYED RELEASE ORAL at 17:08

## 2023-10-20 RX ADMIN — ACETAMINOPHEN 975 MG: 325 TABLET, FILM COATED ORAL at 09:34

## 2023-10-20 RX ADMIN — METRONIDAZOLE 500 MG: 500 INJECTION, SOLUTION INTRAVENOUS at 01:53

## 2023-10-20 RX ADMIN — SUGAMMADEX 200 MG: 100 INJECTION, SOLUTION INTRAVENOUS at 13:16

## 2023-10-20 RX ADMIN — ROCURONIUM BROMIDE 20 MG: 50 INJECTION, SOLUTION INTRAVENOUS at 11:13

## 2023-10-20 RX ADMIN — PHENYLEPHRINE HYDROCHLORIDE 200 MCG: 10 INJECTION INTRAVENOUS at 11:04

## 2023-10-20 RX ADMIN — PHENYLEPHRINE HYDROCHLORIDE 200 MCG: 10 INJECTION INTRAVENOUS at 10:45

## 2023-10-20 RX ADMIN — PROPOFOL 200 MG: 10 INJECTION, EMULSION INTRAVENOUS at 10:22

## 2023-10-20 RX ADMIN — ROCURONIUM BROMIDE 50 MG: 50 INJECTION, SOLUTION INTRAVENOUS at 10:22

## 2023-10-20 RX ADMIN — PHENYLEPHRINE HYDROCHLORIDE 200 MCG: 10 INJECTION INTRAVENOUS at 10:31

## 2023-10-20 RX ADMIN — Medication 2.5 MG: at 09:37

## 2023-10-20 RX ADMIN — LIDOCAINE HYDROCHLORIDE 100 MG: 20 INJECTION, SOLUTION INFILTRATION; PERINEURAL at 10:22

## 2023-10-20 RX ADMIN — SODIUM CHLORIDE, POTASSIUM CHLORIDE, SODIUM LACTATE AND CALCIUM CHLORIDE: 600; 310; 30; 20 INJECTION, SOLUTION INTRAVENOUS at 11:31

## 2023-10-20 RX ADMIN — LISINOPRIL AND HYDROCHLOROTHIAZIDE TABLETS 1 TABLET: 20; 25 TABLET ORAL at 08:28

## 2023-10-20 RX ADMIN — MAGNESIUM SULFATE HEPTAHYDRATE 2 G: 40 INJECTION, SOLUTION INTRAVENOUS at 10:25

## 2023-10-20 RX ADMIN — LISINOPRIL AND HYDROCHLOROTHIAZIDE TABLETS 1 TABLET: 20; 25 TABLET ORAL at 17:37

## 2023-10-20 RX ADMIN — FENTANYL CITRATE 100 MCG: 50 INJECTION INTRAMUSCULAR; INTRAVENOUS at 10:22

## 2023-10-20 RX ADMIN — PHENYLEPHRINE HYDROCHLORIDE 200 MCG: 10 INJECTION INTRAVENOUS at 13:13

## 2023-10-20 RX ADMIN — PHENYLEPHRINE HYDROCHLORIDE 200 MCG: 10 INJECTION INTRAVENOUS at 10:28

## 2023-10-20 RX ADMIN — HYDROMORPHONE HYDROCHLORIDE 0.5 MG: 1 INJECTION, SOLUTION INTRAMUSCULAR; INTRAVENOUS; SUBCUTANEOUS at 01:52

## 2023-10-20 RX ADMIN — SENNOSIDES 8.6 MG: 8.6 TABLET, FILM COATED ORAL at 20:06

## 2023-10-20 RX ADMIN — GABAPENTIN 100 MG: 100 CAPSULE ORAL at 09:34

## 2023-10-20 RX ADMIN — KETOROLAC TROMETHAMINE 15 MG: 30 INJECTION, SOLUTION INTRAMUSCULAR at 13:16

## 2023-10-20 RX ADMIN — ONDANSETRON 4 MG: 2 INJECTION INTRAMUSCULAR; INTRAVENOUS at 00:10

## 2023-10-20 RX ADMIN — FUROSEMIDE 10 MG: 20 TABLET ORAL at 08:27

## 2023-10-20 RX ADMIN — ALBUTEROL SULFATE 2.5 MG: 2.5 SOLUTION RESPIRATORY (INHALATION) at 10:00

## 2023-10-20 RX ADMIN — PHENYLEPHRINE HYDROCHLORIDE 200 MCG: 10 INJECTION INTRAVENOUS at 10:39

## 2023-10-20 RX ADMIN — PHENYLEPHRINE HYDROCHLORIDE 200 MCG: 10 INJECTION INTRAVENOUS at 10:59

## 2023-10-20 RX ADMIN — PHENYLEPHRINE HYDROCHLORIDE 200 MCG: 10 INJECTION INTRAVENOUS at 13:04

## 2023-10-20 RX ADMIN — GLYCOPYRROLATE 0.1 MG: 0.2 INJECTION, SOLUTION INTRAMUSCULAR; INTRAVENOUS at 10:22

## 2023-10-20 RX ADMIN — PHENYLEPHRINE HYDROCHLORIDE 200 MCG: 10 INJECTION INTRAVENOUS at 12:00

## 2023-10-20 RX ADMIN — ACETAMINOPHEN 650 MG: 325 TABLET, FILM COATED ORAL at 17:08

## 2023-10-20 RX ADMIN — SODIUM CHLORIDE, SODIUM LACTATE, POTASSIUM CHLORIDE, CALCIUM CHLORIDE AND DEXTROSE MONOHYDRATE: 5; 600; 310; 30; 20 INJECTION, SOLUTION INTRAVENOUS at 02:56

## 2023-10-20 RX ADMIN — HEPARIN SODIUM 5000 UNITS: 10000 INJECTION, SOLUTION INTRAVENOUS; SUBCUTANEOUS at 10:30

## 2023-10-20 RX ADMIN — SODIUM CHLORIDE, POTASSIUM CHLORIDE, SODIUM LACTATE AND CALCIUM CHLORIDE 10 ML: 600; 310; 30; 20 INJECTION, SOLUTION INTRAVENOUS at 09:50

## 2023-10-20 RX ADMIN — ONDANSETRON 4 MG: 2 INJECTION INTRAMUSCULAR; INTRAVENOUS at 13:16

## 2023-10-20 RX ADMIN — ONDANSETRON 4 MG: 2 INJECTION INTRAMUSCULAR; INTRAVENOUS at 06:28

## 2023-10-20 RX ADMIN — PHENYLEPHRINE HYDROCHLORIDE 200 MCG: 10 INJECTION INTRAVENOUS at 10:53

## 2023-10-20 RX ADMIN — CEFAZOLIN 3 G: 1 INJECTION, POWDER, FOR SOLUTION INTRAMUSCULAR; INTRAVENOUS at 10:11

## 2023-10-20 RX ADMIN — AMLODIPINE BESYLATE 10 MG: 10 TABLET ORAL at 07:49

## 2023-10-20 RX ADMIN — PHENYLEPHRINE HYDROCHLORIDE 200 MCG: 10 INJECTION INTRAVENOUS at 10:34

## 2023-10-20 RX ADMIN — PHENYLEPHRINE HYDROCHLORIDE 200 MCG: 10 INJECTION INTRAVENOUS at 12:14

## 2023-10-20 RX ADMIN — FLUTICASONE PROPIONATE AND SALMETEROL 1 PUFF: 232; 14 POWDER, METERED RESPIRATORY (INHALATION) at 20:06

## 2023-10-20 RX ADMIN — CIPROFLOXACIN 400 MG: 2 INJECTION, SOLUTION INTRAVENOUS at 00:34

## 2023-10-20 RX ADMIN — IBUPROFEN 600 MG: 600 TABLET ORAL at 20:09

## 2023-10-20 RX ADMIN — PHENYLEPHRINE HYDROCHLORIDE 200 MCG: 10 INJECTION INTRAVENOUS at 12:23

## 2023-10-20 RX ADMIN — HYDROMORPHONE HYDROCHLORIDE 0.5 MG: 1 INJECTION, SOLUTION INTRAMUSCULAR; INTRAVENOUS; SUBCUTANEOUS at 06:30

## 2023-10-20 RX ADMIN — HYDROMORPHONE HYDROCHLORIDE 1 MG: 1 INJECTION, SOLUTION INTRAMUSCULAR; INTRAVENOUS; SUBCUTANEOUS at 11:25

## 2023-10-20 RX ADMIN — METOPROLOL SUCCINATE 100 MG: 100 TABLET, EXTENDED RELEASE ORAL at 08:25

## 2023-10-20 RX ADMIN — LISINOPRIL AND HYDROCHLOROTHIAZIDE TABLETS 1 TABLET: 20; 25 TABLET ORAL at 00:37

## 2023-10-20 RX ADMIN — SODIUM CHLORIDE, POTASSIUM CHLORIDE, SODIUM LACTATE AND CALCIUM CHLORIDE: 600; 310; 30; 20 INJECTION, SOLUTION INTRAVENOUS at 18:57

## 2023-10-20 RX ADMIN — DEXAMETHASONE SODIUM PHOSPHATE 8 MG: 4 INJECTION, SOLUTION INTRA-ARTICULAR; INTRALESIONAL; INTRAMUSCULAR; INTRAVENOUS; SOFT TISSUE at 10:22

## 2023-10-20 RX ADMIN — PHENYLEPHRINE HYDROCHLORIDE 200 MCG: 10 INJECTION INTRAVENOUS at 10:48

## 2023-10-20 RX ADMIN — ROSUVASTATIN CALCIUM 10 MG: 5 TABLET, FILM COATED ORAL at 17:08

## 2023-10-20 ASSESSMENT — ACTIVITIES OF DAILY LIVING (ADL)
ADLS_ACUITY_SCORE: 35
ADLS_ACUITY_SCORE: 35
ADLS_ACUITY_SCORE: 20
ADLS_ACUITY_SCORE: 20
ADLS_ACUITY_SCORE: 35
ADLS_ACUITY_SCORE: 35
ADLS_ACUITY_SCORE: 20
ADLS_ACUITY_SCORE: 20
ADLS_ACUITY_SCORE: 35
ADLS_ACUITY_SCORE: 35
ADLS_ACUITY_SCORE: 20
ADLS_ACUITY_SCORE: 35

## 2023-10-20 NOTE — ED PROVIDER NOTES
"     Emergency Department Patient Sign-out       Brief HPI:  This is a 73 year old male signed out to me by Dr. Stephenson .  See initial ED Provider note for details of the presentation.            Significant Events prior to my assuming care: none      Exam:   Patient Vitals for the past 24 hrs:   BP Temp Temp src Pulse Resp SpO2 Height Weight   10/20/23 0000 (!) 161/73 -- -- 79 -- 93 % -- --   10/19/23 2351 -- -- -- 78 -- 96 % -- --   10/19/23 2346 (!) 154/76 -- -- -- -- -- -- --   10/19/23 2230 -- -- -- 73 -- 96 % -- --   10/19/23 2206 131/69 -- -- 66 19 92 % -- --   10/19/23 2152 -- -- -- 80 (!) 34 91 % -- --   10/19/23 2137 131/58 -- -- 69 23 92 % -- --   10/19/23 2122 -- -- -- 72 20 91 % -- --   10/19/23 2107 (!) 142/67 -- -- 72 24 95 % -- --   10/19/23 2052 -- -- -- 69 (!) 9 95 % -- --   10/19/23 2037 (!) 143/69 -- -- 75 21 90 % -- --   10/19/23 2022 -- -- -- 64 14 92 % -- --   10/19/23 2007 (!) 149/68 -- -- 69 19 91 % -- --   10/19/23 1952 (!) 151/72 -- -- 68 14 92 % -- --   10/19/23 1937 -- -- -- 67 14 97 % -- --   10/19/23 1931 (!) 153/73 -- -- 64 -- 98 % -- --   10/19/23 1922 -- -- -- 68 17 95 % -- --   10/19/23 1916 136/72 -- -- 68 -- 92 % -- --   10/19/23 1907 (!) 147/67 -- -- 75 (!) 31 (!) 89 % -- --   10/19/23 1852 (!) 149/64 -- -- 65 18 98 % -- --   10/19/23 1837 (!) 158/81 -- -- 64 16 99 % -- --   10/19/23 1822 (!) 165/87 -- -- 69 -- -- -- --   10/19/23 1754 (!) 172/90 98.1  F (36.7  C) Tympanic 65 16 95 % 1.93 m (6' 4\") 138.8 kg (306 lb)           ED RESULTS:   Results for orders placed or performed during the hospital encounter of 10/19/23 (from the past 24 hour(s))   [place] Draw     Status: None    Collection Time: 10/19/23  6:18 PM    Narrative    The following orders were created for panel order [place] Draw.  Procedure                               Abnormality         Status                     ---------                               -----------         ------                     Extra Blue Top " Tube[[id number]]                              Final result               Extra Red Top Tube[[id number]]                               Final result               Extra Green Top (Lithium...[[id number]]                      Final result               Extra Purple Top Tube[[id number]]                            Final result                 Please view results for these tests on the individual orders.   Extra Blue Top Tube     Status: None    Collection Time: 10/19/23  6:18 PM   Result Value Ref Range    Hold Specimen JIC    Extra Red Top Tube     Status: None    Collection Time: 10/19/23  6:18 PM   Result Value Ref Range    Hold Specimen JIC    Extra Green Top (Lithium Heparin) Tube     Status: None    Collection Time: 10/19/23  6:18 PM   Result Value Ref Range    Hold Specimen JIC    Extra Purple Top Tube     Status: None    Collection Time: 10/19/23  6:18 PM   Result Value Ref Range    Hold Specimen JIC    Alcohol level blood     Status: Normal    Collection Time: 10/19/23  6:18 PM   Result Value Ref Range    Alcohol ethyl <0.01 <=0.01 g/dL   CBC with platelets, differential     Status: Abnormal    Collection Time: 10/19/23  6:18 PM    Narrative    The following orders were created for panel order CBC with platelets, differential.  Procedure                               Abnormality         Status                     ---------                               -----------         ------                     CBC with platelets and d...[[id number]]  Abnormal            Final result                 Please view results for these tests on the individual orders.   Comprehensive metabolic panel     Status: Abnormal    Collection Time: 10/19/23  6:18 PM   Result Value Ref Range    Sodium 137 135 - 145 mmol/L    Potassium 4.2 3.4 - 5.3 mmol/L    Carbon Dioxide (CO2) 25 22 - 29 mmol/L    Anion Gap 17 (H) 7 - 15 mmol/L    Urea Nitrogen 16.4 8.0 - 23.0 mg/dL    Creatinine 1.04 0.67 - 1.17 mg/dL    GFR Estimate 75 >60 mL/min/1.73m2     Calcium 10.2 8.8 - 10.2 mg/dL    Chloride 95 (L) 98 - 107 mmol/L    Glucose 117 (H) 70 - 99 mg/dL    Alkaline Phosphatase 65 40 - 129 U/L    AST 37 0 - 45 U/L    ALT 39 0 - 70 U/L    Protein Total 8.0 6.4 - 8.3 g/dL    Albumin 4.9 3.5 - 5.2 g/dL    Bilirubin Total 0.5 <=1.2 mg/dL   Lipase     Status: Normal    Collection Time: 10/19/23  6:18 PM   Result Value Ref Range    Lipase 21 13 - 60 U/L   CBC with platelets and differential     Status: Abnormal    Collection Time: 10/19/23  6:18 PM   Result Value Ref Range    WBC Count 17.4 (H) 4.0 - 11.0 10e3/uL    RBC Count 5.62 4.40 - 5.90 10e6/uL    Hemoglobin 16.2 13.3 - 17.7 g/dL    Hematocrit 47.8 40.0 - 53.0 %    MCV 85 78 - 100 fL    MCH 28.8 26.5 - 33.0 pg    MCHC 33.9 31.5 - 36.5 g/dL    RDW 14.6 10.0 - 15.0 %    Platelet Count 233 150 - 450 10e3/uL    % Neutrophils 87 %    % Lymphocytes 4 %    % Monocytes 9 %    Mids % (Monos, Eos, Basos)      % Eosinophils 0 %    % Basophils 0 %    % Immature Granulocytes 0 %    NRBCs per 100 WBC 0 <1 /100    Absolute Neutrophils 15.1 (H) 1.6 - 8.3 10e3/uL    Absolute Lymphocytes 0.6 (L) 0.8 - 5.3 10e3/uL    Absolute Monocytes 1.6 (H) 0.0 - 1.3 10e3/uL    Mids Abs (Monos, Eos, Basos)      Absolute Eosinophils 0.0 0.0 - 0.7 10e3/uL    Absolute Basophils 0.0 0.0 - 0.2 10e3/uL    Absolute Immature Granulocytes 0.1 <=0.4 10e3/uL    Absolute NRBCs 0.0 10e3/uL   US Abdomen Limited     Status: None    Collection Time: 10/19/23  8:34 PM    Narrative    EXAM: US ABDOMEN LIMITED  LOCATION: [facility]  DATE: 10/19/2023    INDICATION: Right upper quadrant pain  COMPARISON: Ultrasound 12/07/2017  TECHNIQUE: Limited abdominal ultrasound.    FINDINGS:    GALLBLADDER: Distended gallbladder with possible borderline wall thickening measuring up to 4 mm. No visualized gallstones or pericholecystic fluid. Negative sonographic Vargas's sign.    BILE DUCTS: No intrahepatic biliary dilatation. The common duct is at the upper  limit of normal in size measuring 7 mm.    LIVER: Diffusely increased parenchymal echogenicity with smooth contour. Stable size of simple appearing cyst in the right lobe measuring 1.0 cm.    RIGHT KIDNEY: No hydronephrosis.    PANCREAS: The pancreas is largely obscured by overlying gas.    No ascites.        Impression    IMPRESSION:  1.  Distended gallbladder with borderline wall thickening but no visualized cholelithiasis or other evidence of acute cholecystitis. Could consider CT if there is persistent clinical concern.  2.  No evidence of biliary obstruction.  3.  Hepatic steatosis.       CT Abdomen Pelvis w Contrast     Status: None    Collection Time: 10/19/23 11:27 PM    Narrative    EXAM: CT ABDOMEN PELVIS W CONTRAST  LOCATION: [facility]  DATE: 10/19/2023    INDICATION: Epigastric and right upper quadrant abdominal pain, distended gallbladder with wall thickening, CT recommended per radiology  COMPARISON: Right upper quadrant ultrasound today.  TECHNIQUE: CT scan of the abdomen and pelvis was performed following injection of IV contrast. Multiplanar reformats were obtained. Dose reduction techniques were used.  CONTRAST: 100mL Isovue 370    FINDINGS:   LOWER CHEST: Mild centrilobular emphysema incompletely imaged.    HEPATOBILIARY: The gallbladder is mildly distended, has a mildly thickened wall, and appears inflamed. No radiopaque gallstones visualized. Tiny benign cyst of the right hepatic lobe. No biliary dilatation.    PANCREAS: Normal.    SPLEEN: Normal.    ADRENAL GLANDS: Normal.    KIDNEYS/BLADDER: Normal.    BOWEL: Normal.    LYMPH NODES: Normal.    VASCULATURE: Mild aortoiliac atherosclerosis.    PELVIC ORGANS: Normal.    MUSCULOSKELETAL: Degenerative changes of the spine.      Impression    IMPRESSION:   CT appearance of the gallbladder is consistent with acute cholecystitis. No gallstones are visualized by CT.       ED MEDICATIONS:   Medications   HYDROmorphone (PF)  (DILAUDID) injection 0.5 mg (0.5 mg Intravenous $Given 10/19/23 1944)   lisinopril-hydrochlorothiazide (ZESTORETIC) 20-25 MG per tablet 1 tablet (has no administration in time range)   metoprolol succinate ER (TOPROL XL) 24 hr tablet 100 mg (has no administration in time range)   amLODIPine (NORVASC) tablet 10 mg (has no administration in time range)   furosemide (LASIX) half-tab 10 mg (has no administration in time range)   ondansetron (ZOFRAN) injection 4 mg (4 mg Intravenous $Given 10/20/23 0010)   sodium chloride 0.9% BOLUS 1,000 mL (0 mLs Intravenous Stopped 10/19/23 2350)   ondansetron (ZOFRAN) injection 4 mg (4 mg Intravenous $Given 10/19/23 1834)   ketorolac (TORADOL) injection 15 mg (15 mg Intravenous $Given 10/19/23 1943)   iopamidol (ISOVUE-370) solution 100 mL (100 mLs Intravenous $Given 10/19/23 2309)   sodium chloride 0.9 % bag 500mL for CT scan flush use (74 mLs As instructed $Given 10/19/23 2309)         Impression:    ICD-10-CM    1. Acute cholecystitis  K81.0           Plan:    Pending studies include CT abd - evaluate for pancreatitis or other pathology per surgical recommendation.     12:00 AM Patient assessed: Patient resting comfortably.  Reports pain is similar returning as is his nausea.  Abdominal exam shows distinct right upper quadrant tenderness with some guarding.  Reviewed CT results showing cholecystitis and plan to talk with surgery about surgery in the morning.        12:12 AM: Discussed with surgery, Dr Rosas. Reviewed the CT findings and my exam findings with him. He personally reviewed the CT images. NPO, Repeat labs at 6am. Plan on 730am OR time. Cipro/flagyl.     6:04 AM: Patient was signed out at shift change to Dr Carvajal. Going to OR this am     MD Ty Baca, Ralph Wyatt MD  10/20/23 5370

## 2023-10-20 NOTE — PROGRESS NOTES
Patient vital signs are at baseline: Yes  Patient able to ambulate as they were prior to admission or with assist devices provided by therapies during their stay:  Yes  Patient MUST void prior to discharge:  No,  Reason:  Unable to void. See flowsheet for PVR  Patient able to tolerate oral intake:  Yes  Pain has adequate pain control using Oral analgesics:  Yes  Does patient have an identified :  Yes  Has goal D/C date and time been discussed with patient:  Yes

## 2023-10-20 NOTE — H&P (VIEW-ONLY)
General Surgery Consultation / History and Physican    Jorge Alberto Frye MRN# [id number]   Age: 73 year old YOB: 1950     Date of Admission:  10/19/2023    Reason for consult:            Cholecystitis       Requesting physician:            Dr. Ralph Crawford                  Chief Complaint:   Abdominal pain, epigastric     History is obtained from the patient and EMR         History of Present Illness:     This patient is a 73 year old male who presents with epigastric and right upper quadrant abdominal pain for 1 day.  The pain is intermittent and aching and cramping.  Associated symptoms include with nausea and bloating.  He states this happened once before and 2006 and was told it was pancreatitis, though no etiology was identified.  Cholecystectomy was recommended at that time but was not performed at the request of the patient.  Continues to have some abdominal pain now, though this is improved with pain medication.  No dysuria, no hematuria, no diarrhea or constipation, no chest pain or shortness of breath.         Past Medical History:    has a past medical history of Acute pancreatitis (12/19/2006), Arthritis, Hypertension, Impotence, and Spermatocele.          Past Surgical History:     Past Surgical History:   Procedure Laterality Date    ARTHROPLASTY KNEE Left 5/31/2017    Procedure: ARTHROPLASTY KNEE;  Left Total Knee Arthroplasty;  Surgeon: Jorge Alberto Jurado MD;  Location: [facility]    ARTHROPLASTY KNEE Right 6/21/2023    Procedure: Right Total Knee Arthroplasty;  Surgeon: Jorge Alberto Jurado MD;  Location: [facility]    ARTHROPLASTY SHOULDER Right 11/17/2016    ARTHROPLASTY SHOULDER Right 11/17/2016    Procedure: ARTHROPLASTY SHOULDER;  Surgeon: Jorge Alberto Jurado MD;  Location: [facility]    ARTHROPLASTY SHOULDER Left 2/28/2018    Procedure: ARTHROPLASTY SHOULDER;  Left total shoulder arthroplasty, possible reverse;  Surgeon: Jorge Alberto Jurado MD;  Location: [facility]    HERNIA REPAIR       TONSILLECTOMY & ADENOIDECTOMY      UPPER GI ENDOSCOPY      gastritis     Additional abdominal surgery: Open umbilical hernia repair completed in the 80s per patient.          Social History:     Social History     Tobacco Use    Smoking status: Former     Types: Cigarettes     Quit date: 1984     Years since quittin.8    Smokeless tobacco: Never   Substance Use Topics    Alcohol use: Yes     Alcohol/week: 0.0 standard drinks of alcohol             Family History:   Noncontributory         Allergies:   All allergies reviewed and addressed          Medications:   No current facility-administered medications on file prior to encounter.  acetaminophen (TYLENOL) 325 MG tablet, Take 2 tablets (650 mg) by mouth every 4 hours as needed for other (mild pain)  albuterol (2.5 MG/3ML) 0.083% neb solution, Inhale 2.5 mg into the lungs every 4 hours as needed  albuterol (PROAIR HFA, PROVENTIL HFA, VENTOLIN HFA) 108 (90 BASE) MCG/ACT inhaler, Inhale 1-2 puffs into the lungs every 4 hours as needed   amLODIPine (NORVASC) 10 MG tablet, Take 1 tablet by mouth daily  aspirin (ASA) 325 MG EC tablet, Take 1 tablet (325 mg) by mouth daily  fluticasone-salmeterol (ADVAIR) 250-50 MCG/DOSE diskus inhaler, Inhale 1 puff into the lungs 2 times daily  furosemide (LASIX) 20 MG tablet, TAKE ONE-HALF TABLET BY  MOUTH IN THE MORNING  garlic 150 MG TABS, Take 150 mg by mouth daily  lisinopril-hydrochlorothiazide (PRINZIDE,ZESTORETIC) 20-25 MG per tablet, Take 1 tablet by mouth 2 times daily  methocarbamol (ROBAXIN) 500 MG tablet, Take 1 tablet (500 mg) by mouth 4 times daily as needed for other (pain)  metoprolol succinate ER (TOPROL XL) 100 MG 24 hr tablet, Take 1 tablet by mouth daily  MULTIPLE VITAMINS PO, Take 1 tablet by mouth daily   Omega-3 Fatty Acids (OMEGA-3 FISH OIL PO), Take 2 g by mouth daily   Omeprazole Magnesium (PRILOSEC OTC PO), Take 20 mg by mouth daily   oxyCODONE (ROXICODONE) 5 MG tablet, Take 1-2 tablets (5-10 mg) by  "mouth every 4 hours as needed for moderate to severe pain  rosuvastatin (CRESTOR) 10 MG tablet, Take 10 mg by mouth daily  senna-docusate (SENOKOT-S/PERICOLACE) 8.6-50 MG tablet, Take 1-2 tablets by mouth 2 times daily Take while on oral narcotics to prevent or treat constipation.  tadalafil (CIALIS) 5 MG tablet, Take 1 tablet by mouth daily as needed for erectile dysfunction Take 30 minutes before sexual activity  VITAMIN D, CHOLECALCIFEROL, PO, Take 5,000 Units by mouth daily       amLODIPine  10 mg Oral Daily    ciprofloxacin  400 mg Intravenous Q12H    furosemide  10 mg Oral Daily    lisinopril-hydrochlorothiazide  1 tablet Oral BID    metoprolol succinate ER  100 mg Oral Daily    metroNIDAZOLE  500 mg Intravenous Q12H            Review of Systems:   10 point ROS neg other than the symptoms noted above in the HPI.          Physical Exam:   /66   Pulse 75   Temp 98.1  F (36.7  C) (Tympanic)   Resp 19   Ht 1.93 m (6' 4\")   Wt 138.8 kg (306 lb)   SpO2 94%   BMI 37.25 kg/m    General - Well developed, well nourished male in no apparent distress  HEENT:  Head normocephalic and atraumatic, pupils equal and round, conjunctivae clear, no scleral icterus, mucous membranes moist, external ears and nose normal  Neck: Supple without thyromegaly or masses  Lymphatic: No cervical, or supraclavicular lymphadenopathy  Lungs: Nonlabored breathing on room air  Heart: regular rate and rhythm  Abdomen: obese, rounded, soft, tender RUQ, NonDistended. No rebound or guarding. no masses palpated, no hepatosplenomegally.  Extremities: Warm without edema  Neurologic: nonfocal  Psychiatric: Mood and affect appropriate  Skin: Without lesions or rashes, or juandice         Data:     Lab Results   Component Value Date    WBC 16.5 10/20/2023    WBC 7.6 06/05/2017     Lab Results   Component Value Date    HGB 13.9 10/20/2023    HGB 12.8 03/01/2018     Lab Results   Component Value Date     10/20/2023     06/05/2017 "     Last Basic Metabolic Panel:  Lab Results   Component Value Date     10/20/2023     06/03/2017      Lab Results   Component Value Date    POTASSIUM 3.8 10/20/2023    POTASSIUM 3.9 06/03/2017     Lab Results   Component Value Date    CHLORIDE 96 10/20/2023    CHLORIDE 105 06/03/2017     Lab Results   Component Value Date    ALEXANDER 8.8 10/20/2023    ALEXANDER 8.7 06/03/2017     Lab Results   Component Value Date    CO2 25 10/20/2023    CO2 24 06/03/2017     Lab Results   Component Value Date    BUN 18.7 10/20/2023    BUN 15 06/03/2017     Lab Results   Component Value Date    CR 1.15 10/20/2023    CR 1.01 06/03/2017     Lab Results   Component Value Date     10/20/2023     06/03/2017       Liver Function Studies -   Recent Labs   Lab Test 10/20/23  0446   PROTTOTAL 6.2*   ALBUMIN 3.9   BILITOTAL 0.5   ALKPHOS 55   AST 45   ALT 43       I personally reviewed these imaging films.  A formal report from radiology will follow.  CT scan of the abdomen:   No masses or free air. Gallbladder distended with mild wall thickening, no appreciable gallstones. Possible replaced right hepatic artery coming off celiac trunk     I personally reviewed these imaging films.  A formal report from radiology will follow.  Gallbladder ultrasound:   Common bile duct measures 7 mm, within normal limits given patient's age  Gallbladder wall thickening, no stones appreciated     Imaging:    Results for orders placed or performed during the hospital encounter of 10/19/23   US Abdomen Limited    Narrative    EXAM: US ABDOMEN LIMITED  LOCATION: [facility]  DATE: 10/19/2023    INDICATION: Right upper quadrant pain  COMPARISON: Ultrasound 12/07/2017  TECHNIQUE: Limited abdominal ultrasound.    FINDINGS:    GALLBLADDER: Distended gallbladder with possible borderline wall thickening measuring up to 4 mm. No visualized gallstones or pericholecystic fluid. Negative sonographic Vargas's sign.    BILE DUCTS:  No intrahepatic biliary dilatation. The common duct is at the upper limit of normal in size measuring 7 mm.    LIVER: Diffusely increased parenchymal echogenicity with smooth contour. Stable size of simple appearing cyst in the right lobe measuring 1.0 cm.    RIGHT KIDNEY: No hydronephrosis.    PANCREAS: The pancreas is largely obscured by overlying gas.    No ascites.        Impression    IMPRESSION:  1.  Distended gallbladder with borderline wall thickening but no visualized cholelithiasis or other evidence of acute cholecystitis. Could consider CT if there is persistent clinical concern.  2.  No evidence of biliary obstruction.  3.  Hepatic steatosis.       CT Abdomen Pelvis w Contrast    Narrative    EXAM: CT ABDOMEN PELVIS W CONTRAST  LOCATION: [facility]  DATE: 10/19/2023    INDICATION: Epigastric and right upper quadrant abdominal pain, distended gallbladder with wall thickening, CT recommended per radiology  COMPARISON: Right upper quadrant ultrasound today.  TECHNIQUE: CT scan of the abdomen and pelvis was performed following injection of IV contrast. Multiplanar reformats were obtained. Dose reduction techniques were used.  CONTRAST: 100mL Isovue 370    FINDINGS:   LOWER CHEST: Mild centrilobular emphysema incompletely imaged.    HEPATOBILIARY: The gallbladder is mildly distended, has a mildly thickened wall, and appears inflamed. No radiopaque gallstones visualized. Tiny benign cyst of the right hepatic lobe. No biliary dilatation.    PANCREAS: Normal.    SPLEEN: Normal.    ADRENAL GLANDS: Normal.    KIDNEYS/BLADDER: Normal.    BOWEL: Normal.    LYMPH NODES: Normal.    VASCULATURE: Mild aortoiliac atherosclerosis.    PELVIC ORGANS: Normal.    MUSCULOSKELETAL: Degenerative changes of the spine.      Impression    IMPRESSION:   CT appearance of the gallbladder is consistent with acute cholecystitis. No gallstones are visualized by CT.             Assessment and Plan:    Assessment:   Jorge Alberto Frye is a 73 year old male with PMHx of open umbilical hernia repair, prior pancreatitis of unknown etiology, obesity, HTN, who presents to the emergency department with 1 day of epigastric and right upper quadrant abdominal pain.  Found to have a leukocytosis, normal lipase.  Imaging shows a distended gallbladder with some associated wall thickening, though no gallstones clearly visualized.  No jaundice and no common bile duct dilation.  Based on these imaging findings, I suspect that the patient either has some very small gallstones or biliary sludge which is led to acute cholecystitis.      Plan:   Robotic-assisted cholecystectomy.     The pathophysiology of gallbladder disease and complications of cholecystitis and choledocholithiasis were discussed with the patient. The risks associated with the procedure including, but not limited to, infection, bleeding, bile leak, bile duct injury, retained gallstones, drain placement, conversion to open and anesthesia complications were discussed with the patient.  Possible complications requiring further surgical intervention during or after the procedure were also discussed. The patient indicated understanding of the discussion, asked appropriate questions, and wishes to proceed with surgery. Based on intra-op findings, possibly able to discharge home today vs observe overnight          Time spent with the patient, reviewing the EMR, reviewing laboratory and imaging studies, counseling, educating and coordinating care:  60 minutes.    Geo Tirado MD

## 2023-10-20 NOTE — OP NOTE
OPERATIVE REPORT         Jorge Alberto Frye   : 1950   Sex: male   MRN: [id number]  10/20/2023 2:06 PM         Procedures Performed  Robotic-assisted cholecystectomy    Indications: Jorge Alberto Frye is a 73-year-old male who presented to the emergency department with acute abdominal pain in the epigastrium and right upper quadrant.  Clinical evaluation and work-up revealed acute cholecystitis.  After a discussion with the patient regarding therapeutic options, risks, and benefits, a robotic-assisted cholecystectomy was recommended. Patient was amenable to the proposed plan    Pre-operative Diagnosis: Acute cholecystitis    Post-operative Diagnosis: Acute on chronic cholecystitis    Surgeon:  Geo Tirado MD Ashley Witek, PA - assistance was necessary for retraction, specimen retrieval, and instrument swapping during the case    Anesthesia: General    Procedure Details  The patient was seen in the Holding Room. The risks, benefits, indications, potential complications, treatment options, and expected outcomes were discussed with the patient. The possibilities of reaction to medication, bleeding, infection, the need for additional procedures, failure to diagnose a condition, and creating a complication requiring transfusion or further operation were discussed with the patient. The patient concurred with the proposed plan, giving informed consent.  The site of surgery was properly noted/marked. The patient was taken to the operating room, identified as Jorge Alberto Frye and the procedure verified as robotic-assisted cholecystectomy. A Time Out was held and the above information confirmed.    The patient was placed in the supine position and general anesthetic was administered. Pre-operative antibiotics were administered.  The abdomen was prepped and draped in standard fashion.       An incision was made in the LUQ and, using a 5-mm optical entry trocar was placed under direct visualization to enter the  abdomen. Following the smooth establishment of the abdomen was surveyed and no bleeding or injury was identified. A total of three 8-mm robotic ports were placed under direct visualization - one to the left of the umbilicus and two right flank working ports. The 5 mm port was then upsized to a 12-mm robotic port. The patient was placed in reverse trendelenburg and the bed was airplaned to the patient's left. The robot was then docked.       Graspers were inserted and the gallbladder was grasped and retracted.  The gallbladder was noted to be markedly inflamed and tense.  To assist with retraction, the gallbladder was aspirated with return of purulence and bile.  The cystic duct and cystic artery were then dissected out using combination of electrocautery and blunt dissection with the suction . This was done in such a manner that the cystic duct and artery were seen to course directly into the gallbladder and both structures were completely free from the liver bed.  During the course of the dissection, there was some gross spillage of bile.  With the critical view achieved, a clip applier was used to place surgical clips across both the cystic duct and the artery. Two clips were placed proximally and one distally on the duct and artery. The robotic scissors were used to transect the cystic duct and artery.  The gallbladder was then dissected free from the gallbladder bed using electrocautery. The clips were reinspected and seen to be in excellent position. There was no evidence of bleeding or bile leakage from either the cystic duct stump, cystic artery stumps or the gallbladder bed itself.      At this point the gallbladder was placed within an endocatch bag via the 12-mm port. The gallbladder bed was again inspected.  This was copiously irrigated with sterile saline and hemostasis was assured. The robot was undocked. The gallbladder was then withdrawn from the abdomen and passed of the field as a specimen.  The 12-mm port was removed and the fascia of the 12-mm port was closed using an 0 vicryl figure-of-eight suture placed with the Shmuel-Michael device. All remaining port sites were inspected and removed under direct visualization with no evidence of bleeding. The abdomen was completely desufflated. All port sites were then closed at the skin using interrupted 4-0 Monocryl subcuticular sutures. Dermabond was then applied, and the patient was awakened and extubated by Anesthesia and transported to Postanesthesia Care Unit (PACU) in good condition    Instrument, sponge, and needle counts were correct at closure and at the conclusion of the case.    Findings: Acute on chronic cholecystitis, robotic-assisted cholecystectomy performed without complication    Estimated Blood Loss:  30        Drains: None        Total IV Fluids:  See anesthesia        Specimens: Gallbladder    Complications:  NoneNone        Disposition: PACU, admit patient for observation overnight          Geo Tirado MD

## 2023-10-20 NOTE — ED PROVIDER NOTES
History     Chief Complaint   Patient presents with    Nausea     HPI  Jorge Alberto Frye is a 73 year old male, past medical history reviewed as below, presents to the emergency department with concerns of epigastric and right upper quadrant abdominal pain intermittent colicky achy beginning around 1030 this morning after eating a bowl of grits and a cup of coffee for breakfast.  Some nausea and dry heaves earlier in the day, is now better with respect to the nausea after Zofran.  No fever chills or sweats.  Normal bowel movement yesterday none today.  No urinary tract symptoms.  Symptoms are vaguely similar to the patient when he had pancreatitis in the distant past without apparent etiology no alcohol.      Allergies:  Allergies   Allergen Reactions    Spironolactone Other (See Comments) and Swelling       Problem List:    Patient Active Problem List    Diagnosis Date Noted    S/P total knee arthroplasty, right 06/28/2023     Priority: Medium    Right knee DJD 06/21/2023     Priority: Medium    Status post total replacement of left shoulder 02/28/2018     Priority: Medium    DJD of left shoulder 02/28/2018     Priority: Medium    RITA (acute kidney injury) (H24) 06/01/2017     Priority: Medium    Status post total left knee replacement 05/31/2017     Priority: Medium    CAD (coronary artery disease) 05/31/2017     Priority: Medium     Questionable MI in [place] 03/2017      Status post total shoulder arthroplasty, right 11/17/2016     Priority: Medium    Primary osteoarthritis of both knees 12/01/2015     Priority: Medium    Elevated prostate specific antigen (PSA) 11/30/2014     Priority: Medium     Overview:   Bx urology    2015        Cyst of epididymis 12/23/2008     Priority: Medium    Low back pain 12/23/2008     Priority: Medium     Overview:   L5 S1  retrolisthesis??     DDD      Stricture of esophagus 12/23/2008     Priority: Medium    Hyperlipidemia 12/19/2006     Priority: Medium    Encounter for  screening for malignant neoplasm of colon 12/19/2006     Priority: Medium     Overview:   flex sig   2003  normal  colonoscopy  Dr Singletray    3/08      3/1/08 colonoscopy performed by Dr. Mat Durand - normal,   colonoscopy   9/2016    polyp    due   5 yrs        Gastroesophageal reflux disease 03/10/2006     Priority: Medium     Overview:   hx  esoph  dilation       for  stricture       2005   Nexium   works  best    HISTORY   recurrant   stricture and dialtiosn      failed  protonix,   and   prilosec  20-40                      nexium  40   works  best       EGD 1/2012    esophagitis    and   positive   candida  Rx   2  weeks    diflucan        Benign essential hypertension 03/10/2006     Priority: Medium    Impotence of organic origin 03/10/2006     Priority: Medium    Asthma 03/10/2006     Priority: Medium        Past Medical History:    Past Medical History:   Diagnosis Date    Acute pancreatitis 12/19/2006    Arthritis     Hypertension     Impotence     Spermatocele        Past Surgical History:    Past Surgical History:   Procedure Laterality Date    ARTHROPLASTY KNEE Left 5/31/2017    Procedure: ARTHROPLASTY KNEE;  Left Total Knee Arthroplasty;  Surgeon: Jorge Alberto Jurado MD;  Location: [facility]    ARTHROPLASTY KNEE Right 6/21/2023    Procedure: Right Total Knee Arthroplasty;  Surgeon: Jorge Alberto Jurado MD;  Location: [facility]    ARTHROPLASTY SHOULDER Right 11/17/2016    ARTHROPLASTY SHOULDER Right 11/17/2016    Procedure: ARTHROPLASTY SHOULDER;  Surgeon: Jorge Alberto Jurado MD;  Location: [facility]    ARTHROPLASTY SHOULDER Left 2/28/2018    Procedure: ARTHROPLASTY SHOULDER;  Left total shoulder arthroplasty, possible reverse;  Surgeon: Jorge Alberto Jurado MD;  Location: [facility] OR    HERNIA REPAIR      TONSILLECTOMY & ADENOIDECTOMY      UPPER GI ENDOSCOPY      gastritis       Family History:    Family History   Problem Relation Age of Onset    Lung Cancer Mother     Cervical Cancer Mother     Prostate Cancer Father      "Lung Cancer Maternal Grandfather     Breast Cancer Sister        Social History:  Marital Status:   [2]  Social History     Tobacco Use    Smoking status: Former     Types: Cigarettes     Quit date: 1984     Years since quittin.8    Smokeless tobacco: Never   Substance Use Topics    Alcohol use: Yes     Alcohol/week: 0.0 standard drinks of alcohol        Medications:    acetaminophen (TYLENOL) 325 MG tablet  albuterol (2.5 MG/3ML) 0.083% neb solution  albuterol (PROAIR HFA, PROVENTIL HFA, VENTOLIN HFA) 108 (90 BASE) MCG/ACT inhaler  amLODIPine (NORVASC) 10 MG tablet  aspirin (ASA) 325 MG EC tablet  fluticasone-salmeterol (ADVAIR) 250-50 MCG/DOSE diskus inhaler  furosemide (LASIX) 20 MG tablet  garlic 150 MG TABS  lisinopril-hydrochlorothiazide (PRINZIDE,ZESTORETIC) 20-25 MG per tablet  methocarbamol (ROBAXIN) 500 MG tablet  metoprolol succinate ER (TOPROL XL) 100 MG 24 hr tablet  MULTIPLE VITAMINS PO  Omega-3 Fatty Acids (OMEGA-3 FISH OIL PO)  Omeprazole Magnesium (PRILOSEC OTC PO)  oxyCODONE (ROXICODONE) 5 MG tablet  rosuvastatin (CRESTOR) 10 MG tablet  senna-docusate (SENOKOT-S/PERICOLACE) 8.6-50 MG tablet  tadalafil (CIALIS) 5 MG tablet  VITAMIN D, CHOLECALCIFEROL, PO          Review of Systems   All other systems reviewed and are negative.      Physical Exam   BP: (!) 172/90  Pulse: 65  Temp: 98.1  F (36.7  C)  Resp: 16  Height: 193 cm (6' 4\")  Weight: 138.8 kg (306 lb)  SpO2: 95 %      Physical Exam  Vitals and nursing note reviewed.   Constitutional:       General: He is not in acute distress.     Appearance: Normal appearance. He is normal weight. He is not ill-appearing.   HENT:      Head: Normocephalic and atraumatic.      Right Ear: Tympanic membrane normal.      Left Ear: Tympanic membrane normal.      Nose: Nose normal.      Mouth/Throat:      Mouth: Mucous membranes are dry.      Pharynx: Oropharynx is clear.   Eyes:      Extraocular Movements: Extraocular movements intact.      " Conjunctiva/sclera: Conjunctivae normal.      Pupils: Pupils are equal, round, and reactive to light.   Cardiovascular:      Rate and Rhythm: Normal rate and regular rhythm.      Pulses: Normal pulses.      Heart sounds: Normal heart sounds.   Pulmonary:      Effort: Pulmonary effort is normal.      Breath sounds: Normal breath sounds.   Abdominal:      Comments: Soft, obese abdomen difficult to examine tender epigastrium and right upper quadrant positive Vargas sign.  No CVA tenderness no rebound no guarding.     Musculoskeletal:      Cervical back: Normal range of motion and neck supple.   Skin:     General: Skin is warm and dry.      Capillary Refill: Capillary refill takes less than 2 seconds.   Neurological:      General: No focal deficit present.      Mental Status: He is alert and oriented to person, place, and time.   Psychiatric:         Mood and Affect: Mood normal.         Behavior: Behavior normal.         ED Course                 Procedures      Results for orders placed or performed during the hospital encounter of 10/19/23 (from the past 24 hour(s))   House Draw    Narrative    The following orders were created for panel order House Draw.  Procedure                               Abnormality         Status                     ---------                               -----------         ------                     Extra Blue Top Tube[[id number]]                              Final result               Extra Red Top Tube[[id number]]                               Final result               Extra Green Top (Lithium...[[id number]]                      Final result               Extra Purple Top Tube[[id number]]                            Final result                 Please view results for these tests on the individual orders.   Extra Blue Top Tube   Result Value Ref Range    Hold Specimen JIC    Extra Red Top Tube   Result Value Ref Range    Hold Specimen JIC    Extra Green Top (Lithium Heparin) Tube    Result Value Ref Range    Hold Specimen JI    Extra Purple Top Tube   Result Value Ref Range    Hold Specimen JI    Alcohol level blood   Result Value Ref Range    Alcohol ethyl <0.01 <=0.01 g/dL   CBC with platelets, differential    Narrative    The following orders were created for panel order CBC with platelets, differential.  Procedure                               Abnormality         Status                     ---------                               -----------         ------                     CBC with platelets and d...[[id number]]  Abnormal            Final result                 Please view results for these tests on the individual orders.   Comprehensive metabolic panel   Result Value Ref Range    Sodium 137 135 - 145 mmol/L    Potassium 4.2 3.4 - 5.3 mmol/L    Carbon Dioxide (CO2) 25 22 - 29 mmol/L    Anion Gap 17 (H) 7 - 15 mmol/L    Urea Nitrogen 16.4 8.0 - 23.0 mg/dL    Creatinine 1.04 0.67 - 1.17 mg/dL    GFR Estimate 75 >60 mL/min/1.73m2    Calcium 10.2 8.8 - 10.2 mg/dL    Chloride 95 (L) 98 - 107 mmol/L    Glucose 117 (H) 70 - 99 mg/dL    Alkaline Phosphatase 65 40 - 129 U/L    AST 37 0 - 45 U/L    ALT 39 0 - 70 U/L    Protein Total 8.0 6.4 - 8.3 g/dL    Albumin 4.9 3.5 - 5.2 g/dL    Bilirubin Total 0.5 <=1.2 mg/dL   Lipase   Result Value Ref Range    Lipase 21 13 - 60 U/L   CBC with platelets and differential   Result Value Ref Range    WBC Count 17.4 (H) 4.0 - 11.0 10e3/uL    RBC Count 5.62 4.40 - 5.90 10e6/uL    Hemoglobin 16.2 13.3 - 17.7 g/dL    Hematocrit 47.8 40.0 - 53.0 %    MCV 85 78 - 100 fL    MCH 28.8 26.5 - 33.0 pg    MCHC 33.9 31.5 - 36.5 g/dL    RDW 14.6 10.0 - 15.0 %    Platelet Count 233 150 - 450 10e3/uL    % Neutrophils 87 %    % Lymphocytes 4 %    % Monocytes 9 %    Mids % (Monos, Eos, Basos)      % Eosinophils 0 %    % Basophils 0 %    % Immature Granulocytes 0 %    NRBCs per 100 WBC 0 <1 /100    Absolute Neutrophils 15.1 (H) 1.6 - 8.3 10e3/uL    Absolute Lymphocytes 0.6 (L)  0.8 - 5.3 10e3/uL    Absolute Monocytes 1.6 (H) 0.0 - 1.3 10e3/uL    Mids Abs (Monos, Eos, Basos)      Absolute Eosinophils 0.0 0.0 - 0.7 10e3/uL    Absolute Basophils 0.0 0.0 - 0.2 10e3/uL    Absolute Immature Granulocytes 0.1 <=0.4 10e3/uL    Absolute NRBCs 0.0 10e3/uL   US Abdomen Limited    Narrative    EXAM: US ABDOMEN LIMITED  LOCATION: [facility]  DATE: 10/19/2023    INDICATION: Right upper quadrant pain  COMPARISON: Ultrasound 12/07/2017  TECHNIQUE: Limited abdominal ultrasound.    FINDINGS:    GALLBLADDER: Distended gallbladder with possible borderline wall thickening measuring up to 4 mm. No visualized gallstones or pericholecystic fluid. Negative sonographic Vargas's sign.    BILE DUCTS: No intrahepatic biliary dilatation. The common duct is at the upper limit of normal in size measuring 7 mm.    LIVER: Diffusely increased parenchymal echogenicity with smooth contour. Stable size of simple appearing cyst in the right lobe measuring 1.0 cm.    RIGHT KIDNEY: No hydronephrosis.    PANCREAS: The pancreas is largely obscured by overlying gas.    No ascites.        Impression    IMPRESSION:  1.  Distended gallbladder with borderline wall thickening but no visualized cholelithiasis or other evidence of acute cholecystitis. Could consider CT if there is persistent clinical concern.  2.  No evidence of biliary obstruction.  3.  Hepatic steatosis.       9:33 PM  The patient is pain-free after 1 dose of Dilaudid.  I reviewed lab diagnostics and imaging with him I think his presentation is most consistent with acute cholecystitis.  Paged general surgeon several times without response so we will try a third time.    10:12 PM  Spoke with on-call general surgery.  Dr. Adams.  They feel the only thing consistent with acute cholecystitis is the right upper quadrant pain.  They felt that we should obtain CT to evaluate for possibility of pancreatitis even though the patient's lipase is  negative.  He is currently pain-free.  I will place order for CT of the abdomen and discussing with my colleague on overnight Dr. Josh Crawford.  Disposition will be by Dr. Crawford.        Medications   HYDROmorphone (PF) (DILAUDID) injection 0.5 mg (0.5 mg Intravenous $Given 10/19/23 1944)   sodium chloride 0.9% BOLUS 1,000 mL (1,000 mLs Intravenous $New Bag 10/19/23 1833)   ondansetron (ZOFRAN) injection 4 mg (4 mg Intravenous $Given 10/19/23 1834)   ketorolac (TORADOL) injection 15 mg (15 mg Intravenous $Given 10/19/23 1943)       Assessments & Plan (with Medical Decision Making)     I have reviewed the nursing notes.    I have reviewed the findings, diagnosis, plan and need for follow up with the patient.      New Prescriptions    No medications on file       Final diagnoses:   Acute cholecystitis       10/19/2023   [facility] EMERGENCY DEPT       Taran Stephenson MD  10/23/23 0951

## 2023-10-20 NOTE — ANESTHESIA PROCEDURE NOTES
Airway       Patient location during procedure: OR       Procedure Start/Stop Times: 10/20/2023 10:24 AM  Staff -        CRNA: Rocio Hewitt APRN CRNA       Performed By: CRNA  Consent for Airway        Urgency: elective  Indications and Patient Condition       Indications for airway management: medhat-procedural and airway protection       Induction type:intravenous       Mask difficulty assessment: 1 - vent by mask    Final Airway Details       Final airway type: endotracheal airway       Successful airway: ETT - single  Endotracheal Airway Details        ETT size (mm): 7.5       Cuffed: yes       Successful intubation technique: video laryngoscopy       VL Blade Size: Arellano 3       Grade View of Cords: 1       Adjucts: stylet       Position: Right       Measured from: gums/teeth       Secured at (cm): 24       Bite block used: None    Post intubation assessment        Placement verified by: capnometry, equal breath sounds and chest rise        Number of attempts at approach: 1       Number of other approaches attempted: 0       Secured with: silk tape       Ease of procedure: easy       Dentition: Intact and Unchanged    Medication(s) Administered   Medication Administration Time: 10/20/2023 10:24 AM

## 2023-10-20 NOTE — TELEPHONE ENCOUNTER
Type of surgery: CHOLECYSTECTOMY, ROBOT-ASSISTED, LAPAROSCOPIC, USING DA NAMRATA XI   Location of surgery: [place] OR  Date and time of surgery: 10/20  Surgeon: Candida   Pre-Op Appt Date: emergent   Post-Op Appt Date: will call    Packet sent out: No  Pre-cert/Authorization completed:  Not Applicable  Date:

## 2023-10-20 NOTE — CONSULTS
General Surgery Consultation / History and Physican    Jorge Alberto Frye MRN# [id number]   Age: 73 year old YOB: 1950     Date of Admission:  10/19/2023    Reason for consult:            Cholecystitis       Requesting physician:            Dr. Ralph Crawford                  Chief Complaint:   Abdominal pain, epigastric     History is obtained from the patient and EMR         History of Present Illness:     This patient is a 73 year old male who presents with epigastric and right upper quadrant abdominal pain for 1 day.  The pain is intermittent and aching and cramping.  Associated symptoms include with nausea and bloating.  He states this happened once before and 2006 and was told it was pancreatitis, though no etiology was identified.  Cholecystectomy was recommended at that time but was not performed at the request of the patient.  Continues to have some abdominal pain now, though this is improved with pain medication.  No dysuria, no hematuria, no diarrhea or constipation, no chest pain or shortness of breath.         Past Medical History:    has a past medical history of Acute pancreatitis (12/19/2006), Arthritis, Hypertension, Impotence, and Spermatocele.          Past Surgical History:     Past Surgical History:   Procedure Laterality Date    ARTHROPLASTY KNEE Left 5/31/2017    Procedure: ARTHROPLASTY KNEE;  Left Total Knee Arthroplasty;  Surgeon: Jorge Alberto Jurado MD;  Location: [facility]    ARTHROPLASTY KNEE Right 6/21/2023    Procedure: Right Total Knee Arthroplasty;  Surgeon: Jorge Alberto Jurado MD;  Location: [facility]    ARTHROPLASTY SHOULDER Right 11/17/2016    ARTHROPLASTY SHOULDER Right 11/17/2016    Procedure: ARTHROPLASTY SHOULDER;  Surgeon: Jorge Alberto Jurado MD;  Location: [facility]    ARTHROPLASTY SHOULDER Left 2/28/2018    Procedure: ARTHROPLASTY SHOULDER;  Left total shoulder arthroplasty, possible reverse;  Surgeon: Jorge Alberto Jurado MD;  Location: [facility]    HERNIA REPAIR       TONSILLECTOMY & ADENOIDECTOMY      UPPER GI ENDOSCOPY      gastritis     Additional abdominal surgery: Open umbilical hernia repair completed in the 80s per patient.          Social History:     Social History     Tobacco Use    Smoking status: Former     Types: Cigarettes     Quit date: 1984     Years since quittin.8    Smokeless tobacco: Never   Substance Use Topics    Alcohol use: Yes     Alcohol/week: 0.0 standard drinks of alcohol             Family History:   Noncontributory         Allergies:   All allergies reviewed and addressed          Medications:   No current facility-administered medications on file prior to encounter.  acetaminophen (TYLENOL) 325 MG tablet, Take 2 tablets (650 mg) by mouth every 4 hours as needed for other (mild pain)  albuterol (2.5 MG/3ML) 0.083% neb solution, Inhale 2.5 mg into the lungs every 4 hours as needed  albuterol (PROAIR HFA, PROVENTIL HFA, VENTOLIN HFA) 108 (90 BASE) MCG/ACT inhaler, Inhale 1-2 puffs into the lungs every 4 hours as needed   amLODIPine (NORVASC) 10 MG tablet, Take 1 tablet by mouth daily  aspirin (ASA) 325 MG EC tablet, Take 1 tablet (325 mg) by mouth daily  fluticasone-salmeterol (ADVAIR) 250-50 MCG/DOSE diskus inhaler, Inhale 1 puff into the lungs 2 times daily  furosemide (LASIX) 20 MG tablet, TAKE ONE-HALF TABLET BY  MOUTH IN THE MORNING  garlic 150 MG TABS, Take 150 mg by mouth daily  lisinopril-hydrochlorothiazide (PRINZIDE,ZESTORETIC) 20-25 MG per tablet, Take 1 tablet by mouth 2 times daily  methocarbamol (ROBAXIN) 500 MG tablet, Take 1 tablet (500 mg) by mouth 4 times daily as needed for other (pain)  metoprolol succinate ER (TOPROL XL) 100 MG 24 hr tablet, Take 1 tablet by mouth daily  MULTIPLE VITAMINS PO, Take 1 tablet by mouth daily   Omega-3 Fatty Acids (OMEGA-3 FISH OIL PO), Take 2 g by mouth daily   Omeprazole Magnesium (PRILOSEC OTC PO), Take 20 mg by mouth daily   oxyCODONE (ROXICODONE) 5 MG tablet, Take 1-2 tablets (5-10 mg) by  "mouth every 4 hours as needed for moderate to severe pain  rosuvastatin (CRESTOR) 10 MG tablet, Take 10 mg by mouth daily  senna-docusate (SENOKOT-S/PERICOLACE) 8.6-50 MG tablet, Take 1-2 tablets by mouth 2 times daily Take while on oral narcotics to prevent or treat constipation.  tadalafil (CIALIS) 5 MG tablet, Take 1 tablet by mouth daily as needed for erectile dysfunction Take 30 minutes before sexual activity  VITAMIN D, CHOLECALCIFEROL, PO, Take 5,000 Units by mouth daily       amLODIPine  10 mg Oral Daily    ciprofloxacin  400 mg Intravenous Q12H    furosemide  10 mg Oral Daily    lisinopril-hydrochlorothiazide  1 tablet Oral BID    metoprolol succinate ER  100 mg Oral Daily    metroNIDAZOLE  500 mg Intravenous Q12H            Review of Systems:   10 point ROS neg other than the symptoms noted above in the HPI.          Physical Exam:   /66   Pulse 75   Temp 98.1  F (36.7  C) (Tympanic)   Resp 19   Ht 1.93 m (6' 4\")   Wt 138.8 kg (306 lb)   SpO2 94%   BMI 37.25 kg/m    General - Well developed, well nourished male in no apparent distress  HEENT:  Head normocephalic and atraumatic, pupils equal and round, conjunctivae clear, no scleral icterus, mucous membranes moist, external ears and nose normal  Neck: Supple without thyromegaly or masses  Lymphatic: No cervical, or supraclavicular lymphadenopathy  Lungs: Nonlabored breathing on room air  Heart: regular rate and rhythm  Abdomen: obese, rounded, soft, tender RUQ, NonDistended. No rebound or guarding. no masses palpated, no hepatosplenomegally.  Extremities: Warm without edema  Neurologic: nonfocal  Psychiatric: Mood and affect appropriate  Skin: Without lesions or rashes, or juandice         Data:     Lab Results   Component Value Date    WBC 16.5 10/20/2023    WBC 7.6 06/05/2017     Lab Results   Component Value Date    HGB 13.9 10/20/2023    HGB 12.8 03/01/2018     Lab Results   Component Value Date     10/20/2023     06/05/2017 "     Last Basic Metabolic Panel:  Lab Results   Component Value Date     10/20/2023     06/03/2017      Lab Results   Component Value Date    POTASSIUM 3.8 10/20/2023    POTASSIUM 3.9 06/03/2017     Lab Results   Component Value Date    CHLORIDE 96 10/20/2023    CHLORIDE 105 06/03/2017     Lab Results   Component Value Date    ALEXANDER 8.8 10/20/2023    ALEXANDER 8.7 06/03/2017     Lab Results   Component Value Date    CO2 25 10/20/2023    CO2 24 06/03/2017     Lab Results   Component Value Date    BUN 18.7 10/20/2023    BUN 15 06/03/2017     Lab Results   Component Value Date    CR 1.15 10/20/2023    CR 1.01 06/03/2017     Lab Results   Component Value Date     10/20/2023     06/03/2017       Liver Function Studies -   Recent Labs   Lab Test 10/20/23  0446   PROTTOTAL 6.2*   ALBUMIN 3.9   BILITOTAL 0.5   ALKPHOS 55   AST 45   ALT 43       I personally reviewed these imaging films.  A formal report from radiology will follow.  CT scan of the abdomen:   No masses or free air. Gallbladder distended with mild wall thickening, no appreciable gallstones. Possible replaced right hepatic artery coming off celiac trunk     I personally reviewed these imaging films.  A formal report from radiology will follow.  Gallbladder ultrasound:   Common bile duct measures 7 mm, within normal limits given patient's age  Gallbladder wall thickening, no stones appreciated     Imaging:    Results for orders placed or performed during the hospital encounter of 10/19/23   US Abdomen Limited    Narrative    EXAM: US ABDOMEN LIMITED  LOCATION: [facility]  DATE: 10/19/2023    INDICATION: Right upper quadrant pain  COMPARISON: Ultrasound 12/07/2017  TECHNIQUE: Limited abdominal ultrasound.    FINDINGS:    GALLBLADDER: Distended gallbladder with possible borderline wall thickening measuring up to 4 mm. No visualized gallstones or pericholecystic fluid. Negative sonographic Vargas's sign.    BILE DUCTS:  No intrahepatic biliary dilatation. The common duct is at the upper limit of normal in size measuring 7 mm.    LIVER: Diffusely increased parenchymal echogenicity with smooth contour. Stable size of simple appearing cyst in the right lobe measuring 1.0 cm.    RIGHT KIDNEY: No hydronephrosis.    PANCREAS: The pancreas is largely obscured by overlying gas.    No ascites.        Impression    IMPRESSION:  1.  Distended gallbladder with borderline wall thickening but no visualized cholelithiasis or other evidence of acute cholecystitis. Could consider CT if there is persistent clinical concern.  2.  No evidence of biliary obstruction.  3.  Hepatic steatosis.       CT Abdomen Pelvis w Contrast    Narrative    EXAM: CT ABDOMEN PELVIS W CONTRAST  LOCATION: [facility]  DATE: 10/19/2023    INDICATION: Epigastric and right upper quadrant abdominal pain, distended gallbladder with wall thickening, CT recommended per radiology  COMPARISON: Right upper quadrant ultrasound today.  TECHNIQUE: CT scan of the abdomen and pelvis was performed following injection of IV contrast. Multiplanar reformats were obtained. Dose reduction techniques were used.  CONTRAST: 100mL Isovue 370    FINDINGS:   LOWER CHEST: Mild centrilobular emphysema incompletely imaged.    HEPATOBILIARY: The gallbladder is mildly distended, has a mildly thickened wall, and appears inflamed. No radiopaque gallstones visualized. Tiny benign cyst of the right hepatic lobe. No biliary dilatation.    PANCREAS: Normal.    SPLEEN: Normal.    ADRENAL GLANDS: Normal.    KIDNEYS/BLADDER: Normal.    BOWEL: Normal.    LYMPH NODES: Normal.    VASCULATURE: Mild aortoiliac atherosclerosis.    PELVIC ORGANS: Normal.    MUSCULOSKELETAL: Degenerative changes of the spine.      Impression    IMPRESSION:   CT appearance of the gallbladder is consistent with acute cholecystitis. No gallstones are visualized by CT.             Assessment and Plan:    Assessment:   Jorge Alberto Frye is a 73 year old male with PMHx of open umbilical hernia repair, prior pancreatitis of unknown etiology, obesity, HTN, who presents to the emergency department with 1 day of epigastric and right upper quadrant abdominal pain.  Found to have a leukocytosis, normal lipase.  Imaging shows a distended gallbladder with some associated wall thickening, though no gallstones clearly visualized.  No jaundice and no common bile duct dilation.  Based on these imaging findings, I suspect that the patient either has some very small gallstones or biliary sludge which is led to acute cholecystitis.      Plan:   Robotic-assisted cholecystectomy.     The pathophysiology of gallbladder disease and complications of cholecystitis and choledocholithiasis were discussed with the patient. The risks associated with the procedure including, but not limited to, infection, bleeding, bile leak, bile duct injury, retained gallstones, drain placement, conversion to open and anesthesia complications were discussed with the patient.  Possible complications requiring further surgical intervention during or after the procedure were also discussed. The patient indicated understanding of the discussion, asked appropriate questions, and wishes to proceed with surgery. Based on intra-op findings, possibly able to discharge home today vs observe overnight          Time spent with the patient, reviewing the EMR, reviewing laboratory and imaging studies, counseling, educating and coordinating care:  60 minutes.    Geo Tirado MD

## 2023-10-20 NOTE — INTERVAL H&P NOTE
"I have reviewed the surgical (or preoperative) H&P that is linked to this encounter, and examined the patient. There are no significant changes    Clinical Conditions Present on Arrival:  Clinically Significant Risk Factors Present on Admission         # Hyponatremia: Lowest Na = 135 mmol/L in last 30 days, will monitor as appropriate  # Hypercalcemia: Highest Ca = 10.2 mg/dL in last 2 days, will monitor as appropriate        # Drug Induced Platelet Defect: home medication list includes an antiplatelet medication  # Obesity: Estimated body mass index is 37.25 kg/m  as calculated from the following:    Height as of this encounter: 1.93 m (6' 4\").    Weight as of this encounter: 138.8 kg (306 lb).       "

## 2023-10-20 NOTE — PROGRESS NOTES
"[facility] [place]MISSION NOTE    Patient admitted to room 2304 at approximately 1434 via cart from surgery. Patient was accompanied by nurse.     Verbal SBAR report received from DEIDRE Cleary prior to patient arrival.     Patient trasferred to bed via self. Patient alert and oriented X 3. The patient is not having any pain.  . Admission vital signs: Blood pressure 122/53, pulse 67, temperature 97.7  F (36.5  C), temperature source Oral, resp. rate 23, height 1.93 m (6' 4\"), weight 138.8 kg (306 lb), SpO2 94%. Patient was oriented to plan of care, call light, bed controls, tv, telephone, bathroom, and visiting hours.     Risk Assessment    The following safety risks were identified during admission: fall. Yellow risk band applied: YES.     Skin Initial Assessment    Patient declined full skin assessment. Focused skin assessment to RODNEY Mark Risk Assessment  Informed Refusal Interventions: Yes  Intervention Refused: Skin assessment    Education    Patient has a [place] to Observation order: Yes  Observation education completed and documented: Yes      Corry Dodd RN      "

## 2023-10-20 NOTE — BRIEF OP NOTE
[facility]    Brief Operative Note    Pre-operative diagnosis: Acute cholecystitis [K81.0]  Post-operative diagnosis Acute on chronic cholecystitis    Procedure: CHOLECYSTECTOMY, ROBOT-ASSISTED, LAPAROSCOPIC, USING DA NAMRATA XI, N/A - Abdomen    Surgeon: Surgeon(s) and Role:     * Geo Tirado MD - Primary  Anesthesia: General   Estimated Blood Loss: 30 ml    Drains: None  Specimens:   ID Type Source Tests Collected by Time Destination   1 : gall bladder Tissue Gallbladder SURGICAL PATHOLOGY EXAM Geo Tirado MD 10/20/2023 11:18 AM      Findings:   Acute on chronic cholecystitis. Friable gallbladder wall, spillage of bile. Critical view obtained and cholecystectomy performed without complication.  Complications: None.  Implants: * No implants in log *

## 2023-10-20 NOTE — ANESTHESIA PREPROCEDURE EVALUATION
Anesthesia Pre-Procedure Evaluation    Patient: Jorge Alberto Frye   MRN: [id number] : 1950        Procedure : Procedure(s):  CHOLECYSTECTOMY, ROBOT-ASSISTED, LAPAROSCOPIC, USING DA NAMRATA XI          Past Medical History:   Diagnosis Date     Acute pancreatitis 2006    Overview:  3/06     had negative w/u for etiology. ? cause    gb eval negative       Arthritis      Hypertension      Impotence      Spermatocele     left      Past Surgical History:   Procedure Laterality Date     ARTHROPLASTY KNEE Left 2017    Procedure: ARTHROPLASTY KNEE;  Left Total Knee Arthroplasty;  Surgeon: Jorge Alberto Jurado MD;  Location: [facility]     ARTHROPLASTY KNEE Right 2023    Procedure: Right Total Knee Arthroplasty;  Surgeon: Jorge Alberto Jurado MD;  Location: [facility]     ARTHROPLASTY SHOULDER Right 2016     ARTHROPLASTY SHOULDER Right 2016    Procedure: ARTHROPLASTY SHOULDER;  Surgeon: Jorge Alberto Jurado MD;  Location: [facility]     ARTHROPLASTY SHOULDER Left 2018    Procedure: ARTHROPLASTY SHOULDER;  Left total shoulder arthroplasty, possible reverse;  Surgeon: Jorge Alberto Jurado MD;  Location: [facility]     HERNIA REPAIR       TONSILLECTOMY & ADENOIDECTOMY       UPPER GI ENDOSCOPY      gastritis      Allergies   Allergen Reactions     Spironolactone Other (See Comments) and Swelling      Social History     Tobacco Use     Smoking status: Former     Types: Cigarettes     Quit date: 1984     Years since quittin.8     Smokeless tobacco: Never   Substance Use Topics     Alcohol use: Yes     Alcohol/week: 0.0 standard drinks of alcohol      Wt Readings from Last 1 Encounters:   10/19/23 138.8 kg (306 lb)        Anesthesia Evaluation   Pt has had prior anesthetic. Type: General, MAC and Regional.        ROS/MED HX  ENT/Pulmonary:     (+)     LOUIE risk factors,                asthma                  Neurologic:  - neg neurologic ROS     Cardiovascular:     (+) Dyslipidemia hypertension- -  CAD -  - -                                  Previous cardiac testing   Echo: Date: Results:    Stress Test:  Date: Results:    ECG Reviewed:  Date: 10/19/23 Results:  Sinus Rhythm -occasional PAC    # PACs = 1.  WITHIN NORMAL LIMITS  Cath:  Date: Results:      METS/Exercise Tolerance:     Hematologic:       Musculoskeletal: Comment: DJD    (+)  arthritis,             GI/Hepatic:     (+) GERD,         cholecystitis/cholelithiasis,          Renal/Genitourinary:     (+) renal disease,             Endo:     (+)               Obesity,       Psychiatric/Substance Use:  - neg psychiatric ROS     Infectious Disease:  - neg infectious disease ROS     Malignancy:  - neg malignancy ROS     Other:  - neg other ROS          Physical Exam    Airway        Mallampati: II   TM distance: > 3 FB   Neck ROM: full   Mouth opening: > 3 cm    Respiratory Devices and Support         Dental           Cardiovascular   cardiovascular exam normal          Pulmonary   pulmonary exam normal            OUTSIDE LABS:  CBC:   Lab Results   Component Value Date    WBC 16.5 (H) 10/20/2023    WBC 17.4 (H) 10/19/2023    HGB 13.9 10/20/2023    HGB 16.2 10/19/2023    HCT 41.4 10/20/2023    HCT 47.8 10/19/2023     10/20/2023     10/19/2023     BMP:   Lab Results   Component Value Date     10/20/2023     10/19/2023    POTASSIUM 3.8 10/20/2023    POTASSIUM 4.2 10/19/2023    CHLORIDE 96 (L) 10/20/2023    CHLORIDE 95 (L) 10/19/2023    CO2 25 10/20/2023    CO2 25 10/19/2023    BUN 18.7 10/20/2023    BUN 16.4 10/19/2023    CR 1.15 10/20/2023    CR 1.04 10/19/2023     (H) 10/20/2023     (H) 10/19/2023     COAGS:   Lab Results   Component Value Date    INR 1.04 05/31/2017     POC:   Lab Results   Component Value Date     (H) 03/01/2018     HEPATIC:   Lab Results   Component Value Date    ALBUMIN 3.9 10/20/2023    PROTTOTAL 6.2 (L) 10/20/2023    ALT 43 10/20/2023    AST 45 10/20/2023    ALKPHOS 55 10/20/2023    BILITOTAL 0.5  10/20/2023     OTHER:   Lab Results   Component Value Date    ALEXANDER 8.8 10/20/2023    LIPASE 21 10/20/2023    .0 (H) 06/05/2017    SED 20 06/02/2017       Anesthesia Plan    ASA Status:  3       Anesthesia Type: General.     - Airway: ETT   Induction: Propofol.   Maintenance: Balanced.        Consents    Anesthesia Plan(s) and associated risks, benefits, and realistic alternatives discussed. Questions answered and patient/representative(s) expressed understanding.     - Discussed: Risks, Benefits and Alternatives for BOTH SEDATION and the PROCEDURE were discussed     - Discussed with:  Patient            Postoperative Care    Pain management: IV analgesics, Oral pain medications, Multi-modal analgesia.   PONV prophylaxis: Ondansetron (or other 5HT-3), Dexamethasone or Solumedrol     Comments:              MAME Oviedo CRNA

## 2023-10-20 NOTE — ANESTHESIA POSTPROCEDURE EVALUATION
Patient: Jorge Alberto Frye    Procedure: Procedure(s):  CHOLECYSTECTOMY, ROBOT-ASSISTED, LAPAROSCOPIC, USING DA NAMRATA XI       Anesthesia Type:  General    Note:  Disposition: Admission   Postop Pain Control: Uneventful            Sign Out: Well controlled pain   PONV: No   Neuro/Psych: Uneventful            Sign Out: Acceptable/Baseline neuro status   Airway/Respiratory: Uneventful            Sign Out: Acceptable/Baseline resp. status   CV/Hemodynamics: Uneventful            Sign Out: Acceptable CV status; No obvious hypovolemia; No obvious fluid overload   Other NRE: NONE   DID A NON-ROUTINE EVENT OCCUR? No           Last vitals:  Vitals Value Taken Time   /68 10/20/23 1415   Temp     Pulse 75 10/20/23 1424   Resp 18 10/20/23 1424   SpO2 94 % 10/20/23 1424   Vitals shown include unfiled device data.    Electronically Signed By: MAME Oviedo CRNA  October 20, 2023  2:34 PM

## 2023-10-20 NOTE — ANESTHESIA CARE TRANSFER NOTE
Patient: Jorge Alberto Frye    Procedure: Procedure(s):  CHOLECYSTECTOMY, ROBOT-ASSISTED, LAPAROSCOPIC, USING DA NAMRATA XI       Diagnosis: Acute cholecystitis [K81.0]  Diagnosis Additional Information: No value filed.    Anesthesia Type:   General     Note:    Oropharynx: oropharynx clear of all foreign objects  Level of Consciousness: drowsy  Oxygen Supplementation: face mask    Independent Airway: airway patency satisfactory and stable  Dentition: dentition unchanged  Vital Signs Stable: post-procedure vital signs reviewed and stable  Report to RN Given: handoff report given  Patient transferred to: PACU    Handoff Report: Identifed the Patient, Identified the Reponsible Provider, Reviewed the pertinent medical history, Discussed the surgical course, Reviewed Intra-OP anesthesia mangement and issues during anesthesia, Set expectations for post-procedure period and Allowed opportunity for questions and acknowledgement of understanding      Vitals:  Vitals Value Taken Time   BP     Temp     Pulse     Resp     SpO2         Electronically Signed By: MAME Oviedo CRNA  October 20, 2023  1:42 PM

## 2023-10-20 NOTE — DISCHARGE SUMMARY
"Surgery Discharge Summary    Jorge Alberto Frye MRN# [id number]   YOB: 1950 Age: 73 year old     Date of Admission:  10/19/2023  Date of Discharge:  10/22/2023 11:20 AM   Admitting Physician:  Geo Tirado MD  Discharging Service:  [facility] General Surgery   Primary Provider: Rich Lloyd    Principle Diagnosis:  Acute cholecystitis [K81.0]    Secondary Diagnosis:  Same    Procedures:   Robotic-assisted cholecystectomy 10/20/23     Brief HPI: Jorge Alberto Frye is a 73 year old year-old male who presented to [facility] for evaluation of RUQ abdominal pain. He underwent the procedure listed above.     Hospital Course: Jorge Alberto Frye underwent the surgery without complications and was admitted for monitoring and pain control.  Diet was advanced in a step-wise fashion and was tolerated without nausea or emesis.  Pain control was achieved with a combination of IV and PO medications.  Early ambulation was encouraged.  His hospital course was significant for a slight RITA which resolved with IV fluids. His course was otherwise uncomplicated and he recovered as anticipated.     On day of discharge, he was tolerating an oral diet, had good pain control on oral medications, was ambulating independently and remained afebrile.  He will follow-up with the surgery clinic in two weeks and was advised to call with any questions or concerns.     Inpatient Consultations: Consultation during this admission received from internal medicine.  No medical intervention was indicated.      Labs/Imaging:   No results found for this or any previous visit (from the past 24 hour(s)).      Disposition:   Discharged to home     Discharge Condition  Discharge condition: Stable   Discharge vitals: Blood pressure (!) 159/75, pulse 76, temperature 98  F (36.7  C), temperature source Oral, resp. rate 18, height 1.93 m (6' 4\"), weight 141.2 kg (311 lb 4.6 oz), SpO2 97%.     Discharge Medications:   Discharge Medication List " as of 10/22/2023 10:51 AM        START taking these medications    Details   ciprofloxacin (CIPRO) 500 MG tablet Take 1 tablet (500 mg) by mouth 2 times daily for 4 days Take twice daily, last dose should be on 10/24, Disp-8 tablet, R-0, E-Prescribe      ibuprofen (ADVIL/MOTRIN) 200 MG tablet Take 1 tablet (200 mg) by mouth every 6 hours as needed for other (mild pain), Disp-30 tablet, R-0, E-Prescribe      metroNIDAZOLE (FLAGYL) 500 MG tablet Take 1 tablet (500 mg) by mouth 2 times daily for 4 days Take twice daily, last dose on 10/24, Disp-8 tablet, R-0, E-Prescribe           CONTINUE these medications which have CHANGED    Details   oxyCODONE (ROXICODONE) 5 MG tablet Take 1-2 tablets (5-10 mg) by mouth every 3 hours as needed (Moderate to Severe Pain), Disp-30 tablet, R-0, E-Prescribe      rosuvastatin (CRESTOR) 10 MG tablet Take 1 tablet (10 mg) by mouth daily, No Print OutHold off on taking this until you have repeat liver tests with your PCP      senna-docusate (SENOKOT-S/PERICOLACE) 8.6-50 MG tablet Take 1-2 tablets by mouth 2 times daily Take your prescribed stool softeners twice daily while taking narcotic medication. Stop if you develop diarrhea. The goal should be to have 1 bowel movement daily, Disp-30 tablet, R-0, E-PrescribeWhile on narcotic s           CONTINUE these medications which have NOT CHANGED    Details   albuterol (2.5 MG/3ML) 0.083% neb solution Inhale 2.5 mg into the lungs every 4 hours as needed, Historical      albuterol (PROAIR HFA, PROVENTIL HFA, VENTOLIN HFA) 108 (90 BASE) MCG/ACT inhaler Inhale 1-2 puffs into the lungs every 4 hours as needed , Historical      amLODIPine (NORVASC) 10 MG tablet Take 1 tablet by mouth daily, Historical      aspirin (ASA) 325 MG EC tablet Take 1 tablet (325 mg) by mouth daily, Disp-42 tablet, R-0, No Print Out      fluticasone-salmeterol (AIRDUO RESPICLICK) 232-14 MCG/ACT inhaler Inhale 1 puff into the lungs 2 times daily, Historical      furosemide  (LASIX) 20 MG tablet TAKE ONE-HALF TABLET BY  MOUTH IN THE MORNING, Historical      garlic 150 MG TABS Take 150 mg by mouth daily, Historical      lisinopril-hydrochlorothiazide (PRINZIDE,ZESTORETIC) 20-25 MG per tablet Take 1 tablet by mouth 2 times daily, Historical      methocarbamol (ROBAXIN) 500 MG tablet Take 1 tablet (500 mg) by mouth 4 times daily as needed for other (pain), Disp-60 tablet, R-1, E-Prescribe      metoprolol succinate ER (TOPROL XL) 100 MG 24 hr tablet Take 1 tablet by mouth daily, Historical      MULTIPLE VITAMINS PO Take 1 tablet by mouth daily , Historical      Omega-3 Fatty Acids (OMEGA-3 FISH OIL PO) Take 2 g by mouth daily , Historical      Omeprazole Magnesium (PRILOSEC OTC PO) Take 20 mg by mouth daily , Historical      tadalafil (CIALIS) 5 MG tablet Take 1 tablet by mouth daily as needed for erectile dysfunction Take 30 minutes before sexual activity, Historical      VITAMIN D, CHOLECALCIFEROL, PO Take 5,000 Units by mouth daily, Historical           STOP taking these medications       acetaminophen (TYLENOL) 325 MG tablet Comments:   Reason for Stopping:               Discharge Instructions:   Follow-up Appointments     Follow-up and recommended labs and tests       Follow up with me,  Geo Tirado MD, within 2 weeks to evaluate after   surgery.  No follow up labs or test are needed.          After Care Instructions       Activity      Your activity upon discharge: no heavy lifting for 2 weeks (no more than 20 lbs at once)        Diet      Follow this diet upon discharge: Cardiac (low fat, low salt)        Discharge Instructions      Please hold off on resuming your lasix until 10/23. You should hold off on taking your crestor until you have repeat labs with your PCP to recheck your liver function. Please avoid taking tylenol or drinking alcohol until your liver tests have normalized.        Discharge Instructions - diet      Resume pre procedure diet.        Do not immerse  incision in water       until the sutures are removed.        Ice to affected area      Ice to operative site, as needed.        NO ALCOHOL       For 24 hours post procedure, do not consume alcohol while taking narcotic medication        NO driving or operating machinery       until the day after the procedure. No operating heavy machinery while taking narcotics        NO lifting      NO lifting over  20  lbs and no strenuous physical activity for  2  weeks.        Shower      No shower for 24 hours post procedure. May shower on post-op day  1                    Geo Tirado MD   7:55 AM 10/23/23

## 2023-10-21 LAB
ALBUMIN SERPL BCG-MCNC: 3.5 G/DL (ref 3.5–5.2)
ALP SERPL-CCNC: 52 U/L (ref 40–129)
ALT SERPL W P-5'-P-CCNC: 64 U/L (ref 0–70)
ANION GAP SERPL CALCULATED.3IONS-SCNC: 16 MMOL/L (ref 7–15)
AST SERPL W P-5'-P-CCNC: 78 U/L (ref 0–45)
BILIRUB SERPL-MCNC: 0.3 MG/DL
BUN SERPL-MCNC: 26 MG/DL (ref 8–23)
CALCIUM SERPL-MCNC: 8.5 MG/DL (ref 8.8–10.2)
CHLORIDE SERPL-SCNC: 98 MMOL/L (ref 98–107)
CREAT SERPL-MCNC: 1.35 MG/DL (ref 0.67–1.17)
DEPRECATED HCO3 PLAS-SCNC: 21 MMOL/L (ref 22–29)
EGFRCR SERPLBLD CKD-EPI 2021: 55 ML/MIN/1.73M2
ERYTHROCYTE [DISTWIDTH] IN BLOOD BY AUTOMATED COUNT: 14.8 % (ref 10–15)
GLUCOSE SERPL-MCNC: 139 MG/DL (ref 70–99)
HCT VFR BLD AUTO: 38.3 % (ref 40–53)
HGB BLD-MCNC: 12.7 G/DL (ref 13.3–17.7)
MCH RBC QN AUTO: 28.9 PG (ref 26.5–33)
MCHC RBC AUTO-ENTMCNC: 33.2 G/DL (ref 31.5–36.5)
MCV RBC AUTO: 87 FL (ref 78–100)
PLATELET # BLD AUTO: 175 10E3/UL (ref 150–450)
POTASSIUM SERPL-SCNC: 4 MMOL/L (ref 3.4–5.3)
PROT SERPL-MCNC: 5.9 G/DL (ref 6.4–8.3)
RBC # BLD AUTO: 4.39 10E6/UL (ref 4.4–5.9)
SODIUM SERPL-SCNC: 135 MMOL/L (ref 135–145)
WBC # BLD AUTO: 14.7 10E3/UL (ref 4–11)

## 2023-10-21 PROCEDURE — 250N000013 HC RX MED GY IP 250 OP 250 PS 637: Performed by: EMERGENCY MEDICINE

## 2023-10-21 PROCEDURE — 258N000003 HC RX IP 258 OP 636: Performed by: HOSPITALIST

## 2023-10-21 PROCEDURE — 80053 COMPREHEN METABOLIC PANEL: CPT | Performed by: STUDENT IN AN ORGANIZED HEALTH CARE EDUCATION/TRAINING PROGRAM

## 2023-10-21 PROCEDURE — 250N000011 HC RX IP 250 OP 636: Mod: JZ | Performed by: STUDENT IN AN ORGANIZED HEALTH CARE EDUCATION/TRAINING PROGRAM

## 2023-10-21 PROCEDURE — 250N000013 HC RX MED GY IP 250 OP 250 PS 637: Performed by: STUDENT IN AN ORGANIZED HEALTH CARE EDUCATION/TRAINING PROGRAM

## 2023-10-21 PROCEDURE — 36415 COLL VENOUS BLD VENIPUNCTURE: CPT | Performed by: STUDENT IN AN ORGANIZED HEALTH CARE EDUCATION/TRAINING PROGRAM

## 2023-10-21 PROCEDURE — 99222 1ST HOSP IP/OBS MODERATE 55: CPT | Performed by: HOSPITALIST

## 2023-10-21 PROCEDURE — 96376 TX/PRO/DX INJ SAME DRUG ADON: CPT

## 2023-10-21 PROCEDURE — 96372 THER/PROPH/DIAG INJ SC/IM: CPT | Performed by: STUDENT IN AN ORGANIZED HEALTH CARE EDUCATION/TRAINING PROGRAM

## 2023-10-21 PROCEDURE — 85027 COMPLETE CBC AUTOMATED: CPT | Performed by: STUDENT IN AN ORGANIZED HEALTH CARE EDUCATION/TRAINING PROGRAM

## 2023-10-21 PROCEDURE — G0378 HOSPITAL OBSERVATION PER HR: HCPCS

## 2023-10-21 RX ORDER — SODIUM CHLORIDE 9 MG/ML
INJECTION, SOLUTION INTRAVENOUS CONTINUOUS
Status: DISCONTINUED | OUTPATIENT
Start: 2023-10-21 | End: 2023-10-22

## 2023-10-21 RX ADMIN — ACETAMINOPHEN 650 MG: 325 TABLET, FILM COATED ORAL at 00:05

## 2023-10-21 RX ADMIN — METRONIDAZOLE 500 MG: 500 INJECTION, SOLUTION INTRAVENOUS at 02:24

## 2023-10-21 RX ADMIN — SENNOSIDES 8.6 MG: 8.6 TABLET, FILM COATED ORAL at 07:30

## 2023-10-21 RX ADMIN — FLUTICASONE PROPIONATE AND SALMETEROL 1 PUFF: 232; 14 POWDER, METERED RESPIRATORY (INHALATION) at 20:17

## 2023-10-21 RX ADMIN — AMLODIPINE BESYLATE 10 MG: 10 TABLET ORAL at 07:32

## 2023-10-21 RX ADMIN — ROSUVASTATIN CALCIUM 10 MG: 5 TABLET, FILM COATED ORAL at 07:29

## 2023-10-21 RX ADMIN — PANTOPRAZOLE SODIUM 40 MG: 40 TABLET, DELAYED RELEASE ORAL at 07:29

## 2023-10-21 RX ADMIN — ACETAMINOPHEN 650 MG: 325 TABLET, FILM COATED ORAL at 16:56

## 2023-10-21 RX ADMIN — CIPROFLOXACIN 400 MG: 2 INJECTION, SOLUTION INTRAVENOUS at 12:23

## 2023-10-21 RX ADMIN — SODIUM CHLORIDE: 9 INJECTION, SOLUTION INTRAVENOUS at 09:41

## 2023-10-21 RX ADMIN — FLUTICASONE PROPIONATE AND SALMETEROL 1 PUFF: 232; 14 POWDER, METERED RESPIRATORY (INHALATION) at 07:28

## 2023-10-21 RX ADMIN — METRONIDAZOLE 500 MG: 500 INJECTION, SOLUTION INTRAVENOUS at 14:13

## 2023-10-21 RX ADMIN — ACETAMINOPHEN 650 MG: 325 TABLET, FILM COATED ORAL at 11:21

## 2023-10-21 RX ADMIN — CIPROFLOXACIN 400 MG: 2 INJECTION, SOLUTION INTRAVENOUS at 00:05

## 2023-10-21 RX ADMIN — SENNOSIDES 8.6 MG: 8.6 TABLET, FILM COATED ORAL at 20:17

## 2023-10-21 RX ADMIN — ENOXAPARIN SODIUM 40 MG: 100 INJECTION SUBCUTANEOUS at 07:46

## 2023-10-21 ASSESSMENT — ACTIVITIES OF DAILY LIVING (ADL)
ADLS_ACUITY_SCORE: 22
ADLS_ACUITY_SCORE: 20
ADLS_ACUITY_SCORE: 22

## 2023-10-21 NOTE — PLAN OF CARE
Problem: Adult Inpatient Plan of Care  Goal: Plan of Care Review  Description: The Plan of Care Review/Shift note should be completed every shift.  The Outcome Evaluation is a brief statement about your assessment that the patient is improving, declining, or no change.  This information will be displayed automatically on your shift  note.  Outcome: Progressing    Patient has ambulated and voided since surgery, has also had good pain coverage with scheduled tylenol and one PRN dose of Ibuprophen administered.    Martha Norman RN on 10/21/2023 at 5:10 AM

## 2023-10-21 NOTE — CONSULTS
Consult:   Service Date: 10/21/2023  Admit date / time: 10/19/2023  6:01 PM  [facility]    Problems:     Principal Problem:    Acute cholecystitis      Assessment and Plan:     A 74 yo male with a h/o HTN, HLD, GERD, prior pancreatitis, and asthma/COPD who presented with abdominal pain with nausea on 10/20, found to have acute cholecystitis, now s/p laparoscopic cholecystectomy. Hospital medicine was consulted to assist with medical management.    Acute on chronic cholecystitis s/p laparoscopic cholecystectomy on 10/20: CT a/p with acute cholecystitis. Afebrile and leukocytosis is improving.   -Mgt per surgery  -On cipro/flagyl  -Cont scheduled tylenol, prn oxycodone  -F/u GB pathology    Mild RITA on suspected CKD stage II: Likely pre-renal.   -Hold lasix, lisinopril/hydrochlorothiazide, ibuprofen  -IVF  -Trend cr      AG metabolic acidosis: Likely 2/2 RITA.  -IVF  -Trend BMP with repeat in AM    Acute anemia: Likely 2/2 combination of acute illness and acute blood loss from surgical intervention. H/H remains well above transfusion threshold.   -Trend H/H    HTN: BP currently running on the lower side with SBP in the 110's.  -Holding lasix, lisinopril/hydrochlorothiazide, amlodipine  -Cont toprol with holding parameters (held by nursing this AM)  -On IVF as above  -Monitor BP    Asthma/COPD: Stable.  -Cont current inhalers/prn nebs    GERD:  -Cont PPI    HLD:  -Cont statin    Code: Full    Thank you for this consult. Hospital medicine will continue following along with you.      Chief Complaint:     Abdominal pain    History of Present Illness:     This is a 74 yo male with a h/o HTN, HLD, GERD, prior pancreatitis, and asthma/COPD who presented with abdominal pain with nausea on 10/20, found to have acute cholecystitis, now s/p laparoscopic cholecystectomy. Hospital medicine was consulted to assist with medical management. Patient currently reports feeling okay apart from some mild  RUQ/epigastric abdominal discomfort. He denies CP, SOB, cough, N/V/D, urinary symptoms, chills or diaphoresis. Patient expresses concern over his labs that he reviewed on My Chart and his lower than normal BP.    Past Medical History:      Past Medical History:   Diagnosis Date    Acute pancreatitis 12/19/2006    Overview:  3/06     had negative w/u for etiology. ? cause    gb eval negative      Arthritis     Hypertension     Impotence     Spermatocele     left       Past Surgical History:     Past Surgical History:   Procedure Laterality Date    ARTHROPLASTY KNEE Left 5/31/2017    Procedure: ARTHROPLASTY KNEE;  Left Total Knee Arthroplasty;  Surgeon: Jorge Alberto Jurado MD;  Location: [facility] OR    ARTHROPLASTY KNEE Right 6/21/2023    Procedure: Right Total Knee Arthroplasty;  Surgeon: Jorge Alberto Jurado MD;  Location: [facility]    ARTHROPLASTY SHOULDER Right 11/17/2016    ARTHROPLASTY SHOULDER Right 11/17/2016    Procedure: ARTHROPLASTY SHOULDER;  Surgeon: Jorge Alberto Jurado MD;  Location: [facility] OR    ARTHROPLASTY SHOULDER Left 2/28/2018    Procedure: ARTHROPLASTY SHOULDER;  Left total shoulder arthroplasty, possible reverse;  Surgeon: Jorge Alberto Jurado MD;  Location: [facility] OR    HERNIA REPAIR      LAPAROSCOPIC CHOLECYSTECTOMY N/A 10/20/2023    Procedure: CHOLECYSTECTOMY, ROBOT-ASSISTED, LAPAROSCOPIC, USING DA NAMRATA XI;  Surgeon: Geo Tirado MD;  Location: [facility] OR    TONSILLECTOMY & ADENOIDECTOMY      UPPER GI ENDOSCOPY      gastritis       Medications prior to admission:     Current Outpatient Medications   Medication Sig Dispense Refill    acetaminophen (TYLENOL) 325 MG tablet Take 2 tablets (650 mg) by mouth every 4 hours as needed for other (mild pain) 30 tablet 0    ciprofloxacin (CIPRO) 500 MG tablet Take 1 tablet (500 mg) by mouth 2 times daily for 4 days Take twice daily, last dose should be on 10/24 8 tablet 0    ibuprofen (ADVIL/MOTRIN) 200 MG tablet Take 1 tablet (200 mg) by mouth every 6 hours as needed for other  (mild pain) 30 tablet 0    metroNIDAZOLE (FLAGYL) 500 MG tablet Take 1 tablet (500 mg) by mouth 2 times daily for 4 days Take twice daily, last dose on 10/24 8 tablet 0    oxyCODONE (ROXICODONE) 5 MG tablet Take 1-2 tablets (5-10 mg) by mouth every 3 hours as needed (Moderate to Severe Pain) 30 tablet 0    senna-docusate (SENOKOT-S/PERICOLACE) 8.6-50 MG tablet Take 1-2 tablets by mouth 2 times daily Take your prescribed stool softeners twice daily while taking narcotic medication. Stop if you develop diarrhea. The goal should be to have 1 bowel movement daily 30 tablet 0       Allergies:     Spironolactone     Social History:     Social History     Tobacco Use    Smoking status: Former     Types: Cigarettes     Quit date: 1984     Years since quittin.8    Smokeless tobacco: Never   Substance Use Topics    Alcohol use: Yes     Alcohol/week: 0.0 standard drinks of alcohol       Family History:     Family History   Problem Relation Age of Onset    Lung Cancer Mother     Cervical Cancer Mother     Prostate Cancer Father     Lung Cancer Maternal Grandfather     Breast Cancer Sister        Review of Systems:                                                                                                                Negative except per HPI or as indicated below.    GENERAL: () fever, () chills, () diaphoresis, () weight change, () global fatigue  HEENT: () rhinorrhea, () epistaxis, () sore throat, () rhinitis, () tinnitus, () ear pain  CARDIOVASCULAR: () chest pain, () palpitations, () orthopnea, () paroxysmal nocturnal dyspnea  PULMONARY: () cough, () wheezing, () shortness of breath, () hemoptysis  GI: (x) abdominal pain, () nausea, () vomiting, () diarrhea, () hematemesis, () melena, () hematochezia, () constipation  : () nocturia, () dysuria, () urinary frequency, () hematuria, () incontinence  NEURO: () slurred speech, () acute confusion, () weakness, () gait instability, () seizure, () paresthesias, ()  "facial droop, () numbness, () tremor, () headache, () lightheaded  ENDOCRINE: () polyuria, () polydipsia, () polyphagia, () heat, cold intolerance  HEME: () easy bruisability, () history of bleeding  LYMPH: () swollen glands  SKIN: () rash, () wound, () urticaria  VASCULAR: () cold extremity, () loss of pulse  EXT: () edema, () pain   MSK: () back pain, () neck pain  PSYCH: () anxiety, () stress, () depression, () paranoia    Physical Exam:     /53 (BP Location: Left arm)   Pulse 67   Temp 98.5  F (36.9  C) (Oral)   Resp 18   Ht 1.93 m (6' 4\")   Wt 138.8 kg (306 lb)   SpO2 97%   BMI 37.25 kg/m    General: A+O X 3, cooperative, no apparent distress, obese  Head: Normocephalic, without obvious abnormality, atraumatic  Eyes: Conjunctivae/corneas clear   Neck: No JVD   Lungs: CTAB   Heart: RRR, S1, S2 normal, no rub/gallops, no murmur  Abdomen: Soft, normal bowel sounds, minimal RUQ TTP, no guarding/rebound  Extremities: No LE edema   Skin: No rash  Neurologic: Cranial Nerves II-XII grossly intact. No focal deficits.     Investigations:     I have reviewed all the laboratory and imaging data available.     Labs Ordered and Resulted from Time of ED Arrival to Time of ED Departure   COMPREHENSIVE METABOLIC PANEL - Abnormal       Result Value    Sodium 137      Potassium 4.2      Carbon Dioxide (CO2) 25      Anion Gap 17 (*)     Urea Nitrogen 16.4      Creatinine 1.04      GFR Estimate 75      Calcium 10.2      Chloride 95 (*)     Glucose 117 (*)     Alkaline Phosphatase 65      AST 37      ALT 39      Protein Total 8.0      Albumin 4.9      Bilirubin Total 0.5     CBC WITH PLATELETS AND DIFFERENTIAL - Abnormal    WBC Count 17.4 (*)     RBC Count 5.62      Hemoglobin 16.2      Hematocrit 47.8      MCV 85      MCH 28.8      MCHC 33.9      RDW 14.6      Platelet Count 233      % Neutrophils 87      % Lymphocytes 4      % Monocytes 9      Mids % (Monos, Eos, Basos)        % Eosinophils 0      % Basophils 0      % " Immature Granulocytes 0      NRBCs per 100 WBC 0      Absolute Neutrophils 15.1 (*)     Absolute Lymphocytes 0.6 (*)     Absolute Monocytes 1.6 (*)     Mids Abs (Monos, Eos, Basos)        Absolute Eosinophils 0.0      Absolute Basophils 0.0      Absolute Immature Granulocytes 0.1      Absolute NRBCs 0.0     COMPREHENSIVE METABOLIC PANEL - Abnormal    Sodium 135      Potassium 3.8      Carbon Dioxide (CO2) 25      Anion Gap 14      Urea Nitrogen 18.7      Creatinine 1.15      GFR Estimate 66      Calcium 8.8      Chloride 96 (*)     Glucose 143 (*)     Alkaline Phosphatase 55      AST 45      ALT 43      Protein Total 6.2 (*)     Albumin 3.9      Bilirubin Total 0.5     CBC WITH PLATELETS AND DIFFERENTIAL - Abnormal    WBC Count 16.5 (*)     RBC Count 4.84      Hemoglobin 13.9      Hematocrit 41.4      MCV 86      MCH 28.7      MCHC 33.6      RDW 14.9      Platelet Count 180      % Neutrophils 85      % Lymphocytes 4      % Monocytes 10      Mids % (Monos, Eos, Basos)        % Eosinophils 0      % Basophils 0      % Immature Granulocytes 1      NRBCs per 100 WBC 0      Absolute Neutrophils 14.2 (*)     Absolute Lymphocytes 0.6 (*)     Absolute Monocytes 1.7 (*)     Mids Abs (Monos, Eos, Basos)        Absolute Eosinophils 0.0      Absolute Basophils 0.0      Absolute Immature Granulocytes 0.1      Absolute NRBCs 0.0     ETHYL ALCOHOL LEVEL - Normal    Alcohol ethyl <0.01     LIPASE - Normal    Lipase 21     LIPASE - Normal    Lipase 21         Sarah Craft MD  10/21/2023 4:45 PM

## 2023-10-21 NOTE — PLAN OF CARE
Patient is alert and orientated x4. Up IND in room. Lap sites are intact and no drainage noted. Patient has very limited discomfort to ABD where lap sites are and has not requested anything further than scheduled Tylenol. Bowel sounds active and patient reports passing flatus. Blood pressures have been lower today and only Amlodipine was administered this AM. Patient is to stay overnight to monitor blood pressures and to have repeat labs.

## 2023-10-21 NOTE — PROGRESS NOTES
"General Surgery    S: Feeling okay.  Having some mild discomfort as expected.  Vital signs stable but some relative low blood pressures to what he is used to.  Mild elevation of creatinine today.  Tolerating diet.    O:  Vital signs:  Temp: 98.5  F (36.9  C) Temp src: Oral BP: 115/53 Pulse: 67   Resp: 18 SpO2: 97 % O2 Device: None (Room air) Oxygen Delivery: 2 LPM Height: 193 cm (6' 4\") Weight: 138.8 kg (306 lb)  Estimated body mass index is 37.25 kg/m  as calculated from the following:    Height as of this encounter: 1.93 m (6' 4\").    Weight as of this encounter: 138.8 kg (306 lb).      Gen: AAOx3, NAD  Heart: RRR  Lungs: CTA  Abd: Soft, minimal distention.  Expected mild tenderness palpation, incisions clean dry and intact    Labs/Imaging  Results for orders placed or performed during the hospital encounter of 10/19/23 (from the past 24 hour(s))   Comprehensive metabolic panel   Result Value Ref Range    Sodium 135 135 - 145 mmol/L    Potassium 4.0 3.4 - 5.3 mmol/L    Carbon Dioxide (CO2) 21 (L) 22 - 29 mmol/L    Anion Gap 16 (H) 7 - 15 mmol/L    Urea Nitrogen 26.0 (H) 8.0 - 23.0 mg/dL    Creatinine 1.35 (H) 0.67 - 1.17 mg/dL    GFR Estimate 55 (L) >60 mL/min/1.73m2    Calcium 8.5 (L) 8.8 - 10.2 mg/dL    Chloride 98 98 - 107 mmol/L    Glucose 139 (H) 70 - 99 mg/dL    Alkaline Phosphatase 52 40 - 129 U/L    AST 78 (H) 0 - 45 U/L    ALT 64 0 - 70 U/L    Protein Total 5.9 (L) 6.4 - 8.3 g/dL    Albumin 3.5 3.5 - 5.2 g/dL    Bilirubin Total 0.3 <=1.2 mg/dL   CBC with platelets   Result Value Ref Range    WBC Count 14.7 (H) 4.0 - 11.0 10e3/uL    RBC Count 4.39 (L) 4.40 - 5.90 10e6/uL    Hemoglobin 12.7 (L) 13.3 - 17.7 g/dL    Hematocrit 38.3 (L) 40.0 - 53.0 %    MCV 87 78 - 100 fL    MCH 28.9 26.5 - 33.0 pg    MCHC 33.2 31.5 - 36.5 g/dL    RDW 14.8 10.0 - 15.0 %    Platelet Count 175 150 - 450 10e3/uL           A: Postop day #1 status post laparoscopic cholecystectomy for acute cholecystitis  Acute kidney injury and " mild postoperative anemia    P: Patient and spouse are fairly nervous to be discharged today with relative decreased blood pressure and lab abnormalities.  They would prefer to stay for repeat studies in the morning to ensure clinical improvement prior to discharge.  Repeat labs in the morning.  Advance diet as tolerated.  Continue antibiotics.  Anticipate discharge in the next 24 hours.    Dr Medina, DO FACS

## 2023-10-22 VITALS
WEIGHT: 311.29 LBS | BODY MASS INDEX: 37.91 KG/M2 | HEIGHT: 76 IN | HEART RATE: 76 BPM | RESPIRATION RATE: 18 BRPM | DIASTOLIC BLOOD PRESSURE: 75 MMHG | OXYGEN SATURATION: 97 % | TEMPERATURE: 98 F | SYSTOLIC BLOOD PRESSURE: 159 MMHG

## 2023-10-22 LAB
ALBUMIN SERPL BCG-MCNC: 3.5 G/DL (ref 3.5–5.2)
ALP SERPL-CCNC: 52 U/L (ref 40–129)
ALT SERPL W P-5'-P-CCNC: 118 U/L (ref 0–70)
ANION GAP SERPL CALCULATED.3IONS-SCNC: 10 MMOL/L (ref 7–15)
AST SERPL W P-5'-P-CCNC: 116 U/L (ref 0–45)
BILIRUB SERPL-MCNC: 0.2 MG/DL
BUN SERPL-MCNC: 22.6 MG/DL (ref 8–23)
CALCIUM SERPL-MCNC: 8.7 MG/DL (ref 8.8–10.2)
CHLORIDE SERPL-SCNC: 104 MMOL/L (ref 98–107)
CREAT SERPL-MCNC: 1.09 MG/DL (ref 0.67–1.17)
DEPRECATED HCO3 PLAS-SCNC: 26 MMOL/L (ref 22–29)
EGFRCR SERPLBLD CKD-EPI 2021: 71 ML/MIN/1.73M2
ERYTHROCYTE [DISTWIDTH] IN BLOOD BY AUTOMATED COUNT: 15 % (ref 10–15)
GLUCOSE SERPL-MCNC: 122 MG/DL (ref 70–99)
HCT VFR BLD AUTO: 38.7 % (ref 40–53)
HGB BLD-MCNC: 12.8 G/DL (ref 13.3–17.7)
MCH RBC QN AUTO: 29 PG (ref 26.5–33)
MCHC RBC AUTO-ENTMCNC: 33.1 G/DL (ref 31.5–36.5)
MCV RBC AUTO: 88 FL (ref 78–100)
PLATELET # BLD AUTO: 199 10E3/UL (ref 150–450)
POTASSIUM SERPL-SCNC: 4 MMOL/L (ref 3.4–5.3)
PROT SERPL-MCNC: 6.1 G/DL (ref 6.4–8.3)
RBC # BLD AUTO: 4.42 10E6/UL (ref 4.4–5.9)
SODIUM SERPL-SCNC: 140 MMOL/L (ref 135–145)
WBC # BLD AUTO: 10.6 10E3/UL (ref 4–11)

## 2023-10-22 PROCEDURE — 250N000011 HC RX IP 250 OP 636: Mod: JZ | Performed by: STUDENT IN AN ORGANIZED HEALTH CARE EDUCATION/TRAINING PROGRAM

## 2023-10-22 PROCEDURE — 80053 COMPREHEN METABOLIC PANEL: CPT | Performed by: HOSPITALIST

## 2023-10-22 PROCEDURE — 85027 COMPLETE CBC AUTOMATED: CPT | Performed by: HOSPITALIST

## 2023-10-22 PROCEDURE — 250N000013 HC RX MED GY IP 250 OP 250 PS 637: Performed by: EMERGENCY MEDICINE

## 2023-10-22 PROCEDURE — 96376 TX/PRO/DX INJ SAME DRUG ADON: CPT

## 2023-10-22 PROCEDURE — 258N000003 HC RX IP 258 OP 636: Performed by: HOSPITALIST

## 2023-10-22 PROCEDURE — G0378 HOSPITAL OBSERVATION PER HR: HCPCS

## 2023-10-22 PROCEDURE — 36415 COLL VENOUS BLD VENIPUNCTURE: CPT | Performed by: HOSPITALIST

## 2023-10-22 PROCEDURE — 99232 SBSQ HOSP IP/OBS MODERATE 35: CPT | Performed by: HOSPITALIST

## 2023-10-22 PROCEDURE — 250N000013 HC RX MED GY IP 250 OP 250 PS 637: Performed by: STUDENT IN AN ORGANIZED HEALTH CARE EDUCATION/TRAINING PROGRAM

## 2023-10-22 PROCEDURE — 250N000013 HC RX MED GY IP 250 OP 250 PS 637: Performed by: HOSPITALIST

## 2023-10-22 RX ORDER — ROSUVASTATIN CALCIUM 10 MG/1
10 TABLET, COATED ORAL DAILY
Start: 2023-10-22

## 2023-10-22 RX ADMIN — PANTOPRAZOLE SODIUM 40 MG: 40 TABLET, DELAYED RELEASE ORAL at 07:33

## 2023-10-22 RX ADMIN — METRONIDAZOLE 500 MG: 500 INJECTION, SOLUTION INTRAVENOUS at 02:25

## 2023-10-22 RX ADMIN — FLUTICASONE PROPIONATE AND SALMETEROL 1 PUFF: 232; 14 POWDER, METERED RESPIRATORY (INHALATION) at 07:35

## 2023-10-22 RX ADMIN — CIPROFLOXACIN 400 MG: 2 INJECTION, SOLUTION INTRAVENOUS at 00:45

## 2023-10-22 RX ADMIN — AMLODIPINE BESYLATE 10 MG: 10 TABLET ORAL at 07:33

## 2023-10-22 RX ADMIN — LISINOPRIL AND HYDROCHLOROTHIAZIDE TABLETS 1 TABLET: 20; 25 TABLET ORAL at 07:33

## 2023-10-22 RX ADMIN — SENNOSIDES 8.6 MG: 8.6 TABLET, FILM COATED ORAL at 07:33

## 2023-10-22 RX ADMIN — METOPROLOL SUCCINATE 100 MG: 100 TABLET, EXTENDED RELEASE ORAL at 07:33

## 2023-10-22 RX ADMIN — SODIUM CHLORIDE: 9 INJECTION, SOLUTION INTRAVENOUS at 02:25

## 2023-10-22 ASSESSMENT — ACTIVITIES OF DAILY LIVING (ADL)
ADLS_ACUITY_SCORE: 22

## 2023-10-22 NOTE — PLAN OF CARE
[facility] DISCHARGE NOTE    Patient discharged to home at 11:20 AM via wheel chair. Accompanied by spouse and staff. Discharge instructions reviewed with patient and spouse, opportunity offered to ask questions. Prescriptions sent to patients preferred pharmacy. All belongings sent with patient.    Corry Dodd RN

## 2023-10-22 NOTE — PROGRESS NOTES
Internal Medicine Progress Note     Service Date: 10/22/2023  [facility] - Admission Date: 10/19/2023     Problems:     Patient Active Problem List   Diagnosis    Status post total shoulder arthroplasty, right    Elevated prostate specific antigen (PSA)    Cyst of epididymis    Gastroesophageal reflux disease    Benign essential hypertension    Hyperlipidemia    Impotence of organic origin    Low back pain    Primary osteoarthritis of both knees    Encounter for screening for malignant neoplasm of colon    Stricture of esophagus    Asthma    Status post total left knee replacement    CAD (coronary artery disease)    RITA (acute kidney injury) (H24)    Status post total replacement of left shoulder    DJD of left shoulder    Right knee DJD    S/P total knee arthroplasty, right    Acute cholecystitis       Assessments and Plans:     A 72 yo male with a h/o HTN, HLD, GERD, prior pancreatitis, and asthma/COPD who presented with abdominal pain with nausea on 10/20, found to have acute cholecystitis, now s/p laparoscopic cholecystectomy. Hospital medicine was consulted to assist with medical management.      Acute on chronic cholecystitis s/p laparoscopic cholecystectomy on 10/20: CT a/p with acute cholecystitis. Afebrile and leukocytosis resolved. Transaminases were somewhat elevated (110's) on the day of discharge.   -Mgt per surgery  -On cipro/flagyl  -Cont prn oxycodone  -F/u GB pathology  -Patient was instructed to hold off on taking his crestor, tylenol and to avoid ETOH until he has repeat LFTs with his PCP at follow up     Mild RITA on suspected CKD stage II: Likely pre-renal. Cr now back to baseline with transiently holding lasix, lisinopril/hydrochlorothiazide and s/p IVF.   -RITA now resolved  -Stop IVF     AG metabolic acidosis: Likely 2/2 RITA and resolved with IVF.  -Resolved     Acute anemia: Likely 2/2 combination of acute illness and acute blood loss from surgical intervention. H/H  "remains well above transfusion threshold and was improving at discharge.      HTN: BP currently running on the lower side with SBP in the 110's. Patient's antihypertensives were transiently held and he was given IVF. Following these measures, his BP was elevated and his antihypertensives were resumed.   -Resume lisinopril/hydrochlorothiazide, amlodipine, toprol     Asthma/COPD: Stable.  -Cont current inhalers/prn nebs     GERD:  -Cont PPI     HLD:  -Cont statin     Code: Full     Thank you for this consult. Patient is cleared for discharge from IM perspective.       Subjective:      Patient reports feeling fine. States that he has mild RUQ discomfort, but no real pain reported this AM. No c/o CP, SOB, cough, N/V/D, urinary symptoms, chills or diaphoresis.     Objective:      Vitals: BP (!) 159/75 (BP Location: Right arm)   Pulse 76   Temp 98  F (36.7  C) (Oral)   Resp 18   Ht 1.93 m (6' 4\")   Wt 141.2 kg (311 lb 4.6 oz)   SpO2 97%   BMI 37.89 kg/m  Temp (24hrs), Av.9  F (36.6  C), Min:96.8  F (36  C), Max:98.5  F (36.9  C)    Gen: A+Ox3, no acute distress  Eyes: No scleral icterus  Neck: No JVD  ENT: MMM  Heart: RRR, clear S1S2, no rubs or gallops, no murmurs  Lungs: CTA all fields, no wheezes, rales or rhonchi  Abdomen: Normal bowel sounds, soft, minimal RUQ tenderness to palpation, no rebound or guarding  Extremities: No lower extremity edema  Skin: No rash  Neuro: Cranial nerves grossly intact, no focal deficits  Psych: Appropriate affect / mood    I have reviewed all labs, imaging and other investigations.     Labs/Imaging:     Results for orders placed or performed during the hospital encounter of 10/19/23 (from the past 24 hour(s))   CBC with platelets   Result Value Ref Range    WBC Count 10.6 4.0 - 11.0 10e3/uL    RBC Count 4.42 4.40 - 5.90 10e6/uL    Hemoglobin 12.8 (L) 13.3 - 17.7 g/dL    Hematocrit 38.7 (L) 40.0 - 53.0 %    MCV 88 78 - 100 fL    MCH 29.0 26.5 - 33.0 pg    MCHC 33.1 31.5 - 36.5 " g/dL    RDW 15.0 10.0 - 15.0 %    Platelet Count 199 150 - 450 10e3/uL   Comprehensive metabolic panel   Result Value Ref Range    Sodium 140 135 - 145 mmol/L    Potassium 4.0 3.4 - 5.3 mmol/L    Carbon Dioxide (CO2) 26 22 - 29 mmol/L    Anion Gap 10 7 - 15 mmol/L    Urea Nitrogen 22.6 8.0 - 23.0 mg/dL    Creatinine 1.09 0.67 - 1.17 mg/dL    GFR Estimate 71 >60 mL/min/1.73m2    Calcium 8.7 (L) 8.8 - 10.2 mg/dL    Chloride 104 98 - 107 mmol/L    Glucose 122 (H) 70 - 99 mg/dL    Alkaline Phosphatase 52 40 - 129 U/L     (H) 0 - 45 U/L     (H) 0 - 70 U/L    Protein Total 6.1 (L) 6.4 - 8.3 g/dL    Albumin 3.5 3.5 - 5.2 g/dL    Bilirubin Total 0.2 <=1.2 mg/dL         Sarah Craft MD  10/22/2023 10:44 AM

## 2023-10-22 NOTE — PLAN OF CARE
"Goal Outcome Evaluation:    Patients pain remains under control, BP has been improving throughout the shift and labs have come back with Creatinine improving back to 1.09    /58 (BP Location: Left arm)   Pulse 70   Temp 98  F (36.7  C) (Oral)   Resp 16   Ht 1.93 m (6' 4\")   Wt 141.2 kg (311 lb 4.6 oz)   SpO2 92%   BMI 37.89 kg/m        "

## 2023-10-22 NOTE — PROGRESS NOTES
"General Surgery    S: Doing well. Tolerating a diet. BP improved. Labs improved. No new concerns.    O:  Vital signs:  Temp: 98  F (36.7  C) Temp src: Oral BP: (!) 159/75 Pulse: 76   Resp: 18 SpO2: 97 % O2 Device: None (Room air) Oxygen Delivery: 2 LPM Height: 193 cm (6' 4\") Weight: 141.2 kg (311 lb 4.6 oz)  Estimated body mass index is 37.89 kg/m  as calculated from the following:    Height as of this encounter: 1.93 m (6' 4\").    Weight as of this encounter: 141.2 kg (311 lb 4.6 oz).      Gen: AAOx3, NAD  Heart: RRR  Lungs: CTA  Abd: Soft, minimal distention.  Expected mild tenderness palpation, incisions clean dry and intact    Labs/Imaging  Results for orders placed or performed during the hospital encounter of 10/19/23 (from the past 24 hour(s))   CBC with platelets   Result Value Ref Range    WBC Count 10.6 4.0 - 11.0 10e3/uL    RBC Count 4.42 4.40 - 5.90 10e6/uL    Hemoglobin 12.8 (L) 13.3 - 17.7 g/dL    Hematocrit 38.7 (L) 40.0 - 53.0 %    MCV 88 78 - 100 fL    MCH 29.0 26.5 - 33.0 pg    MCHC 33.1 31.5 - 36.5 g/dL    RDW 15.0 10.0 - 15.0 %    Platelet Count 199 150 - 450 10e3/uL   Comprehensive metabolic panel   Result Value Ref Range    Sodium 140 135 - 145 mmol/L    Potassium 4.0 3.4 - 5.3 mmol/L    Carbon Dioxide (CO2) 26 22 - 29 mmol/L    Anion Gap 10 7 - 15 mmol/L    Urea Nitrogen 22.6 8.0 - 23.0 mg/dL    Creatinine 1.09 0.67 - 1.17 mg/dL    GFR Estimate 71 >60 mL/min/1.73m2    Calcium 8.7 (L) 8.8 - 10.2 mg/dL    Chloride 104 98 - 107 mmol/L    Glucose 122 (H) 70 - 99 mg/dL    Alkaline Phosphatase 52 40 - 129 U/L     (H) 0 - 45 U/L     (H) 0 - 70 U/L    Protein Total 6.1 (L) 6.4 - 8.3 g/dL    Albumin 3.5 3.5 - 5.2 g/dL    Bilirubin Total 0.2 <=1.2 mg/dL           A: Postop day #2 status post laparoscopic cholecystectomy for acute cholecystitis  Acute kidney injury and mild postoperative anemia, improved and stable    P: OK for discharge today after seen by hospitalist to make any " medications adjustments necessary. F/up w/ Dr Tirado.    Dr Medina, DO FACS

## 2023-10-24 LAB
PATH REPORT.COMMENTS IMP SPEC: NORMAL
PATH REPORT.COMMENTS IMP SPEC: NORMAL
PATH REPORT.FINAL DX SPEC: NORMAL
PATH REPORT.GROSS SPEC: NORMAL
PATH REPORT.MICROSCOPIC SPEC OTHER STN: NORMAL
PATH REPORT.RELEVANT HX SPEC: NORMAL
PHOTO IMAGE: NORMAL

## 2023-10-30 ENCOUNTER — OFFICE VISIT (OUTPATIENT)
Dept: SURGERY | Facility: CLINIC | Age: 73
End: 2023-10-30
Payer: MEDICARE

## 2023-10-30 VITALS
WEIGHT: 299 LBS | BODY MASS INDEX: 36.41 KG/M2 | SYSTOLIC BLOOD PRESSURE: 154 MMHG | TEMPERATURE: 97.6 F | RESPIRATION RATE: 18 BRPM | HEART RATE: 84 BPM | DIASTOLIC BLOOD PRESSURE: 83 MMHG | OXYGEN SATURATION: 97 % | HEIGHT: 76 IN

## 2023-10-30 DIAGNOSIS — I10 BENIGN ESSENTIAL HYPERTENSION: Chronic | ICD-10-CM

## 2023-10-30 DIAGNOSIS — J45.909 ASTHMA, UNSPECIFIED ASTHMA SEVERITY, UNSPECIFIED WHETHER COMPLICATED, UNSPECIFIED WHETHER PERSISTENT: Chronic | ICD-10-CM

## 2023-10-30 DIAGNOSIS — Z90.49 S/P LAPAROSCOPIC CHOLECYSTECTOMY: Primary | ICD-10-CM

## 2023-10-30 DIAGNOSIS — E78.5 HYPERLIPIDEMIA, UNSPECIFIED HYPERLIPIDEMIA TYPE: Chronic | ICD-10-CM

## 2023-10-30 PROCEDURE — 99024 POSTOP FOLLOW-UP VISIT: CPT | Performed by: STUDENT IN AN ORGANIZED HEALTH CARE EDUCATION/TRAINING PROGRAM

## 2023-10-30 RX ORDER — ASPIRIN 81 MG/1
TABLET, CHEWABLE ORAL
COMMUNITY
Start: 1989-11-27

## 2023-10-30 NOTE — LETTER
"    10/30/2023         RE: Jorge Alberto Frye  [address] [place] [address]        Dear Colleague,    Thank you for referring your patient, Jorge Alberto Frye, to the [facility]. Please see a copy of my visit note below.    Surgery Post-op Note  [facility]  [address] [place] [address]  T: [phone]  F: [phone]    Subjective:     Jorge Alberto Frye presents to the clinic after undergoing robotic-assisted cholecystectomy on 10/20/23.  Patient is here for follow up. The patient reports no more incisional pain.  Patient is currently tolerating a regular diet.  Patient states bowel habits normal and soft. Patient denies significant nausea or vomiting.        Objective:     Vitals:   Pulse 84   Resp 18   Ht 1.93 m (6' 3.98\")   Wt 135.6 kg (299 lb)   SpO2 97%   BMI 36.41 kg/m     General Appearance:    Jorge Alberto is a well-appearing male who is in no acute distress.   Cardiac:    Regular rate, rhythm    Pulmonary:   Nonlabored breathing on room air   Abdomen:    Soft, nondistended, nontender   Incision: The incision sites are all healing well. There are no signs of cellulitis or drainage. Mild healing ecchymosis in left upper quadrant        Labs/Imaging:     CBC RESULTS:   Recent Labs   Lab Test 10/22/23  0430   WBC 10.6   RBC 4.42   HGB 12.8*   HCT 38.7*   MCV 88   MCH 29.0   MCHC 33.1   RDW 15.0        Last Comprehensive Metabolic Panel:  Lab Results   Component Value Date     10/22/2023    POTASSIUM 4.0 10/22/2023    CHLORIDE 104 10/22/2023    CO2 26 10/22/2023    ANIONGAP 10 10/22/2023     (H) 10/22/2023    BUN 22.6 10/22/2023    CR 1.09 10/22/2023    GFRESTIMATED 71 10/22/2023    ALEXANDER 8.7 (L) 10/22/2023              Pathology:     Final Diagnosis   A(1). Gallbladder, cholecystectomy:  -Acute cholecystitis  -Negative for dysplasia or malignancy.        Assessment:   Mr.Thomas LUPE Frye is status post robotic-assisted cholecystectomy, " now doing well .        Plan:     1. The patient is instructed to call the office if there is any increasing incisional pain, swelling, redness, drainage, or any other problems.   2. No further activity restrictions, ok to increase activity level gradually to normal level  3. Follow up as needed if further questions or concerns    Geo Tirado MD   10/30/2023 at 11:21 AM        Again, thank you for allowing me to participate in the care of your patient.        Sincerely,        Geo Tirado MD

## 2023-10-30 NOTE — PATIENT INSTRUCTIONS
1. The patient is instructed to call the office if there is any increasing incisional pain, swelling, redness, drainage, or any other problems.   2. No further activity restrictions, ok to increase activity level gradually to normal level  3. Follow up as needed if further questions or concerns

## 2023-10-30 NOTE — PROGRESS NOTES
"Surgery Post-op Note  [facility]  [address] [facility] [place] [address]  T: [phone]  F: [phone]    Subjective:     Jorge Alberto Frye presents to the clinic after undergoing robotic-assisted cholecystectomy on 10/20/23.  Patient is here for follow up. The patient reports no more incisional pain.  Patient is currently tolerating a regular diet.  Patient states bowel habits normal and soft. Patient denies significant nausea or vomiting.        Objective:     Vitals:   Pulse 84   Resp 18   Ht 1.93 m (6' 3.98\")   Wt 135.6 kg (299 lb)   SpO2 97%   BMI 36.41 kg/m     General Appearance:    Jorge Alberto is a well-appearing male who is in no acute distress.   Cardiac:    Regular rate, rhythm    Pulmonary:   Nonlabored breathing on room air   Abdomen:    Soft, nondistended, nontender   Incision: The incision sites are all healing well. There are no signs of cellulitis or drainage. Mild healing ecchymosis in left upper quadrant        Labs/Imaging:     CBC RESULTS:   Recent Labs   Lab Test 10/22/23  0430   WBC 10.6   RBC 4.42   HGB 12.8*   HCT 38.7*   MCV 88   MCH 29.0   MCHC 33.1   RDW 15.0        Last Comprehensive Metabolic Panel:  Lab Results   Component Value Date     10/22/2023    POTASSIUM 4.0 10/22/2023    CHLORIDE 104 10/22/2023    CO2 26 10/22/2023    ANIONGAP 10 10/22/2023     (H) 10/22/2023    BUN 22.6 10/22/2023    CR 1.09 10/22/2023    GFRESTIMATED 71 10/22/2023    ALEXANDER 8.7 (L) 10/22/2023              Pathology:     Final Diagnosis   A(1). Gallbladder, cholecystectomy:  -Acute cholecystitis  -Negative for dysplasia or malignancy.        Assessment:   Mr.Thomas LUPE Frye is status post robotic-assisted cholecystectomy, now doing well .        Plan:     1. The patient is instructed to call the office if there is any increasing incisional pain, swelling, redness, drainage, or any other problems.   2. No further activity restrictions, ok to increase activity level " gradually to normal level  3. Follow up as needed if further questions or concerns    Geo Tirado MD   10/30/2023 at 11:21 AM

## 2023-10-30 NOTE — NURSING NOTE
"Chief Complaint   Patient presents with    Hospital F/U     CHOLECYSTECTOMY, ROBOT-ASSISTED, LAPAROSCOPIC, USING [facility]       Vitals:    10/30/23 1113   Pulse: 84   Resp: 18   SpO2: 97%   Weight: 135.6 kg (299 lb)   Height: 1.93 m (6' 3.98\")     Wt Readings from Last 1 Encounters:   10/30/23 135.6 kg (299 lb)       Deena Bhatia MA      "

## 2024-03-24 ENCOUNTER — HEALTH MAINTENANCE LETTER (OUTPATIENT)
Age: 74
End: 2024-03-24

## 2024-07-07 NOTE — INTERVAL H&P NOTE
"I have reviewed the surgical (or preoperative) H&P that is linked to this encounter, and examined the patient. There are no significant changes    Clinical Conditions Present on Arrival:  Clinically Significant Risk Factors Present on Admission                 # Drug Induced Platelet Defect: home medication list includes an antiplatelet medication  # Obesity: Estimated body mass index is 38.75 kg/m  as calculated from the following:    Height as of this encounter: 1.905 m (6' 3\").    Weight as of this encounter: 140.6 kg (310 lb).       " Statement Selected Minocycline Counseling: Patient advised regarding possible photosensitivity and discoloration of the teeth, skin, lips, tongue and gums.  Patient instructed to avoid sunlight, if possible.  When exposed to sunlight, patients should wear protective clothing, sunglasses, and sunscreen.  The patient was instructed to call the office immediately if the following severe adverse effects occur:  hearing changes, easy bruising/bleeding, severe headache, or vision changes.  The patient verbalized understanding of the proper use and possible adverse effects of minocycline.  All of the patient's questions and concerns were addressed.

## 2025-04-13 ENCOUNTER — HEALTH MAINTENANCE LETTER (OUTPATIENT)
Age: 75
End: 2025-04-13

## (undated) DEVICE — DRAPE POUCH INSTRUMENT 3 POCKET 1018L

## (undated) DEVICE — ESU HOLSTER PLASTIC DISP E2400

## (undated) DEVICE — SYR 20ML SLIP TIP

## (undated) DEVICE — SUCTION IRR SYSTEM W/TIP INTERPULSE

## (undated) DEVICE — HOOD T4 PROTECTIVE STERI FACE SHIELD 400-800

## (undated) DEVICE — GOWN IMPERVIOUS SPECIALTY XLG/XLONG 32474

## (undated) DEVICE — STOCKING SLEEVE COMPRESSION CALF MED

## (undated) DEVICE — GLOVE BIOGEL PI MICRO INDICATOR UNDERGLOVE SZ 7.5 48975

## (undated) DEVICE — NDL 22GA 1.5"

## (undated) DEVICE — DRAPE TIBURON GENERAL ENDOSCOPY 9458

## (undated) DEVICE — DRAPE IOBAN LG .375X23.5" 6648EZ

## (undated) DEVICE — NDL 18GA 1.5" 305196

## (undated) DEVICE — SYR 50ML LL W/O NDL 309653

## (undated) DEVICE — SUCTION MANIFOLD NEPTUNE 2 SYS 4 PORT 0702-020-000

## (undated) DEVICE — DRAPE ARTHROSCOPY SHOULDER BEACHCHAIR 29369

## (undated) DEVICE — BLADE SAW SAGITTAL STRK 19.5X90X1.27MM 2108-109-000S11

## (undated) DEVICE — BONE CLEANING TIP INTERPULSE  0210-010-000

## (undated) DEVICE — GOWN XLG DISP 9545

## (undated) DEVICE — BONE CEMENT MIXEVAC III HI VAC KIT  0206-015-000

## (undated) DEVICE — BLADE CLIPPER 4406

## (undated) DEVICE — GLOVE PROTEXIS BLUE W/NEU-THERA 7.5  2D73EB75

## (undated) DEVICE — PREP DURAPREP 26ML APL 8630

## (undated) DEVICE — DAVINCI XI OBTURATOR BLADELESS 8MM 470359

## (undated) DEVICE — BNDG COBAN 6"X5YDS STERILE

## (undated) DEVICE — DECANTER VIAL 2006S

## (undated) DEVICE — DRAPE SHEET REV FOLD 3/4 9349

## (undated) DEVICE — CLIP ENDO HEMO-LOC GREEN MED/LG 544230

## (undated) DEVICE — SYSTEM LAPAROVUE VISIBILITY LAPVUE10

## (undated) DEVICE — DAVINCI XI SEAL UNIVERSAL 5-8MM 470361

## (undated) DEVICE — SOL WATER IRRIG 1000ML BOTTLE 2F7114

## (undated) DEVICE — SOL WATER IRRIG 1000ML BOTTLE 07139-09

## (undated) DEVICE — SU MONOCRYL 3-0 PS-2 18" UND Y497G

## (undated) DEVICE — SOL NACL 0.9% IRRIG 1000ML BOTTLE 07138-09

## (undated) DEVICE — GLOVE BIOGEL PI ULTRATOUCH G SZ 7.0 42170

## (undated) DEVICE — BONE CEMENT KIT BOWL AND SPATULA STRK 6201-3-410

## (undated) DEVICE — GLOVE PROTEXIS W/NEU-THERA 8.0  2D73TE80

## (undated) DEVICE — PREP CHLORAPREP 26ML TINTED ORANGE  260815

## (undated) DEVICE — KIT PATIENT POSITIONING PIGAZZI LATEX FREE 40580

## (undated) DEVICE — DAVINCI XI CLIP APPLIER MED HEM-O-LOK GREEN 470327

## (undated) DEVICE — DEVICE SUTURE GRASPER TROCAR CLOSURE 14GA PMITCSG

## (undated) DEVICE — SU STRATAFIX MONOCRYL 3-0 SPIRAL PS-2 30CM SXMP1B106

## (undated) DEVICE — SU NDL MAYO 1824-2

## (undated) DEVICE — ENDO TROCAR FIRST ENTRY KII FIOS Z-THRD 05X100MM CTF03

## (undated) DEVICE — SYR 70ML TOOMEY 041170

## (undated) DEVICE — SU STRATAFIX 3-0 MH 12" PS-2 SXMD1B103

## (undated) DEVICE — GLOVE PROTEXIS W/NEU-THERA 7.5  2D73TE75

## (undated) DEVICE — SOL NACL 0.9% IRRIG 3000ML BAG 07972-08

## (undated) DEVICE — SU DERMABOND ADVANCED .7ML DNX12

## (undated) DEVICE — SPONGE LAP 18X18" 1515

## (undated) DEVICE — ENDO POUCH UNIV RETRIEVAL SYSTEM INZII 10MM CD001

## (undated) DEVICE — GLOVE BIOGEL PI MICRO SZ 7.0 48570

## (undated) DEVICE — GLOVE BIOGEL PI MICRO INDICATOR UNDERGLOVE SZ 8.5 48985

## (undated) DEVICE — GLOVE PROTEXIS BLUE W/NEU-THERA 8.5  2D73EB85

## (undated) DEVICE — SU PDO 1 STRATAFIX 36X36CM CTX TAPERPOINT SXPD2B405

## (undated) DEVICE — BONE CEMENT PREP RESTRICTOR/BRUSH 121010

## (undated) DEVICE — SU VICRYL 2-0 CT-1 36" UND J945H

## (undated) DEVICE — DAVINCI XI DRAPE COLUMN 470341

## (undated) DEVICE — DAVINCI XI MONOPOLAR SCISSORS HOT SHEARS 8MM 470179

## (undated) DEVICE — SUCTION TIP FLEXI CLEAR TIP DISP K62

## (undated) DEVICE — SU DERMABOND PRINEO CLR602US

## (undated) DEVICE — DAVINCI XI ESU FORCE BIPOLAR 8MM 471405

## (undated) DEVICE — DRAPE U SPLIT 74X120" 29440

## (undated) DEVICE — DAVINCI XI REDUCER 8-12MM 470381

## (undated) DEVICE — SU VICRYL 0 TIE 54" UND J287G

## (undated) DEVICE — DAVINCI XI GRASPER PROGRASP 8MM EXT 471093

## (undated) DEVICE — KIT DRAIN CLOSED WOUND SUCTION MED 400ML RESVR

## (undated) DEVICE — DRAPE STERI U 1015

## (undated) DEVICE — Device

## (undated) DEVICE — ESU PENCIL SMOKE EVAC W/ROCKER SWITCH 0703-047-000

## (undated) DEVICE — SU ETHIBOND 1 CT-1 30" X425H

## (undated) DEVICE — SU PDO 3-0 STRATAFIX 24CM FS/FS REV CUT SXPD2B419

## (undated) DEVICE — DRSG AQUACEL AG 3.5X9.75" HYDROFIBER 412011

## (undated) DEVICE — DEVICE RETRIEVER HEWSON 71111579

## (undated) DEVICE — DAVINCI XI DRAPE ARM 470015

## (undated) DEVICE — DRAPE BACK TABLE  44X90" 8377

## (undated) DEVICE — TOURNIQUET SGL  BLADDER 30" DL PORT BLUE 5921-030-235

## (undated) DEVICE — BLADE SAW SAGITTAL STRK 18X90X1.27MM HD SYS 6 6118-127-090

## (undated) DEVICE — BLANKET BAIR HUGGER LOWER BODY 42568

## (undated) DEVICE — BLADE KNIFE SURG 10 371110

## (undated) DEVICE — SU FIBERWIRE 2 38" T-8 NDL  AR-7206

## (undated) DEVICE — TAPE MEDIPORE 4"X10YD 2964

## (undated) DEVICE — PACK SHOULDER

## (undated) DEVICE — DRSG STERI STRIP 1/2X4" R1547

## (undated) DEVICE — ENDO POUCH UNIVERSAL RETRIEVAL SYSTEM INZII 5MM CD003

## (undated) DEVICE — GLOVE BIOGEL PI MICRO SZ 8.5 48585

## (undated) DEVICE — CATH TRAY FOLEY 16FR SILICONE 907416

## (undated) RX ORDER — PHENYLEPHRINE HCL IN 0.9% NACL 1 MG/10 ML
SYRINGE (ML) INTRAVENOUS
Status: DISPENSED
Start: 2017-05-31

## (undated) RX ORDER — ROPIVACAINE HYDROCHLORIDE 7.5 MG/ML
INJECTION, SOLUTION EPIDURAL; PERINEURAL
Status: DISPENSED
Start: 2018-02-28

## (undated) RX ORDER — PROPOFOL 10 MG/ML
INJECTION, EMULSION INTRAVENOUS
Status: DISPENSED
Start: 2023-06-21

## (undated) RX ORDER — GLYCOPYRROLATE 0.2 MG/ML
INJECTION, SOLUTION INTRAMUSCULAR; INTRAVENOUS
Status: DISPENSED
Start: 2018-02-28

## (undated) RX ORDER — LIDOCAINE HYDROCHLORIDE 10 MG/ML
INJECTION, SOLUTION EPIDURAL; INFILTRATION; INTRACAUDAL; PERINEURAL
Status: DISPENSED
Start: 2018-02-28

## (undated) RX ORDER — ACETAMINOPHEN 325 MG/1
TABLET ORAL
Status: DISPENSED
Start: 2023-06-21

## (undated) RX ORDER — ONDANSETRON 2 MG/ML
INJECTION INTRAMUSCULAR; INTRAVENOUS
Status: DISPENSED
Start: 2018-02-28

## (undated) RX ORDER — LIDOCAINE HYDROCHLORIDE AND EPINEPHRINE 15; 5 MG/ML; UG/ML
INJECTION, SOLUTION EPIDURAL
Status: DISPENSED
Start: 2018-02-28

## (undated) RX ORDER — DEXAMETHASONE SODIUM PHOSPHATE 4 MG/ML
INJECTION, SOLUTION INTRA-ARTICULAR; INTRALESIONAL; INTRAMUSCULAR; INTRAVENOUS; SOFT TISSUE
Status: DISPENSED
Start: 2017-05-31

## (undated) RX ORDER — PROPOFOL 10 MG/ML
INJECTION, EMULSION INTRAVENOUS
Status: DISPENSED
Start: 2023-10-20

## (undated) RX ORDER — FENTANYL CITRATE-0.9 % NACL/PF 10 MCG/ML
PLASTIC BAG, INJECTION (ML) INTRAVENOUS
Status: DISPENSED
Start: 2023-10-20

## (undated) RX ORDER — FENTANYL CITRATE 50 UG/ML
INJECTION, SOLUTION INTRAMUSCULAR; INTRAVENOUS
Status: DISPENSED
Start: 2018-02-28

## (undated) RX ORDER — EPINEPHRINE 1 MG/ML
INJECTION, SOLUTION, CONCENTRATE INTRAVENOUS
Status: DISPENSED
Start: 2023-06-21

## (undated) RX ORDER — GLYCOPYRROLATE 0.2 MG/ML
INJECTION, SOLUTION INTRAMUSCULAR; INTRAVENOUS
Status: DISPENSED
Start: 2023-06-21

## (undated) RX ORDER — DIPHENHYDRAMINE HYDROCHLORIDE 50 MG/ML
INJECTION INTRAMUSCULAR; INTRAVENOUS
Status: DISPENSED
Start: 2023-06-21

## (undated) RX ORDER — PROPOFOL 10 MG/ML
INJECTION, EMULSION INTRAVENOUS
Status: DISPENSED
Start: 2017-05-31

## (undated) RX ORDER — ONDANSETRON 2 MG/ML
INJECTION INTRAMUSCULAR; INTRAVENOUS
Status: DISPENSED
Start: 2023-06-21

## (undated) RX ORDER — DEXAMETHASONE SODIUM PHOSPHATE 4 MG/ML
INJECTION, SOLUTION INTRA-ARTICULAR; INTRALESIONAL; INTRAMUSCULAR; INTRAVENOUS; SOFT TISSUE
Status: DISPENSED
Start: 2023-06-21

## (undated) RX ORDER — LIDOCAINE HYDROCHLORIDE 10 MG/ML
INJECTION, SOLUTION EPIDURAL; INFILTRATION; INTRACAUDAL; PERINEURAL
Status: DISPENSED
Start: 2017-05-31

## (undated) RX ORDER — TRANEXAMIC ACID 650 MG/1
TABLET ORAL
Status: DISPENSED
Start: 2023-06-21

## (undated) RX ORDER — BUPIVACAINE HYDROCHLORIDE AND EPINEPHRINE 5; 5 MG/ML; UG/ML
INJECTION, SOLUTION EPIDURAL; INTRACAUDAL; PERINEURAL
Status: DISPENSED
Start: 2023-10-20

## (undated) RX ORDER — FENTANYL CITRATE 50 UG/ML
INJECTION, SOLUTION INTRAMUSCULAR; INTRAVENOUS
Status: DISPENSED
Start: 2023-06-21

## (undated) RX ORDER — DEXAMETHASONE SODIUM PHOSPHATE 4 MG/ML
INJECTION, SOLUTION INTRA-ARTICULAR; INTRALESIONAL; INTRAMUSCULAR; INTRAVENOUS; SOFT TISSUE
Status: DISPENSED
Start: 2023-10-20

## (undated) RX ORDER — HEPARIN SODIUM 5000 [USP'U]/.5ML
INJECTION, SOLUTION INTRAVENOUS; SUBCUTANEOUS
Status: DISPENSED
Start: 2023-10-20

## (undated) RX ORDER — ONDANSETRON 2 MG/ML
INJECTION INTRAMUSCULAR; INTRAVENOUS
Status: DISPENSED
Start: 2023-10-20

## (undated) RX ORDER — PROPOFOL 10 MG/ML
INJECTION, EMULSION INTRAVENOUS
Status: DISPENSED
Start: 2018-02-28

## (undated) RX ORDER — ACETAMINOPHEN 325 MG/1
TABLET ORAL
Status: DISPENSED
Start: 2018-02-28

## (undated) RX ORDER — GLYCOPYRROLATE 0.2 MG/ML
INJECTION, SOLUTION INTRAMUSCULAR; INTRAVENOUS
Status: DISPENSED
Start: 2023-10-20

## (undated) RX ORDER — ONDANSETRON 2 MG/ML
INJECTION INTRAMUSCULAR; INTRAVENOUS
Status: DISPENSED
Start: 2017-05-31

## (undated) RX ORDER — FENTANYL CITRATE 50 UG/ML
INJECTION, SOLUTION INTRAMUSCULAR; INTRAVENOUS
Status: DISPENSED
Start: 2023-10-20

## (undated) RX ORDER — LIDOCAINE HYDROCHLORIDE AND EPINEPHRINE 10; 10 MG/ML; UG/ML
INJECTION, SOLUTION INFILTRATION; PERINEURAL
Status: DISPENSED
Start: 2018-02-28

## (undated) RX ORDER — MAGNESIUM SULFATE HEPTAHYDRATE 40 MG/ML
INJECTION, SOLUTION INTRAVENOUS
Status: DISPENSED
Start: 2023-06-21

## (undated) RX ORDER — FENTANYL CITRATE 50 UG/ML
INJECTION, SOLUTION INTRAMUSCULAR; INTRAVENOUS
Status: DISPENSED
Start: 2017-05-31

## (undated) RX ORDER — FENTANYL CITRATE-0.9 % NACL/PF 10 MCG/ML
PLASTIC BAG, INJECTION (ML) INTRAVENOUS
Status: DISPENSED
Start: 2023-06-21

## (undated) RX ORDER — LIDOCAINE HYDROCHLORIDE 20 MG/ML
JELLY TOPICAL
Status: DISPENSED
Start: 2023-06-21

## (undated) RX ORDER — LIDOCAINE HYDROCHLORIDE 10 MG/ML
INJECTION, SOLUTION EPIDURAL; INFILTRATION; INTRACAUDAL; PERINEURAL
Status: DISPENSED
Start: 2023-06-21

## (undated) RX ORDER — ALBUTEROL SULFATE 0.83 MG/ML
SOLUTION RESPIRATORY (INHALATION)
Status: DISPENSED
Start: 2023-10-20

## (undated) RX ORDER — GABAPENTIN 100 MG/1
CAPSULE ORAL
Status: DISPENSED
Start: 2023-10-20

## (undated) RX ORDER — ACETAMINOPHEN 325 MG/1
TABLET ORAL
Status: DISPENSED
Start: 2023-10-20

## (undated) RX ORDER — PHENYLEPHRINE HCL IN 0.9% NACL 1 MG/10 ML
SYRINGE (ML) INTRAVENOUS
Status: DISPENSED
Start: 2018-02-28

## (undated) RX ORDER — CEFAZOLIN SODIUM/WATER 3 G/30 ML
SYRINGE (ML) INTRAVENOUS
Status: DISPENSED
Start: 2023-06-21

## (undated) RX ORDER — CELECOXIB 200 MG/1
CAPSULE ORAL
Status: DISPENSED
Start: 2018-02-28

## (undated) RX ORDER — KETOROLAC TROMETHAMINE 30 MG/ML
INJECTION, SOLUTION INTRAMUSCULAR; INTRAVENOUS
Status: DISPENSED
Start: 2023-10-20

## (undated) RX ORDER — EPHEDRINE SULFATE 50 MG/ML
INJECTION, SOLUTION INTRAVENOUS
Status: DISPENSED
Start: 2018-02-28